# Patient Record
Sex: FEMALE | Race: WHITE | NOT HISPANIC OR LATINO | Employment: FULL TIME | URBAN - METROPOLITAN AREA
[De-identification: names, ages, dates, MRNs, and addresses within clinical notes are randomized per-mention and may not be internally consistent; named-entity substitution may affect disease eponyms.]

---

## 2018-09-11 ENCOUNTER — HOSPITAL ENCOUNTER (EMERGENCY)
Facility: HOSPITAL | Age: 38
Discharge: HOME | End: 2018-09-11
Attending: EMERGENCY MEDICINE
Payer: COMMERCIAL

## 2018-09-11 ENCOUNTER — APPOINTMENT (EMERGENCY)
Dept: CT IMAGING | Facility: HOSPITAL | Age: 38
End: 2018-09-11
Payer: COMMERCIAL

## 2018-09-11 VITALS
BODY MASS INDEX: 29.35 KG/M2 | SYSTOLIC BLOOD PRESSURE: 134 MMHG | OXYGEN SATURATION: 100 % | WEIGHT: 176.37 LBS | DIASTOLIC BLOOD PRESSURE: 83 MMHG | RESPIRATION RATE: 18 BRPM | TEMPERATURE: 98.1 F | HEART RATE: 83 BPM

## 2018-09-11 DIAGNOSIS — R51.9 HEADACHE: Primary | ICD-10-CM

## 2018-09-11 LAB
ALBUMIN SERPL BCP-MCNC: 4 G/DL (ref 3.5–5)
ALP SERPL-CCNC: 82 U/L (ref 46–116)
ALT SERPL W P-5'-P-CCNC: 34 U/L (ref 12–78)
ANION GAP SERPL CALCULATED.3IONS-SCNC: 12 MMOL/L (ref 4–13)
AST SERPL W P-5'-P-CCNC: 15 U/L (ref 5–45)
BASOPHILS # BLD AUTO: 0.05 THOUSANDS/ΜL (ref 0–0.1)
BASOPHILS NFR BLD AUTO: 1 % (ref 0–1)
BILIRUB SERPL-MCNC: 0.8 MG/DL (ref 0.2–1)
BUN SERPL-MCNC: 7 MG/DL (ref 5–25)
CALCIUM SERPL-MCNC: 8.7 MG/DL (ref 8.3–10.1)
CHLORIDE SERPL-SCNC: 104 MMOL/L (ref 100–108)
CO2 SERPL-SCNC: 27 MMOL/L (ref 21–32)
CREAT SERPL-MCNC: 0.79 MG/DL (ref 0.6–1.3)
EOSINOPHIL # BLD AUTO: 0.21 THOUSAND/ΜL (ref 0–0.61)
EOSINOPHIL NFR BLD AUTO: 3 % (ref 0–6)
ERYTHROCYTE [DISTWIDTH] IN BLOOD BY AUTOMATED COUNT: 11.5 % (ref 11.6–15.1)
GFR SERPL CREATININE-BSD FRML MDRD: 95 ML/MIN/1.73SQ M
GLUCOSE SERPL-MCNC: 90 MG/DL (ref 65–140)
HCT VFR BLD AUTO: 41 % (ref 34.8–46.1)
HGB BLD-MCNC: 14.2 G/DL (ref 11.5–15.4)
IMM GRANULOCYTES # BLD AUTO: 0.01 THOUSAND/UL (ref 0–0.2)
IMM GRANULOCYTES NFR BLD AUTO: 0 % (ref 0–2)
LYMPHOCYTES # BLD AUTO: 2.26 THOUSANDS/ΜL (ref 0.6–4.47)
LYMPHOCYTES NFR BLD AUTO: 31 % (ref 14–44)
MCH RBC QN AUTO: 32.3 PG (ref 26.8–34.3)
MCHC RBC AUTO-ENTMCNC: 34.6 G/DL (ref 31.4–37.4)
MCV RBC AUTO: 93 FL (ref 82–98)
MONOCYTES # BLD AUTO: 0.54 THOUSAND/ΜL (ref 0.17–1.22)
MONOCYTES NFR BLD AUTO: 7 % (ref 4–12)
NEUTROPHILS # BLD AUTO: 4.19 THOUSANDS/ΜL (ref 1.85–7.62)
NEUTS SEG NFR BLD AUTO: 58 % (ref 43–75)
NRBC BLD AUTO-RTO: 0 /100 WBCS
PLATELET # BLD AUTO: 318 THOUSANDS/UL (ref 149–390)
PMV BLD AUTO: 9.4 FL (ref 8.9–12.7)
POTASSIUM SERPL-SCNC: 3.6 MMOL/L (ref 3.5–5.3)
PROT SERPL-MCNC: 7.7 G/DL (ref 6.4–8.2)
RBC # BLD AUTO: 4.39 MILLION/UL (ref 3.81–5.12)
SODIUM SERPL-SCNC: 143 MMOL/L (ref 136–145)
WBC # BLD AUTO: 7.26 THOUSAND/UL (ref 4.31–10.16)

## 2018-09-11 PROCEDURE — 96365 THER/PROPH/DIAG IV INF INIT: CPT

## 2018-09-11 PROCEDURE — 85025 COMPLETE CBC W/AUTO DIFF WBC: CPT | Performed by: EMERGENCY MEDICINE

## 2018-09-11 PROCEDURE — 36415 COLL VENOUS BLD VENIPUNCTURE: CPT | Performed by: EMERGENCY MEDICINE

## 2018-09-11 PROCEDURE — 99284 EMERGENCY DEPT VISIT MOD MDM: CPT

## 2018-09-11 PROCEDURE — 96361 HYDRATE IV INFUSION ADD-ON: CPT

## 2018-09-11 PROCEDURE — 70450 CT HEAD/BRAIN W/O DYE: CPT

## 2018-09-11 PROCEDURE — 96375 TX/PRO/DX INJ NEW DRUG ADDON: CPT

## 2018-09-11 PROCEDURE — 80053 COMPREHEN METABOLIC PANEL: CPT | Performed by: EMERGENCY MEDICINE

## 2018-09-11 RX ORDER — BUTALBITAL, ACETAMINOPHEN AND CAFFEINE 50; 325; 40 MG/1; MG/1; MG/1
1 TABLET ORAL EVERY 4 HOURS PRN
Qty: 20 TABLET | Refills: 0 | Status: SHIPPED | OUTPATIENT
Start: 2018-09-11

## 2018-09-11 RX ORDER — METOCLOPRAMIDE 10 MG/1
10 TABLET ORAL EVERY 6 HOURS PRN
Qty: 30 TABLET | Refills: 0 | Status: SHIPPED | OUTPATIENT
Start: 2018-09-11 | End: 2020-05-18

## 2018-09-11 RX ORDER — METOCLOPRAMIDE HYDROCHLORIDE 5 MG/ML
10 INJECTION INTRAMUSCULAR; INTRAVENOUS ONCE
Status: COMPLETED | OUTPATIENT
Start: 2018-09-11 | End: 2018-09-11

## 2018-09-11 RX ORDER — LORAZEPAM 2 MG/ML
0.5 INJECTION INTRAMUSCULAR ONCE
Status: COMPLETED | OUTPATIENT
Start: 2018-09-11 | End: 2018-09-11

## 2018-09-11 RX ORDER — KETOROLAC TROMETHAMINE 30 MG/ML
30 INJECTION, SOLUTION INTRAMUSCULAR; INTRAVENOUS ONCE
Status: COMPLETED | OUTPATIENT
Start: 2018-09-11 | End: 2018-09-11

## 2018-09-11 RX ORDER — MAGNESIUM SULFATE HEPTAHYDRATE 40 MG/ML
2 INJECTION, SOLUTION INTRAVENOUS ONCE
Status: COMPLETED | OUTPATIENT
Start: 2018-09-11 | End: 2018-09-11

## 2018-09-11 RX ORDER — DIPHENHYDRAMINE HYDROCHLORIDE 50 MG/ML
25 INJECTION INTRAMUSCULAR; INTRAVENOUS ONCE
Status: COMPLETED | OUTPATIENT
Start: 2018-09-11 | End: 2018-09-11

## 2018-09-11 RX ADMIN — KETOROLAC TROMETHAMINE 30 MG: 30 INJECTION, SOLUTION INTRAMUSCULAR at 18:36

## 2018-09-11 RX ADMIN — DIPHENHYDRAMINE HYDROCHLORIDE 25 MG: 50 INJECTION, SOLUTION INTRAMUSCULAR; INTRAVENOUS at 18:36

## 2018-09-11 RX ADMIN — HYDROMORPHONE HYDROCHLORIDE 0.5 MG: 1 INJECTION, SOLUTION INTRAMUSCULAR; INTRAVENOUS; SUBCUTANEOUS at 19:45

## 2018-09-11 RX ADMIN — LORAZEPAM 0.5 MG: 2 INJECTION INTRAMUSCULAR; INTRAVENOUS at 19:45

## 2018-09-11 RX ADMIN — MAGNESIUM SULFATE HEPTAHYDRATE 2 G: 40 INJECTION, SOLUTION INTRAVENOUS at 18:37

## 2018-09-11 RX ADMIN — METOCLOPRAMIDE 10 MG: 5 INJECTION, SOLUTION INTRAMUSCULAR; INTRAVENOUS at 18:36

## 2018-09-11 RX ADMIN — SODIUM CHLORIDE 1000 ML: 0.9 INJECTION, SOLUTION INTRAVENOUS at 18:37

## 2018-09-11 NOTE — ED PROVIDER NOTES
History  Chief Complaint   Patient presents with   [address]     Patient reports having "bad headache for 6 weeks " Woke up today with blurry vision out of left eye and states, "it feels like my head is going to explode "  Has appt with neurology 528      66-year-old female comes in for evaluation headache  Patient reports having headache starting approximately 6 weeks ago saw her PCP who put her on anti-inflammatories and muscle relaxants  It is patient states that that did not help and she has been trying to use Excedrin migraine to manage the headache  Patient states the pain became much worse today and now has some double vision in her left eye  Patient denies any previous history of migraines  Patient denies any fever or neck pain  History provided by:  Patient   used: No    Headache   Pain location:  L temporal  Quality:  Stabbing  Radiates to:  Eyes  Severity currently:  10/10  Severity at highest:  10/10  Onset quality:  Gradual  Duration:  6 weeks  Timing:  Constant  Progression:  Worsening  Chronicity:  New  Similar to prior headaches: no    Context: bright light    Ineffective treatments:  Acetaminophen, prescription medications and resting in a darkened room  Associated symptoms: blurred vision and visual change    Associated symptoms: no abdominal pain, no back pain, no congestion, no cough, no diarrhea, no ear pain, no fatigue, no fever, no neck stiffness and no numbness    Visual Change:     Location:  L eye    Quality: decreased vision and double vision      Onset quality:  Sudden    Severity:  Moderate    Duration:  8 hours    Timing:  Constant    Progression:  Worsening    Chronicity:  New  Risk factors: no anger and lifestyle not sedentary        Prior to Admission Medications   Prescriptions Last Dose Informant Patient Reported?  Taking?   oxyCODONE-acetaminophen (PERCOCET) 5-325 mg per tablet   No No   Sig: Take 1 tablet by mouth every 4 (four) hours as needed for moderate pain for up to 15 doses  Max Daily Amount: 6 tablets      Facility-Administered Medications: None       Past Medical History:   Diagnosis Date    Migraine     Ovarian cyst     left       Past Surgical History:   Procedure Laterality Date    HYSTERECTOMY      KNEE SURGERY         History reviewed  No pertinent family history  I have reviewed and agree with the history as documented  Social History   Substance Use Topics    Smoking status: Current Every Day Smoker     Packs/day: 1 00    Smokeless tobacco: Not on file    Alcohol use Yes      Comment: social        Review of Systems   Constitutional: Negative for fatigue and fever  HENT: Negative for congestion and ear pain  Eyes: Positive for blurred vision  Negative for discharge and redness  Respiratory: Negative for apnea, cough, shortness of breath and wheezing  Cardiovascular: Negative for chest pain  Gastrointestinal: Negative for abdominal pain and diarrhea  Endocrine: Negative for cold intolerance and polydipsia  Genitourinary: Negative for difficulty urinating and hematuria  Musculoskeletal: Negative for arthralgias, back pain and neck stiffness  Skin: Negative for color change and rash  Allergic/Immunologic: Negative for environmental allergies and immunocompromised state  Neurological: Positive for headaches  Negative for numbness  Hematological: Negative for adenopathy  Does not bruise/bleed easily  Psychiatric/Behavioral: Negative for agitation and behavioral problems  Physical Exam  Physical Exam   Constitutional: She is oriented to person, place, and time  Vital signs are normal  She appears well-developed and well-nourished  Non-toxic appearance  She appears distressed  HENT:   Head: Normocephalic and atraumatic     Right Ear: Tympanic membrane and external ear normal    Left Ear: Tympanic membrane and external ear normal    Nose: Nose normal  No rhinorrhea, sinus tenderness or nasal deformity  Mouth/Throat: Uvula is midline and oropharynx is clear and moist  Normal dentition  Eyes: Conjunctivae, EOM and lids are normal  Pupils are equal, round, and reactive to light  Right eye exhibits no discharge  Left eye exhibits no discharge  Neck: Trachea normal and normal range of motion  Neck supple  No JVD present  Carotid bruit is not present  Cardiovascular: Normal rate, regular rhythm, intact distal pulses and normal pulses  No extrasystoles are present  PMI is not displaced  Pulmonary/Chest: Effort normal and breath sounds normal  No accessory muscle usage  No respiratory distress  She has no wheezes  She has no rhonchi  She has no rales  Abdominal: Soft  Normal appearance and bowel sounds are normal  She exhibits no mass  There is no tenderness  There is no rigidity, no rebound and no guarding  Musculoskeletal:        Right shoulder: She exhibits normal range of motion, no bony tenderness, no swelling and no deformity  Cervical back: Normal  She exhibits normal range of motion, no tenderness, no bony tenderness and no deformity  Lymphadenopathy:     She has no cervical adenopathy  She has no axillary adenopathy  Neurological: She is alert and oriented to person, place, and time  She has normal strength and normal reflexes  No cranial nerve deficit or sensory deficit  GCS eye subscore is 4  GCS verbal subscore is 5  GCS motor subscore is 6  Skin: Skin is warm and dry  No rash noted  Psychiatric: She has a normal mood and affect  Her speech is normal and behavior is normal    Nursing note and vitals reviewed        Vital Signs  ED Triage Vitals   Temperature Pulse Respirations Blood Pressure SpO2   09/11/18 1806 09/11/18 1804 09/11/18 1804 09/11/18 1804 09/11/18 1804   98 1 °F (36 7 °C) 83 18 134/83 100 %      Temp Source Heart Rate Source Patient Position - Orthostatic VS BP Location FiO2 (%)   09/11/18 1806 09/11/18 1804 09/11/18 1804 09/11/18 1804 --   Oral Monitor Lying Right arm       Pain Score       09/11/18 1804       Worst Possible Pain           Vitals:    09/11/18 1804   BP: 134/83   Pulse: 83   Patient Position - Orthostatic VS: Lying       Visual Acuity      ED Medications  Medications   sodium chloride 0 9 % bolus 1,000 mL (1,000 mL Intravenous New Bag 9/11/18 1837)   diphenhydrAMINE (BENADRYL) injection 25 mg (25 mg Intravenous Given 9/11/18 1836)   metoclopramide (REGLAN) injection 10 mg (10 mg Intravenous Given 9/11/18 1836)   ketorolac (TORADOL) injection 30 mg (30 mg Intravenous Given 9/11/18 1836)   magnesium sulfate 2 g/50 mL IVPB (premix) 2 g (0 g Intravenous Stopped 9/11/18 1937)   HYDROmorphone (DILAUDID) injection 0 5 mg (0 5 mg Intravenous Given 9/11/18 1945)   LORazepam (ATIVAN) 2 mg/mL injection 0 5 mg (0 5 mg Intravenous Given 9/11/18 1945)       Diagnostic Studies  Results Reviewed     Procedure Component Value Units Date/Time    Comprehensive metabolic panel [[id number]] Collected:  09/11/18 1837    Lab Status:  Final result Specimen:  Blood from Arm, Right Updated:  09/11/18 1920     Sodium 143 mmol/L      Potassium 3 6 mmol/L      Chloride 104 mmol/L      CO2 27 mmol/L      ANION GAP 12 mmol/L      BUN 7 mg/dL      Creatinine 0 79 mg/dL      Glucose 90 mg/dL      Calcium 8 7 mg/dL      AST 15 U/L      ALT 34 U/L      Alkaline Phosphatase 82 U/L      Total Protein 7 7 g/dL      Albumin 4 0 g/dL      Total Bilirubin 0 80 mg/dL      eGFR 95 ml/min/1 73sq m     Narrative:         National Kidney Disease Education Program recommendations are as follows:  GFR calculation is accurate only with a steady state creatinine  Chronic Kidney disease less than 60 ml/min/1 73 sq  meters  Kidney failure less than 15 ml/min/1 73 sq  meters      CBC and differential [[id number]]  (Abnormal) Collected:  09/11/18 1837    Lab Status:  Final result Specimen:  Blood from Arm, Right Updated:  09/11/18 1846     WBC 7 26 Thousand/uL      RBC 4 39 Million/uL Hemoglobin 14 2 g/dL      Hematocrit 41 0 %      MCV 93 fL      MCH 32 3 pg      MCHC 34 6 g/dL      RDW 11 5 (L) %      MPV 9 4 fL      Platelets 682 Thousands/uL      nRBC 0 /100 WBCs      Neutrophils Relative 58 %      Immat GRANS % 0 %      Lymphocytes Relative 31 %      Monocytes Relative 7 %      Eosinophils Relative 3 %      Basophils Relative 1 %      Neutrophils Absolute 4 19 Thousands/µL      Immature Grans Absolute 0 01 Thousand/uL      Lymphocytes Absolute 2 26 Thousands/µL      Monocytes Absolute 0 54 Thousand/µL      Eosinophils Absolute 0 21 Thousand/µL      Basophils Absolute 0 05 Thousands/µL                  CT head wo contrast   Final Result by Naman Bowman MD (09/11 1927)      No acute intracranial abnormality  Workstation performed: [id number]                    Procedures  Procedures       Phone Contacts  ED Phone Contact    ED Course                               MDM  Number of Diagnoses or Management Options  Headache: new and requires workup     Amount and/or Complexity of Data Reviewed  Clinical lab tests: ordered and reviewed  Tests in the radiology section of CPT®: ordered and reviewed  Tests in the medicine section of CPT®: ordered and reviewed    Patient Progress  Patient progress: stable    CritCare Time    Disposition  Final diagnoses:   Headache     Time reflects when diagnosis was documented in both MDM as applicable and the Disposition within this note     Time User Action Codes Description Comment    9/11/2018  7:59 PM Theodora MALHOTRA Add [R51] Headache       ED Disposition     ED Disposition Condition Comment    Discharge  [facility] 60 discharge to home/self care      Condition at discharge: Good        Follow-up Information     Follow up With Specialties Details Why 12 Silver Cochran MD Internal Medicine Schedule an appointment as soon as possible for a visit  [address] [place] [address]  [phone]            Patient's Medications   Discharge Prescriptions    BUTALBITAL-ACETAMINOPHEN-CAFFEINE (FIORICET,ESGIC) -40 MG PER TABLET    Take 1 tablet by mouth every 4 (four) hours as needed for headaches       Start Date: 9/11/2018 End Date: --       Order Dose: 1 tablet       Quantity: 20 tablet    Refills: 0    METOCLOPRAMIDE (REGLAN) 10 MG TABLET    Take 1 tablet (10 mg total) by mouth every 6 (six) hours as needed (Nausea vomiting)       Start Date: 9/11/2018 End Date: --       Order Dose: 10 mg       Quantity: 30 tablet    Refills: 0     No discharge procedures on file      ED Provider  Electronically Signed by           Carline Ashley DO  09/11/18 2003

## 2018-09-12 NOTE — ED NOTES
Discharge instruction given to patient  Medication and prescription reviewed  No questions or concerns at this time  Patient able to ambulate out without difficulty        Zoltan Soriano RN  09/11/18 2050

## 2018-09-12 NOTE — DISCHARGE INSTRUCTIONS
Acute Headache, Ambulatory Care   GENERAL INFORMATION:   An acute headache  is pain or discomfort that starts suddenly and gets worse quickly  The cause of an acute headache may not be known  It may be triggered by stress, fatigue, hormones, food, or trauma  Common related symptoms include the following:   · Fever    · Sinus pressure    · Loss of memory    · Nausea or vomiting    · Problems with your vision, such as watery or red eyes, loss of vision, or pain in bright light    · Stiff neck    · Tenderness of the head and neck area    · Trouble staying awake, or being less alert than usual     · Weakness or less energy  Seek immediate care for the following symptoms:   · Severe pain    · A headache that occurs after a blow to the head, a fall, or other trauma     · Confusion or forgetfulness    · Numbness on one side of your face or body  Treatment for an acute headache  may include medicine to decrease pain  You may also need biofeedback or cognitive behavioral therapy  Ask your healthcare provider about these and other treatments for an acute headache  Manage my symptoms:   · Apply heat  on your head for 20 to 30 minutes every 2 hours for as many days as directed  Heat helps decrease pain and muscle spasms  You may alternate heat and ice  · Apply ice  on your head for 15 to 20 minutes every hour or as directed  Use an ice pack, or put crushed ice in a plastic bag  Cover it with a towel  Ice helps decrease pain  · Relax your muscles  Lie down in a comfortable position and close your eyes  Relax your muscles slowly  Start at your toes and work your way up your body  · Keep a record of your headaches  Write down when your headaches start and stop  Include your symptoms and what you were doing when the headache began  Record what you ate or drank for 24 hours before the headache started  Describe the pain and where it hurts  Keep track of what you did to treat your headache and whether it worked    Follow up with your healthcare provider as directed:  Bring your headache record with you when you see your healthcare provider  Write down your questions so you remember to ask them during your visits  CARE AGREEMENT:   You have the right to help plan your care  Learn about your health condition and how it may be treated  Discuss treatment options with your caregivers to decide what care you want to receive  You always have the right to refuse treatment  The above information is an  only  It is not intended as medical advice for individual conditions or treatments  Talk to your doctor, nurse or pharmacist before following any medical regimen to see if it is safe and effective for you  © 2014 [address] is for End User's use only and may not be sold, redistributed or otherwise used for commercial purposes  All illustrations and images included in CareNotes® are the copyrighted property of A D A M , Inc  or Drake Souza

## 2018-09-14 ENCOUNTER — HOSPITAL ENCOUNTER (INPATIENT)
Facility: HOSPITAL | Age: 38
LOS: 2 days | Discharge: HOME | DRG: 103 | End: 2018-09-19
Attending: EMERGENCY MEDICINE | Admitting: INTERNAL MEDICINE
Payer: COMMERCIAL

## 2018-09-14 DIAGNOSIS — R51.9 INTRACTABLE HEADACHE, UNSPECIFIED CHRONICITY PATTERN, UNSPECIFIED HEADACHE TYPE: Primary | ICD-10-CM

## 2018-09-14 PROCEDURE — 96375 TX/PRO/DX INJ NEW DRUG ADDON: CPT

## 2018-09-14 PROCEDURE — 99219 PR INITIAL OBSERVATION CARE/DAY 50 MINUTES: CPT | Performed by: PHYSICIAN ASSISTANT

## 2018-09-14 PROCEDURE — 99284 EMERGENCY DEPT VISIT MOD MDM: CPT

## 2018-09-14 PROCEDURE — 96372 THER/PROPH/DIAG INJ SC/IM: CPT

## 2018-09-14 PROCEDURE — 96365 THER/PROPH/DIAG IV INF INIT: CPT

## 2018-09-14 RX ORDER — ALPRAZOLAM 0.25 MG/1
1 TABLET ORAL DAILY PRN
Status: ON HOLD | COMMUNITY
End: 2018-09-19

## 2018-09-14 RX ORDER — NICOTINE 21 MG/24HR
1 PATCH, TRANSDERMAL 24 HOURS TRANSDERMAL DAILY
Status: DISCONTINUED | OUTPATIENT
Start: 2018-09-15 | End: 2018-09-19 | Stop reason: HOSPADM

## 2018-09-14 RX ORDER — DICYCLOMINE HYDROCHLORIDE 10 MG/1
20 CAPSULE ORAL
Status: DISCONTINUED | OUTPATIENT
Start: 2018-09-14 | End: 2018-09-19 | Stop reason: HOSPADM

## 2018-09-14 RX ORDER — ALPRAZOLAM 0.25 MG/1
0.25 TABLET ORAL DAILY PRN
Status: DISCONTINUED | OUTPATIENT
Start: 2018-09-14 | End: 2018-09-19 | Stop reason: HOSPADM

## 2018-09-14 RX ORDER — MAGNESIUM SULFATE HEPTAHYDRATE 40 MG/ML
2 INJECTION, SOLUTION INTRAVENOUS ONCE
Status: COMPLETED | OUTPATIENT
Start: 2018-09-14 | End: 2018-09-14

## 2018-09-14 RX ORDER — HALOPERIDOL 5 MG/ML
5 INJECTION INTRAMUSCULAR ONCE
Status: COMPLETED | OUTPATIENT
Start: 2018-09-14 | End: 2018-09-14

## 2018-09-14 RX ORDER — DIPHENHYDRAMINE HYDROCHLORIDE 50 MG/ML
25 INJECTION INTRAMUSCULAR; INTRAVENOUS ONCE
Status: COMPLETED | OUTPATIENT
Start: 2018-09-14 | End: 2018-09-14

## 2018-09-14 RX ORDER — KETOROLAC TROMETHAMINE 30 MG/ML
10 INJECTION, SOLUTION INTRAMUSCULAR; INTRAVENOUS ONCE
Status: COMPLETED | OUTPATIENT
Start: 2018-09-14 | End: 2018-09-14

## 2018-09-14 RX ORDER — METOCLOPRAMIDE HYDROCHLORIDE 5 MG/ML
5 INJECTION INTRAMUSCULAR; INTRAVENOUS ONCE
Status: COMPLETED | OUTPATIENT
Start: 2018-09-14 | End: 2018-09-14

## 2018-09-14 RX ORDER — DIPHENHYDRAMINE HYDROCHLORIDE 50 MG/ML
25 INJECTION INTRAMUSCULAR; INTRAVENOUS EVERY 8 HOURS PRN
Status: DISPENSED | OUTPATIENT
Start: 2018-09-14 | End: 2018-09-17

## 2018-09-14 RX ORDER — CYCLOBENZAPRINE HCL 10 MG
10 TABLET ORAL EVERY MORNING
Status: DISCONTINUED | OUTPATIENT
Start: 2018-09-15 | End: 2018-09-15

## 2018-09-14 RX ORDER — KETOROLAC TROMETHAMINE 30 MG/ML
30 INJECTION, SOLUTION INTRAMUSCULAR; INTRAVENOUS EVERY 12 HOURS
Status: COMPLETED | OUTPATIENT
Start: 2018-09-15 | End: 2018-09-17

## 2018-09-14 RX ORDER — DICYCLOMINE HCL 20 MG
1 TABLET ORAL
COMMUNITY
End: 2020-06-02 | Stop reason: SDUPTHER

## 2018-09-14 RX ORDER — MAGNESIUM SULFATE HEPTAHYDRATE 40 MG/ML
2 INJECTION, SOLUTION INTRAVENOUS
Status: DISCONTINUED | OUTPATIENT
Start: 2018-09-15 | End: 2018-09-15

## 2018-09-14 RX ORDER — METOCLOPRAMIDE HYDROCHLORIDE 5 MG/ML
10 INJECTION INTRAMUSCULAR; INTRAVENOUS EVERY 8 HOURS SCHEDULED
Status: DISCONTINUED | OUTPATIENT
Start: 2018-09-14 | End: 2018-09-16

## 2018-09-14 RX ORDER — BUPIVACAINE HYDROCHLORIDE 5 MG/ML
30 INJECTION, SOLUTION EPIDURAL; INTRACAUDAL ONCE
Status: COMPLETED | OUTPATIENT
Start: 2018-09-14 | End: 2018-09-14

## 2018-09-14 RX ORDER — MORPHINE SULFATE 10 MG/ML
6 INJECTION, SOLUTION INTRAMUSCULAR; INTRAVENOUS ONCE
Status: DISCONTINUED | OUTPATIENT
Start: 2018-09-14 | End: 2018-09-14

## 2018-09-14 RX ADMIN — HALOPERIDOL LACTATE 5 MG: 5 INJECTION, SOLUTION INTRAMUSCULAR at 18:46

## 2018-09-14 RX ADMIN — BUPIVACAINE HYDROCHLORIDE 30 ML: 5 INJECTION, SOLUTION EPIDURAL; INTRACAUDAL at 20:42

## 2018-09-14 RX ADMIN — MAGNESIUM SULFATE HEPTAHYDRATE 2 G: 40 INJECTION, SOLUTION INTRAVENOUS at 19:52

## 2018-09-14 RX ADMIN — KETOROLAC TROMETHAMINE 9.9 MG: 30 INJECTION, SOLUTION INTRAMUSCULAR at 19:51

## 2018-09-14 RX ADMIN — SODIUM CHLORIDE 500 ML: 0.9 INJECTION, SOLUTION INTRAVENOUS at 19:52

## 2018-09-14 RX ADMIN — DIPHENHYDRAMINE HYDROCHLORIDE 25 MG: 50 INJECTION, SOLUTION INTRAMUSCULAR; INTRAVENOUS at 18:47

## 2018-09-14 RX ADMIN — METOCLOPRAMIDE 5 MG: 5 INJECTION, SOLUTION INTRAMUSCULAR; INTRAVENOUS at 19:51

## 2018-09-14 NOTE — LETTER
[facility]  FLOOR MED SURG UNIT  [address] [facility] [address]  Dept: [phone]    September 19, 2018     Patient: Antonio Blood   YOB: 1980   Date of Visit: 9/14/2018       To Whom it May Concern:    Chavo Love is under my professional care  She was seen in the hospital from 9/14/2018   to 09/19/18  She may return to work on 9/24/18  If you have any questions or concerns, please don't hesitate to call           Sincerely,          Elaine Green PA-C

## 2018-09-15 PROCEDURE — 99225 PR SBSQ OBSERVATION CARE/DAY 25 MINUTES: CPT | Performed by: INTERNAL MEDICINE

## 2018-09-15 PROCEDURE — 99245 OFF/OP CONSLTJ NEW/EST HI 55: CPT | Performed by: PSYCHIATRY & NEUROLOGY

## 2018-09-15 RX ORDER — BACLOFEN 10 MG/1
10 TABLET ORAL 3 TIMES DAILY
Status: DISCONTINUED | OUTPATIENT
Start: 2018-09-15 | End: 2018-09-19 | Stop reason: HOSPADM

## 2018-09-15 RX ORDER — MAGNESIUM SULFATE 1 G/100ML
1 INJECTION INTRAVENOUS 2 TIMES DAILY
Status: DISCONTINUED | OUTPATIENT
Start: 2018-09-15 | End: 2018-09-16

## 2018-09-15 RX ORDER — SODIUM CHLORIDE 9 MG/ML
125 INJECTION, SOLUTION INTRAVENOUS CONTINUOUS
Status: DISPENSED | OUTPATIENT
Start: 2018-09-15 | End: 2018-09-15

## 2018-09-15 RX ORDER — DIAZEPAM 5 MG/ML
5 INJECTION, SOLUTION INTRAMUSCULAR; INTRAVENOUS EVERY 6 HOURS
Status: DISCONTINUED | OUTPATIENT
Start: 2018-09-15 | End: 2018-09-16

## 2018-09-15 RX ADMIN — Medication 5 MG: at 17:45

## 2018-09-15 RX ADMIN — DICYCLOMINE HYDROCHLORIDE 20 MG: 10 CAPSULE ORAL at 21:47

## 2018-09-15 RX ADMIN — DICYCLOMINE HYDROCHLORIDE 20 MG: 10 CAPSULE ORAL at 00:31

## 2018-09-15 RX ADMIN — METOCLOPRAMIDE 10 MG: 5 INJECTION, SOLUTION INTRAMUSCULAR; INTRAVENOUS at 21:47

## 2018-09-15 RX ADMIN — BACLOFEN 10 MG: 10 TABLET ORAL at 16:48

## 2018-09-15 RX ADMIN — METOCLOPRAMIDE 10 MG: 5 INJECTION, SOLUTION INTRAMUSCULAR; INTRAVENOUS at 13:11

## 2018-09-15 RX ADMIN — MAGNESIUM SULFATE HEPTAHYDRATE 1 G: 1 INJECTION, SOLUTION INTRAVENOUS at 13:13

## 2018-09-15 RX ADMIN — KETOROLAC TROMETHAMINE 30 MG: 30 INJECTION, SOLUTION INTRAMUSCULAR at 01:44

## 2018-09-15 RX ADMIN — MAGNESIUM SULFATE HEPTAHYDRATE 1 G: 1 INJECTION, SOLUTION INTRAVENOUS at 21:47

## 2018-09-15 RX ADMIN — SODIUM CHLORIDE 125 ML/HR: 0.9 INJECTION, SOLUTION INTRAVENOUS at 02:31

## 2018-09-15 RX ADMIN — CYCLOBENZAPRINE HYDROCHLORIDE 10 MG: 10 TABLET, FILM COATED ORAL at 08:33

## 2018-09-15 RX ADMIN — METOCLOPRAMIDE 10 MG: 5 INJECTION, SOLUTION INTRAMUSCULAR; INTRAVENOUS at 05:31

## 2018-09-15 RX ADMIN — KETOROLAC TROMETHAMINE 30 MG: 30 INJECTION, SOLUTION INTRAMUSCULAR at 13:10

## 2018-09-15 RX ADMIN — METOCLOPRAMIDE 10 MG: 5 INJECTION, SOLUTION INTRAMUSCULAR; INTRAVENOUS at 00:31

## 2018-09-15 RX ADMIN — BACLOFEN 10 MG: 10 TABLET ORAL at 21:47

## 2018-09-15 NOTE — CASE MANAGEMENT
Thank you,  [address]  Utilization Review Department  Phone: [phone]; Fax [phone]  ATTENTION: Please call with any questions or concerns to [phone]  and carefully follow the prompts so that you are directed to the right person  Send all requests for admission clinical reviews, approved or denied determinations and any other requests to fax [phone]  All voicemails are confidential    Initial Clinical Review    Admission: Date/Time/Statement: OBSERVATION ADMISSION  09/14/18 2141 CONVERTED TO INPATIENT ADMISSION 09/17/2018 0900 09/17/18 0900  Inpatient Admission Once     Transfer Service: General Medicine       Question Answer Comment   Admitting Physician Wu Levy    Level of Care Med Surg    Estimated length of stay More than 2 Midnights    Certification I certify that inpatient services are medically necessary for this patient for a duration of greater than two midnights  See H&P and MD Progress Notes for additional information about the patient's course of treatment  09/17/18 0900             ED: Date/Time/Mode of Arrival:   ED Arrival Information     Expected Arrival Acuity Means of Arrival Escorted By Service Admission Type    - 9/14/2018 18:03 Urgent Walk-In Self Hospitalist Urgent    Arrival Complaint    headache          Chief Complaint:   Chief Complaint   Patient presents with    Headache     Pt  c/o headache for seven weeks with nausea  Pt  seen here on Tuesday for same and tx  alleviated some pain  Pt  reports pain is worse now with b/l ear pain  History of Illness: 45 y o  Female with 7 weeks of intermittent daily headaches  The pattern begins as pain in base of neck then up in head with diffuse throbbing with bilateral diplopia  the pain was on the left side of her head and behind her left eye and also reports "seeing yellow" in that eye  She says the past two weeks her pain has significantly increased    She describes the pain as fluctuating in nature between throbbing and sharp  She says recently her pain migrated to her right eye now        ED Vital Signs:   ED Triage Vitals   Temperature Pulse Respirations Blood Pressure SpO2   09/14/18 1812 09/14/18 1809 09/14/18 1809 09/14/18 1809 09/14/18 1809   98 °F (36 7 °C) 71 20 120/71 98 %      Temp Source Heart Rate Source Patient Position - Orthostatic VS BP Location FiO2 (%)   09/14/18 1812 09/14/18 2034 09/14/18 2034 09/14/18 2034 --   Oral Monitor Lying Right arm       Pain Score       09/14/18 1809       Worst Possible Pain        Wt Readings from Last 1 Encounters:   09/14/18 77 kg (169 lb 12 1 oz)       Vital Signs (abnormal): 09/14 Temp 97 4, BP 80/50, 89/46    09/15: BP: 88/44, 93/46    Abnormal Labs/Diagnostic Test Results: none    ED Treatment:   Medication Administration from 09/14/2018 1803 to 09/14/2018 9386       Date/Time Order Dose Route Action Action by Comments     09/14/2018 1846 haloperidol lactate (HALDOL) injection 5 mg 5 mg Intramuscular Given Des Barnett RN      09/14/2018 1847 diphenhydrAMINE (BENADRYL) injection 25 mg 25 mg Intramuscular Given Des Barnett RN      09/14/2018 1951 ketorolac (TORADOL) injection 9 9 mg 9 9 mg Intravenous Given [facility] ESTUARDO Owens      09/14/2018 2054 magnesium sulfate 2 g/50 mL IVPB (premix) 2 g 0 g Intravenous Stopped [facility] ESTUARDO Owens      09/14/2018 1952 magnesium sulfate 2 g/50 mL IVPB (premix) 2 g 2 g Intravenous [address] Mayuri Owens RN      09/14/2018 1951 metoclopramide (REGLAN) injection 5 mg 5 mg Intravenous Given [facility] ESTUARDO Owens      09/14/2018 2054 sodium chloride 0 9 % bolus 500 mL 0 [facility]      09/14/2018 1952 sodium chloride 0 9 % bolus 500 mL 500 mL Intravenous [facility], ESTUARDO      09/14/2018 2042 bupivacaine (PF) (MARCAINE) 0 5 % injection 30 mL 30 mL Infiltration Given Mayuri Owens RN Given to Dr Viviana Serrano for administration     09/14/2018 7849 morphine (PF) 10 mg/mL injection 6 mg 6 mg Intravenous Not Given Aurora Bauer RN Order discontinuined  Dose wasted per protocol  Past Medical/Surgical History: Active Ambulatory Problems     Diagnosis Date Noted    No Active Ambulatory Problems     Resolved Ambulatory Problems     Diagnosis Date Noted    No Resolved Ambulatory Problems     Past Medical History:   Diagnosis Date    Migraine     Ovarian cyst        Admitting Diagnosis: Headache [R51]  Intractable headache, unspecified chronicity pattern, unspecified headache type [R51]    Age/Sex: 45 y o  female    Assessment/Plan:   Headache   Assessment & Plan     · Dc'ed  From ED 9/11 with prescription for Fioricet  ? CT head 9/11 unremarkable, basic labs unremarkable 9/11  ? Returns today with persisting headache, denies h/o migraine   ? Received migraine cocktail in ED with minimal relief   ? Scheduled for OP appointment with Neurology on 9/28  · Neuro exam unremarkable  · Will continue migraine cocktail and supportive care for HA  ? Suspect element of musculoskeletal spasm contributing, prn muscle relaxer and aqua K   · If no improvement, consider consult to Neurology/MRI brain         Admission Orders:  Scheduled Meds:   Current Facility-Administered Medications:  ALPRAZolam 0 25 mg Oral Daily PRN   cyclobenzaprine 10 mg Oral QAM   dicyclomine 20 mg Oral HS   diphenhydrAMINE 25 mg Intravenous Q8H PRN   ketorolac 30 mg Intravenous Q12H   magnesium sulfate 2 g Intravenous Q24H MARIA D   metoclopramide 10 mg Intravenous Q8H [address]   nicotine 1 patch Transdermal Daily     Continuous Infusions:    PRN Meds:   Inpatient to Neurology  Vitals routine  Regular diet  Aqua K    09/15 Neurology consult:  Assessment:  Cervicogenic HA with migrainous features  Not responding to current migraine cocktail  She denies any history of cervicalgia prior to the current 7 week period of severe neck pain/HA  Also remote migraine hx prior    No meningeal signs/symptoms      Plan:  D/C flexeril  Start baclofen 10 mg po tid  Valium 5 mg IV q6 hour alternating with mag sulfate 1 gm IV bid  outpt PT for cervical neck stretching exercises  Obtaining a posturepedic pillow in outpt setting

## 2018-09-15 NOTE — CONSULTS
Consultation - Neurology   Randy Hdez 45 y o  female MRN: [id number]  Unit/Bed#: -01 Encounter: [id number]      Physician Requesting Consult: Kari Ji MD  Inpatient consult to Neurology  Consult performed by: DEBBIE [address] [place] [address] ordered by: Susan Combs        Reason for Consult / Principal Problem: headache            Assessment:  Cervicogenic HA with migrainous features  Not responding to current migraine cocktail  She denies any history of cervicalgia prior to the current 7 week period of severe neck pain/HA  Also remote migraine hx prior  No meningeal signs/symptoms  Plan:  D/C flexeril  Start baclofen 10 mg po tid  Valium 5 mg IV q6 hour alternating with mag sulfate 1 gm IV bid  outpt PT for cervical neck stretching exercises  Obtaining a posturepedic pillow in outpt setting      HPI: Randy Hdez is a 45 y o  female who states that 7 weeks ago she woke up with intense neck pain and that slowly radiated thoughout her head and more focused in the forehead over the next few days  Initially the neck/head pain was on/of  She does mention she had light sensitivity and noise sensitivity nausea but no vomiting during this entire 7 week period  For past month this headache and neck pain has been constant  States that she has been seeing a chiropractor for adjustments once a month that she has had to go more recently but does not think that that has worsened any anything  So she does not really have any constant neck pain but she has had intermittently for which she sees the chiropractor  No recent illness  Since this past Tuesday the pain went to just behind the left eye instead of the entire forehead as it had been and she was seeing a yellow tinge from that left eye but now the headache is a 8/10 and is located predominantly in the sides of her head  Throbbing in that area however she states not painful to touch the sides of her head where her severe pain    She has severe neck pain in her neck which she says IS painful to touch, throughout the entire neck and upper trapezius regions both sides  She states that she works at [employer] and the given week she has been having 5 to 6/10 headache only 2 days of the week rest of the weeks 9/10 however yesterday became unbearable she has severe ringing in her ears and intense banging she states that she couldn't focus after coming home from work and then she knew she needed further assistance  Says she is not really a headache person and all she really gets rarely gets headaches and she gets some states Excedrin  Recently she came to the emergency room 911 this past Tuesday and she was given Fioricet such to get every 4 hours that has not helped  She says she has not been eating well because of this  States currently her eyes feel sore as well  She feels her HA is worse when laying down but likely from neck contact with pillow        ROS:  Per 12 point review, nausea, blurry vision, neck pain, pulsating HA sides of head, light sensitivity, ringing in ears, sore eyes, poor appetite  Historical Information   Past Medical History:   Diagnosis Date    Migraine     Ovarian cyst     left     Past Surgical History:   Procedure Laterality Date    HYSTERECTOMY      KNEE SURGERY       Social History   History   Smoking Status    Current Every Day Smoker    Packs/day: 1 00   Smokeless Tobacco    Not on file     History   Alcohol Use    Yes     Comment: social     History   Drug Use No       Family History: non-contributory      Meds/Allergies     No Known Allergies    all current active meds have been reviewed    Objective   Vitals:Blood pressure (!) 93/46, pulse 58, temperature 98 4 °F (36 9 °C), temperature source Oral, resp  rate 18, height 5' 4" (1 626 m), weight 77 kg (169 lb 12 1 oz), SpO2 95 %  ,Body mass index is 29 14 kg/m²          Physical Exam:   Physical Exam General appearance: alert, appears stated age and cooperative  Head: Normocephalic, without obvious abnormality, atraumatic  Lungs: clear to auscultation bilaterally  Heart: regular rate and rhythm  Musculoskeletal: full head rotation to both sides and head flexion with chin to neck  No meningeal signs/symptoms      Neurologic:  Cognitive:  Mental status: Alert, orientedX3, thought content appropriate  Insight/attention/concentration intact  No expressive/receptive aphasia  CN: CNII-XII normal  Fundoscopy wnl  Motor: normal tone and bulk  5 power ue/le bilat  Sensory: proprioception, vibration intact  Cerebellar: finger to nose, heel to shin no dysmetria or ataxia  DTR's: 2 ue/le  Plantars: downgoing bilat         Lab Results: I have personally reviewed pertinent reports        Imaging Studies: I have personally reviewed pertinent films in PACS    EKG, Pathology, and Other Studies: I have personally reviewed pertinent films in PACS

## 2018-09-15 NOTE — ASSESSMENT & PLAN NOTE
· Dc'ed  From ED 9/11 with prescription for Fioricet  · CT head 9/11 unremarkable, basic labs unremarkable 9/11  · Returns today with persisting headache, denies h/o migraine   · Received migraine cocktail in ED with minimal relief   · Scheduled for OP appointment with Neurology on 9/28  · Neuro exam unremarkable  · Will continue migraine cocktail and supportive care for HA  · Suspect element of musculoskeletal spasm contributing, prn muscle relaxer and aqua K   · If no improvement, consider consult to Neurology/MRI brain

## 2018-09-15 NOTE — ED NOTES
Dr Elizabeth Graves notified of continuing c/o headache with no change at this time despite medication administration as prescribed        Bill Chávez, ESTUARDO  09/14/18 9243

## 2018-09-15 NOTE — PROGRESS NOTES
Progress Note - [facility],[address] 1980, 45 y o  female MRN: [id number]    Unit/Bed#: -01 Encounter: [id number]    Primary Care Provider: Timo Akins MD   Date and time admitted to hospital: 2018  6:08 PM        * Headache   Assessment & Plan    · Dc'ed  From ED  with prescription for Fioricet  · CT head  unremarkable, basic labs unremarkable   · Returns today with persisting headache, denies h/o migraine   · Received migraine cocktail in ED with minimal relief   · Scheduled for OP appointment with Neurology on   · Neuro exam unremarkable  · Will continue migraine cocktail and supportive care for HA  · Suspect element of musculoskeletal spasm contributing, prn muscle relaxer and aqua K   · Will improvement in headache today  · Will ask for neurology evaluation  · History is consistent with migraine with ocular features          VTE Pharmacologic Prophylaxis:   Pharmacologic: Low risk ambulate  Mechanical VTE Prophylaxis in Place: No    Patient Centered Rounds: I have performed bedside rounds with nursing staff today  Discussions with Specialists or Other Care Team Provider:     Education and Discussions with Family / Patient:   updated on the phone    Time Spent for Care: 20 minutes  More than 50% of total time spent on counseling and coordination of care as described above  Current Length of Stay: 0 day(s)    Current Patient Status: Observation   Certification Statement: continue hospitalization pending neurology evaluation    Discharge Plan:  Awaiting neurology evaluation    Code Status: Level 1 - Full Code      Subjective:   Headache still present dropping back of neck up top that has been present for 6 weeks    Objective:     Vitals:   Temp (24hrs), Av 9 °F (36 6 °C), Min:97 4 °F (36 3 °C), Max:98 4 °F (36 9 °C)    HR:  [58-90] 58  Resp:  [16-20] 18  BP: ()/(44-78) 93/46  SpO2:  [95 %-99 %] 95 %  Body mass index is 29 14 kg/m²       Input and Output Summary (last 24 hours): Intake/Output Summary (Last 24 hours) at 09/15/18 0834  Last data filed at 09/14/18 2054   Gross per 24 hour   Intake              550 ml   Output                0 ml   Net              550 ml       Physical Exam:     Physical Exam   Constitutional: No distress  Eyes: No scleral icterus  Neck: Normal range of motion  Cardiovascular: Normal rate  Exam reveals no gallop and no friction rub  No murmur heard  Pulmonary/Chest: Effort normal  No respiratory distress  She has no wheezes  She has no rales  Abdominal: Soft  She exhibits no distension  There is no tenderness  There is no rebound and no guarding  Musculoskeletal: She exhibits no edema or tenderness  Neurological: She is alert  Skin: She is not diaphoretic  Additional Data:     Labs:      Results from last 7 days  Lab Units 09/11/18  1837   WBC Thousand/uL 7 26   HEMOGLOBIN g/dL 14 2   HEMATOCRIT % 41 0   PLATELETS Thousands/uL 318   NEUTROS PCT % 58   LYMPHS PCT % 31   MONOS PCT % 7   EOS PCT % 3       Results from last 7 days  Lab Units 09/11/18  1837   SODIUM mmol/L 143   POTASSIUM mmol/L 3 6   CHLORIDE mmol/L 104   CO2 mmol/L 27   BUN mg/dL 7   CREATININE mg/dL 0 79   CALCIUM mg/dL 8 7   ALK PHOS U/L 82   ALT U/L 34   AST U/L 15                     * I Have Reviewed All Lab Data Listed Above  * Additional Pertinent Lab Tests Reviewed:  All Labs Within Last 24 Hours Reviewed    Imaging:    Imaging Reports Reviewed Today Include:   Imaging Personally Reviewed by Myself Includes:      Recent Cultures (last 7 days):           Last 24 Hours Medication List:     Current Facility-Administered Medications:  ALPRAZolam 0 25 mg Oral Daily PRN Alben SHABBIR Kc   cyclobenzaprine 10 mg Oral QA Kylah Henderson PA-C   dicyclomine 20 mg Oral HS Kylah Miller PA-C   diphenhydrAMINE 25 mg Intravenous Q8H PRN Alben RifSHABBIR branch   ketorolac 30 mg Intravenous Q12H Alben DinahfSHABBIR   magnesium sulfate 2 g Intravenous Q24H [address] Kylah Miller PA-C   metoclopramide 10 mg Intravenous Q8H [address] Kylah Mendoza PA-C   nicotine 1 patch Transdermal Daily Wicho Zayas PA-C        Today, Patient Was Seen By: Adam Peres MD    ** Please Note: Dictation voice to text software may have been used in the creation of this document   **

## 2018-09-15 NOTE — ED PROVIDER NOTES
History  Chief Complaint   Patient presents with    Headache     Pt  c/o headache for seven weeks with nausea  Pt  seen here on Tuesday for same and tx  alleviated some pain  Pt  reports pain is worse now with b/l ear pain  45 yr female with 7 weeks of intermitent almost daily headaches-- no previous hx dx-- states originally started as pain at base of neck then up into head/  With s diffuse throbbing headache- with bilateral diplopia-- with n/v- photophobia-- no injury - no fevers-  No  Eye nose d/c- seen in er 9/11 with neg labs/ head ct- and given meds which improved symptoms- now same headache and symptoms are back and constant- no new sympotms- NO ABRUPT OMNSET OF RIPPING/TEARIGN NECK PAIN- NO VERTIGO-         History provided by:  Patient   used: No    Headache   Associated symptoms: nausea, photophobia and vomiting    Associated symptoms: no abdominal pain, no diarrhea, no dizziness, no eye pain, no fatigue, no fever, no numbness, no seizures and no weakness        Prior to Admission Medications   Prescriptions Last Dose Informant Patient Reported? Taking? ALPRAZolam (XANAX) 0 25 mg tablet   Yes No   Sig: Take 1 tablet by mouth daily as needed   Ergocalciferol (ERGOCAL PO)   Yes No   Sig: Take 5,000 Units by mouth daily   butalbital-acetaminophen-caffeine (FIORICET,ESGIC) -40 mg per tablet 9/14/2018 at 1200  No Yes   Sig: Take 1 tablet by mouth every 4 (four) hours as needed for headaches   dicyclomine (BENTYL) 20 mg tablet   Yes No   Sig: Take 1 tablet by mouth daily at bedtime   metoclopramide (REGLAN) 10 mg tablet 9/14/2018 at 1200  No Yes   Sig: Take 1 tablet (10 mg total) by mouth every 6 (six) hours as needed (Nausea vomiting)   oxyCODONE-acetaminophen (PERCOCET) 5-325 mg per tablet 9/13/2018 at 2100  No Yes   Sig: Take 1 tablet by mouth every 4 (four) hours as needed for moderate pain for up to 15 doses   Max Daily Amount: 6 tablets      Facility-Administered Medications: None       Past Medical History:   Diagnosis Date    Migraine     Ovarian cyst     left       Past Surgical History:   Procedure Laterality Date    HYSTERECTOMY      KNEE SURGERY         History reviewed  No pertinent family history  I have reviewed and agree with the history as documented  Social History   Substance Use Topics    Smoking status: Current Every Day Smoker     Packs/day: 1 00    Smokeless tobacco: Not on file    Alcohol use Yes      Comment: social        Review of Systems   Constitutional: Positive for appetite change  Negative for activity change, chills, diaphoresis, fatigue, fever and unexpected weight change  HENT: Negative  Eyes: Positive for photophobia and visual disturbance  Negative for pain, discharge, redness and itching  Respiratory: Negative  Cardiovascular: Negative  Gastrointestinal: Positive for nausea and vomiting  Negative for abdominal distention, abdominal pain, anal bleeding, blood in stool, constipation, diarrhea and rectal pain  Endocrine: Negative  Genitourinary: Negative  Musculoskeletal: Negative  Skin: Negative  Allergic/Immunologic: Negative  Neurological: Positive for headaches  Negative for dizziness, tremors, seizures, syncope, facial asymmetry, speech difficulty, weakness, light-headedness and numbness  Hematological: Negative  Psychiatric/Behavioral: Negative  Physical Exam  Physical Exam   Constitutional: She is oriented to person, place, and time  She appears well-developed and well-nourished  She appears distressed  avss-- pulse ox  99 % ON RA- INTERPRETATION IS NORMAL- NO INTERVENTION- SITTING DARK ROOM WITH SUN GLASSES ON    HENT:   Head: Normocephalic and atraumatic  Right Ear: External ear normal    Left Ear: External ear normal    Nose: Nose normal    Mouth/Throat: Oropharynx is clear and moist  No oropharyngeal exudate     Eyes: Conjunctivae and EOM are normal  Pupils are equal, round, and reactive to light  Right eye exhibits no discharge  Left eye exhibits no discharge  No scleral icterus  MM PINK   Neck: Normal range of motion  Neck supple  No JVD present  No tracheal deviation present  No thyromegaly present  NO MENINGEAL SIGNS   Cardiovascular: Normal rate, regular rhythm, normal heart sounds and intact distal pulses  Exam reveals no gallop and no friction rub  No murmur heard  Pulmonary/Chest: Effort normal and breath sounds normal  No stridor  No respiratory distress  She has no wheezes  She has no rales  She exhibits no tenderness  Abdominal: Soft  Bowel sounds are normal  She exhibits no distension and no mass  There is no tenderness  There is no rebound and no guarding  No hernia  Musculoskeletal: Normal range of motion  She exhibits no edema, tenderness or deformity  Lymphadenopathy:     She has no cervical adenopathy  Neurological: She is alert and oriented to person, place, and time  No cranial nerve deficit or sensory deficit  She exhibits normal muscle tone  Coordination normal    Skin: Skin is warm  Capillary refill takes less than 2 seconds  No rash noted  She is not diaphoretic  No erythema  No pallor  Psychiatric: She has a normal mood and affect  Her behavior is normal    Nursing note and vitals reviewed        Vital Signs  ED Triage Vitals   Temperature Pulse Respirations Blood Pressure SpO2   09/14/18 1812 09/14/18 1809 09/14/18 1809 09/14/18 1809 09/14/18 1809   98 °F (36 7 °C) 71 20 120/71 98 %      Temp Source Heart Rate Source Patient Position - Orthostatic VS BP Location FiO2 (%)   09/14/18 1812 09/14/18 2034 09/14/18 2034 09/14/18 2034 --   Oral Monitor Lying Right arm       Pain Score       09/14/18 1809       Worst Possible Pain           Vitals:    09/14/18 2034 09/14/18 2218 09/14/18 2233 09/14/18 2236   BP: 122/72 126/78 (!) 89/46 (!) 80/50   Pulse: 90 80 59    Patient Position - Orthostatic VS: Lying Sitting Lying Lying       Visual Acuity      ED Medications  Medications   dicyclomine (BENTYL) capsule 20 mg (20 mg Oral Given 9/15/18 0031)   ALPRAZolam (XANAX) tablet 0 25 mg (not administered)   nicotine (NICODERM CQ) 21 mg/24 hr TD 24 hr patch 1 patch (not administered)   metoclopramide (REGLAN) injection 10 mg (10 mg Intravenous Given 9/15/18 0031)   ketorolac (TORADOL) injection 30 mg (not administered)   diphenhydrAMINE (BENADRYL) injection 25 mg (not administered)   cyclobenzaprine (FLEXERIL) tablet 10 mg (not administered)   magnesium sulfate 2 g/50 mL IVPB (premix) 2 g (not administered)   haloperidol lactate (HALDOL) injection 5 mg (5 mg Intramuscular Given 9/14/18 1846)   diphenhydrAMINE (BENADRYL) injection 25 mg (25 mg Intramuscular Given 9/14/18 1847)   ketorolac (TORADOL) injection 9 9 mg (9 9 mg Intravenous Given 9/14/18 1951)   magnesium sulfate 2 g/50 mL IVPB (premix) 2 g (0 g Intravenous Stopped 9/14/18 2054)   metoclopramide (REGLAN) injection 5 mg (5 mg Intravenous Given 9/14/18 1951)   sodium chloride 0 9 % bolus 500 mL (0 mL Intravenous Stopped 9/14/18 2054)   bupivacaine (PF) (MARCAINE) 0 5 % injection 30 mL (30 mL Infiltration Given 9/14/18 2042)       Diagnostic Studies  Results Reviewed     None                 No orders to display              Procedures  Procedures       Phone Contacts  ED Phone Contact    ED Course  ED Course as of Sep 15 0046   Fri Sep 14, 2018   1834 Er med note-- 9/11/18 labs/ head ct report-  er chart reviewed by er md    1934 - er md note- pt- re-evaluated- no change - will order additional medications    2040 - er md note- pt- re-evaluated-  states no relef-- will attempt-- cervical block    2055 Er med procedure note- for paracervical b lock- pt made aware of risk/ infection bleeding- bilateral c7 paracervical muscle areas marked - and  5 ml of % 5 marcaine instilled sq over areas-- then under neg aspiration - total of 1 5 ml's of   5 % marcaine instilled over areas by er md - pt tolerated procedure well with no complications- no bleeding    2122 Er md note- - pt - re-evaluated- feels improved after paracervical block --  disposition discussed with pt and --  pt does not want to go back home w and have worsening headache and have to return agree to hosp admit- slim paged                                MDM  CritCare Time    Disposition  Final diagnoses:   Intractable headache, unspecified chronicity pattern, unspecified headache type     Time reflects when diagnosis was documented in both MDM as applicable and the Disposition within this note     Time User Action Codes Description Comment    9/14/2018  9:40 PM Leatha Mckeon Add [R51] Intractable headache, unspecified chronicity pattern, unspecified headache type       ED Disposition     ED Disposition Condition Comment    Admit  Case was discussed with DR BARRY and the patient's admission status was agreed to be Admission Status: observation status to the service of Dr Sun Donnelly   Follow-up Information    None         Current Discharge Medication List      CONTINUE these medications which have NOT CHANGED    Details   butalbital-acetaminophen-caffeine (FIORICET,ESGIC) -40 mg per tablet Take 1 tablet by mouth every 4 (four) hours as needed for headaches  Qty: 20 tablet, Refills: 0    Associated Diagnoses: Headache      metoclopramide (REGLAN) 10 mg tablet Take 1 tablet (10 mg total) by mouth every 6 (six) hours as needed (Nausea vomiting)  Qty: 30 tablet, Refills: 0    Associated Diagnoses: Headache      oxyCODONE-acetaminophen (PERCOCET) 5-325 mg per tablet Take 1 tablet by mouth every 4 (four) hours as needed for moderate pain for up to 15 doses   Max Daily Amount: 6 tablets  Qty: 15 tablet, Refills: 0      ALPRAZolam (XANAX) 0 25 mg tablet Take 1 tablet by mouth daily as needed      dicyclomine (BENTYL) 20 mg tablet Take 1 tablet by mouth daily at bedtime      Ergocalciferol (ERGOCAL PO) Take 5,000 Units by mouth daily           No discharge procedures on file      ED Provider  Electronically Signed by           Rd Kahn MD  09/15/18 2546

## 2018-09-15 NOTE — ASSESSMENT & PLAN NOTE
· Dc'ed  From ED 9/11 with prescription for Fioricet  · CT head 9/11 unremarkable, basic labs unremarkable 9/11  · Returns today with persisting headache, denies h/o migraine   · Received migraine cocktail in ED with minimal relief   · Scheduled for OP appointment with Neurology on 9/28  · Neuro exam unremarkable  · Will continue migraine cocktail and supportive care for HA  · Suspect element of musculoskeletal spasm contributing, prn muscle relaxer and aqua K   · Will improvement in headache today  · Will ask for neurology evaluation  · History is consistent with migraine with ocular features

## 2018-09-15 NOTE — H&P
H&P- Manjinder Baugh 1980, 45 y o  female MRN: [id number]    Unit/Bed#: -01 Encounter: [id number]    Primary Care Provider: Cheng Shaw MD   Date and time admitted to hospital: 9/14/2018  6:08 PM    * Headache   Assessment & Plan    · Dc'ed  From ED 9/11 with prescription for Fioricet  · CT head 9/11 unremarkable, basic labs unremarkable 9/11  · Returns today with persisting headache, denies h/o migraine   · Received migraine cocktail in ED with minimal relief   · Scheduled for OP appointment with Neurology on 9/28  · Neuro exam unremarkable  · Will continue migraine cocktail and supportive care for HA  · Suspect element of musculoskeletal spasm contributing, prn muscle relaxer and aqua K   · If no improvement, consider consult to Neurology/MRI brain          VTE Prophylaxis: low risk, ambulate  / reason for no mechanical VTE prophylaxis : ambulate   Code Status: FULL  POLST: POLST form is not discussed and not completed at this time  Discussion with family: none    Anticipated Length of Stay:  Patient will be admitted on an Observation basis with an anticipated length of stay of  < 2 midnights  Justification for Hospital Stay: per plan above    Total Time for Visit, including Counseling / Coordination of Care: 30 minutes  Greater than 50% of this total time spent on direct patient counseling and coordination of care  Chief Complaint:   headache    History of Present Illness:    Manjinder Baugh is a 45 y o  female with who presents with headache  Pt states her headache began 7 weeks ago  She states originally the pain was on the left side of her head and behind her left eye and also reports "seeing yellow" in that eye  She says the past two weeks her pain has significantly increased  She describes the pain as fluctuating in nature between throbbing and sharp  She says recently her pain migrated to her right eye now  She also admits to tinnitus in the left ear    She says she has had headaches before this intermittently that she says were provoked by lack of sleep but that they were relieved by taking excedrin migraine once  She says she has never had a headache this severe or with this pattern  She admits to some paraesthesias in her left leg at baseline d/t sciatica but reports some left arm paraesthesias before her headache started  She says she has had exacerbation of her neck pain recently as well  She admits to increased adjustments at the chiropractor in the past couple months  She admits to some left sided neck stiffness and does reports that her headache started as occipital/neck pain  Denies syncope, slurred speech, facial droop or any loss of consciousness  She admits to photophobia but denies neck stiffness  Admits to palpitations with severe pain that resolve within seconds without intervention  Admits to nausea, denies vomiting, abdominal pain or diarrhea  Does admit she has been sleeping less due to her headache  Denies change in caffeine intake  Denies chest pain, shortness of breath, LE edema  Denies recent URI symptoms  Review of Systems:    Review of Systems   Constitutional: Positive for appetite change  HENT: Negative  Eyes: Positive for photophobia, pain and visual disturbance  Respiratory: Negative  Cardiovascular: Positive for palpitations  Gastrointestinal: Positive for nausea  Endocrine: Negative  Genitourinary: Negative  Musculoskeletal: Positive for neck pain and neck stiffness  Skin: Negative  Allergic/Immunologic: Negative  Neurological: Positive for headaches  Hematological: Negative  Psychiatric/Behavioral: Negative          Past Medical and Surgical History:     Past Medical History:   Diagnosis Date    Migraine     Ovarian cyst     left       Past Surgical History:   Procedure Laterality Date    HYSTERECTOMY      KNEE SURGERY         Meds/Allergies:    Prior to Admission medications    Medication Sig Start Date End Date Taking? Authorizing Provider   butalbital-acetaminophen-caffeine (FIORICET,ESGIC) -40 mg per tablet Take 1 tablet by mouth every 4 (four) hours as needed for headaches 9/11/18  Yes Jennifer Kirkpatrick, DO   metoclopramide (REGLAN) 10 mg tablet Take 1 tablet (10 mg total) by mouth every 6 (six) hours as needed (Nausea vomiting) 9/11/18  Yes Jennifer Kirkpatrick, DO   oxyCODONE-acetaminophen (PERCOCET) 5-325 mg per tablet Take 1 tablet by mouth every 4 (four) hours as needed for moderate pain for up to 15 doses  Max Daily Amount: 6 tablets 6/16/16  Yes Samia Neri MD   ALPRAZolam Raciel Dearth) 0 25 mg tablet Take 1 tablet by mouth daily as needed    Historical Provider, MD   dicyclomine (BENTYL) 20 mg tablet Take 1 tablet by mouth daily at bedtime    Historical Provider, MD   Ergocalciferol (ERGOCAL PO) Take 5,000 Units by mouth daily    Historical Provider, MD     I have reviewed home medications with patient personally  Allergies: No Known Allergies    Social History:     Marital Status: /Civil Union   Occupation: finance   Patient Pre-hospital Living Situation: home with   Patient Pre-hospital Level of Mobility: independent  Patient Pre-hospital Diet Restrictions: none  Substance Use History:   History   Alcohol Use    Yes     Comment: social     History   Smoking Status    Current Every Day Smoker    Packs/day: 1 00   Smokeless Tobacco    Not on file     History   Drug Use No       Family History:    non-contributory    Physical Exam:     Vitals:   Blood Pressure: (!) 80/50 (09/14/18 2236)  Pulse: 59 (09/14/18 2233)  Temperature: (!) 97 4 °F (36 3 °C) (09/14/18 2233)  Temp Source: Oral (09/14/18 2233)  Respirations: 18 (09/14/18 2233)  Height: 5' 4" (162 6 cm) (09/14/18 2233)  Weight - Scale: 77 kg (169 lb 12 1 oz) (09/14/18 2233)  SpO2: 99 % (09/14/18 2233)    Physical Exam   Constitutional: She appears well-developed and well-nourished  No distress  HENT:   Head: Normocephalic  Mouth/Throat: Oropharynx is clear and moist    Cardiovascular: Normal rate, regular rhythm, normal heart sounds and intact distal pulses  Exam reveals no gallop and no friction rub  No murmur heard  Pulmonary/Chest: Effort normal and breath sounds normal  No respiratory distress  She has no wheezes  She has no rales  She exhibits no tenderness  Abdominal: Soft  Bowel sounds are normal  She exhibits no distension and no mass  There is no tenderness  There is no rebound and no guarding  Neurological: She is alert  She displays no atrophy and no tremor  No sensory deficit  She exhibits normal muscle tone  She displays no seizure activity  Finger to nose, rapid alternating movements intact  Negative pronator drift    Skin: Skin is warm and dry  No rash noted  She is not diaphoretic  No erythema  No pallor  Psychiatric: She has a normal mood and affect  Her behavior is normal    Nursing note and vitals reviewed  Additional Data:     Lab Results: I have personally reviewed pertinent reports  Results from last 7 days  Lab Units 09/11/18  1837   WBC Thousand/uL 7 26   HEMOGLOBIN g/dL 14 2   HEMATOCRIT % 41 0   PLATELETS Thousands/uL 318   NEUTROS PCT % 58   LYMPHS PCT % 31   MONOS PCT % 7   EOS PCT % 3       Results from last 7 days  Lab Units 09/11/18  1837   SODIUM mmol/L 143   POTASSIUM mmol/L 3 6   CHLORIDE mmol/L 104   CO2 mmol/L 27   BUN mg/dL 7   CREATININE mg/dL 0 79   CALCIUM mg/dL 8 7   ALK PHOS U/L 82   ALT U/L 34   AST U/L 15                   Imaging: I have personally reviewed pertinent reports  No orders to display       Allscripts / Epic Records Reviewed: Yes     ** Please Note: This note has been constructed using a voice recognition system   **

## 2018-09-16 PROCEDURE — 99225 PR SBSQ OBSERVATION CARE/DAY 25 MINUTES: CPT | Performed by: INTERNAL MEDICINE

## 2018-09-16 PROCEDURE — 99232 SBSQ HOSP IP/OBS MODERATE 35: CPT | Performed by: PSYCHIATRY & NEUROLOGY

## 2018-09-16 RX ORDER — DIHYDROERGOTAMINE MESYLATE 1 MG/ML
1 INJECTION, SOLUTION INTRAMUSCULAR; INTRAVENOUS; SUBCUTANEOUS 3 TIMES DAILY
Status: DISCONTINUED | OUTPATIENT
Start: 2018-09-16 | End: 2018-09-17

## 2018-09-16 RX ORDER — METOCLOPRAMIDE HYDROCHLORIDE 5 MG/ML
10 INJECTION INTRAMUSCULAR; INTRAVENOUS EVERY 8 HOURS SCHEDULED
Status: DISCONTINUED | OUTPATIENT
Start: 2018-09-16 | End: 2018-09-19 | Stop reason: HOSPADM

## 2018-09-16 RX ORDER — DIAZEPAM 5 MG/1
5 TABLET ORAL EVERY 6 HOURS PRN
Status: DISCONTINUED | OUTPATIENT
Start: 2018-09-16 | End: 2018-09-18

## 2018-09-16 RX ADMIN — METOCLOPRAMIDE 10 MG: 5 INJECTION, SOLUTION INTRAMUSCULAR; INTRAVENOUS at 13:28

## 2018-09-16 RX ADMIN — KETOROLAC TROMETHAMINE 30 MG: 30 INJECTION, SOLUTION INTRAMUSCULAR at 13:23

## 2018-09-16 RX ADMIN — METOCLOPRAMIDE 10 MG: 5 INJECTION, SOLUTION INTRAMUSCULAR; INTRAVENOUS at 19:01

## 2018-09-16 RX ADMIN — KETOROLAC TROMETHAMINE 30 MG: 30 INJECTION, SOLUTION INTRAMUSCULAR at 01:54

## 2018-09-16 RX ADMIN — BACLOFEN 10 MG: 10 TABLET ORAL at 09:30

## 2018-09-16 RX ADMIN — MAGNESIUM SULFATE HEPTAHYDRATE 1 G: 1 INJECTION, SOLUTION INTRAVENOUS at 09:30

## 2018-09-16 RX ADMIN — DIHYDROERGOTAMINE MESYLATE 1 MG: 1 INJECTION, SOLUTION INTRAMUSCULAR; INTRAVENOUS; SUBCUTANEOUS at 19:24

## 2018-09-16 RX ADMIN — VALPROATE SODIUM 750 MG: 100 INJECTION, SOLUTION INTRAVENOUS at 21:18

## 2018-09-16 RX ADMIN — SODIUM CHLORIDE 500 MG: 0.9 INJECTION, SOLUTION INTRAVENOUS at 19:25

## 2018-09-16 RX ADMIN — Medication 5 MG: at 05:24

## 2018-09-16 RX ADMIN — METOCLOPRAMIDE 10 MG: 5 INJECTION, SOLUTION INTRAMUSCULAR; INTRAVENOUS at 05:24

## 2018-09-16 RX ADMIN — DIAZEPAM 5 MG: 5 TABLET ORAL at 15:13

## 2018-09-16 RX ADMIN — BACLOFEN 10 MG: 10 TABLET ORAL at 21:18

## 2018-09-16 RX ADMIN — BACLOFEN 10 MG: 10 TABLET ORAL at 15:50

## 2018-09-16 RX ADMIN — DICYCLOMINE HYDROCHLORIDE 20 MG: 10 CAPSULE ORAL at 21:18

## 2018-09-16 RX ADMIN — Medication 5 MG: at 00:31

## 2018-09-16 NOTE — CASE MANAGEMENT
Thank you,  [address]  Utilization Review Department  Phone: [phone]; Fax [phone]  ATTENTION: Please call with any questions or concerns to [phone]  and carefully follow the prompts so that you are directed to the right person  Send all requests for admission clinical reviews, approved or denied determinations and any other requests to fax [phone]   All voicemails are confidential    Continued Stay Review    Date: 09/16/2018    Vital Signs: BP 95/52 (BP Location: Left arm)   Pulse 62   Temp 97 9 °F (36 6 °C) (Oral)   Resp 18   Ht 5' 4" (1 626 m)   Wt 77 kg (169 lb 12 1 oz)   SpO2 97%   BMI 29 14 kg/m²     Medications:   Scheduled Meds:   Current Facility-Administered Medications:  ALPRAZolam 0 25 mg Oral Daily PRN Ragini Navarro PA-C   baclofen 10 mg Oral TID Hemal Tovar MD   dicyclomine 20 mg Oral HS Kylah Miller PA-C   diphenhydrAMINE 25 mg Intravenous Q8H PRN Ragini Navarro PA-C   ketorolac 30 mg Intravenous Q12H Kylah Miller PA-C   magnesium sulfate 1 g Intravenous BID Hemal Tovar MD   metoclopramide 10 mg Intravenous Q8H [address] Kylah aCo PA-C   nicotine 1 patch Transdermal Daily Kylah Miller PA-C     Continuous Infusions:    PRN Meds:   ALPRAZolam    diphenhydrAMINE    Abnormal Labs/Diagnostic Results: none    Age/Sex: 45 y o  female     Assessment/Plan: no new  MD notes    Discharge Plan: tbd

## 2018-09-16 NOTE — PROGRESS NOTES
Progress Note - Dedrick Bauer 1980, 45 y o  female MRN: [id number]    Unit/Bed#: -01 Encounter: [id number]    Primary Care Provider: Setphanie Blackwell MD   Date and time admitted to hospital: 2018  6:08 PM        * Headache   Assessment & Plan    Patient continues to persist with muscle pain now reports headache  Discussed with Neurology, Dr Alpesh Gomez will re-evaluate the patient today  Continue supportive cares with muscle relaxants baclofen Valium aqua K-pad          VTE Pharmacologic Prophylaxis:   Pharmacologic: Vena dynes  Mechanical VTE Prophylaxis in Place: Yes    Patient Centered Rounds: I have performed bedside rounds with nursing staff today  Discussions with Specialists or Other Care Team Provider:  Neurology    Education and Discussions with Family / Patient:  Discussed with the patient    Time Spent for Care: 30 minutes  More than 50% of total time spent on counseling and coordination of care as described above  Current Length of Stay: 0 day(s)    Current Patient Status: Observation   Certification Statement: The patient will continue to require additional inpatient hospital stay due to As mentioned    Discharge Plan:  Awaiting clinical and symptomatic improvement, awaiting Neurology inputs    Code Status: Level 1 - Full Code      Subjective:     Complains of headache and neck pain which is persistent since yesterday  Reports nausea  History chart labs medications    Objective:     Vitals:   Temp (24hrs), Av 2 °F (36 8 °C), Min:97 9 °F (36 6 °C), Max:98 7 °F (37 1 °C)    HR:  [58-62] 58  Resp:  [18] 18  BP: ()/(52-58) 109/58  SpO2:  [92 %-97 %] 96 %  Body mass index is 29 14 kg/m²  Input and Output Summary (last 24 hours):        Intake/Output Summary (Last 24 hours) at 18 1709  Last data filed at 18 1333   Gross per 24 hour   Intake             1480 ml   Output                0 ml   Net             1480 ml       Physical Exam:     Physical Exam    Comfortably lying in bed  Neck supple  Lungs diminished breath sounds bases  Heart sounds S1-S2 noted  Abdomen soft  Awake obeys simple commands  No rash  Pulses present      Additional Data:     Labs:      Results from last 7 days  Lab Units 09/11/18  1837   WBC Thousand/uL 7 26   HEMOGLOBIN g/dL 14 2   HEMATOCRIT % 41 0   PLATELETS Thousands/uL 318   NEUTROS PCT % 58   LYMPHS PCT % 31   MONOS PCT % 7   EOS PCT % 3       Results from last 7 days  Lab Units 09/11/18  1837   SODIUM mmol/L 143   POTASSIUM mmol/L 3 6   CHLORIDE mmol/L 104   CO2 mmol/L 27   BUN mg/dL 7   CREATININE mg/dL 0 79   CALCIUM mg/dL 8 7   ALK PHOS U/L 82   ALT U/L 34   AST U/L 15                     * I Have Reviewed All Lab Data Listed Above  * Additional Pertinent Lab Tests Reviewed: All Labs Within Last 24 Hours Reviewed    Imaging:    Imaging Reports Reviewed Today Include:  Imaging studies reviewed  Imaging Personally Reviewed by Myself Includes:     Recent Cultures (last 7 days):           Last 24 Hours Medication List:     Current Facility-Administered Medications:  ALPRAZolam 0 25 mg Oral Daily PRN Radha Collins PA-C   baclofen 10 mg Oral TID Edison Spivey MD   diazepam 5 mg Oral Q6H PRN Luis Addison MD   dicyclomine 20 mg Oral HS Kylah Miller PA-C   diphenhydrAMINE 25 mg Intravenous Q8H PRN Radha Collins PA-C   ketorolac 30 mg Intravenous Q12H Kylah Miller PA-C   magnesium sulfate 1 g Intravenous BID Edison Spivey MD   metoclopramide 10 mg Intravenous Q8H [id number]   nicotine 1 patch Transdermal Daily Radha Collins PA-C        Today, Patient Was Seen By: Luis Addison MD    ** Please Note: Dictation voice to text software may have been used in the creation of this document   **

## 2018-09-16 NOTE — ASSESSMENT & PLAN NOTE
Patient continues to persist with muscle pain now reports headache  Discussed with Neurology, Dr Webber Point will re-evaluate the patient today  Continue supportive cares with muscle relaxants baclofen Valium aqua K-pad

## 2018-09-16 NOTE — PROGRESS NOTES
Neurology - Progress Note  Dedrick Bauer 45 y o  female MRN: [id number]  Unit/Bed#: -01 Encounter: [id number]    Assessment:  Cervicogenic HAs, ongoing with now more intense migrainous feature becoming the primary complaint and neck pain improved has improved somewhat  No evidence of myelopathy or radiculopathy  Initial plan was to focus on eliminating the neck pain with goal of thereby eliminating her headache however this has not occurred  She has already received the majority of our migraine cocktail  IV valium, magnesium, toradol has not helped therefore they will be discontinued  Plan:  Mri brain w/wo contrast, mri c spine w/wo contrast, mrv  dhe 1 mg po q8 hr preceded by reglan treatment  Continue baclofen 10 mg po tid  Solumedrol 500 mg iv bid  depacon 1000 mg po qhs (negative pregnancy test)    Chief Complaint: unrelenting HA      Subjective:   Stabbing/pulsing in both ears, HA 10/10, worse now, neck pain improved down to 7/10  Light sensitivity, mild nausea  Also chest pain  ROS:  Per 12 point review, see above in subjective  Vitals: Blood pressure 109/58, pulse 58, temperature 98 7 °F (37 1 °C), temperature source Oral, resp  rate 18, height 5' 4" (1 626 m), weight 77 kg (169 lb 12 1 oz), SpO2 96 %  ,Body mass index is 29 14 kg/m²  Physical Exam:    General appearance: alert, appears stated age and cooperative  Lungs: clear to auscultation bilaterally  Heart: regular rate and rhythm  Musculoskeletal: reproducible left chest wall tenderness, tenderness upon palpation of left temple region, levator scapulae, splenius capitis although she feels pain throughout entire neck      Neurologic: Mental status: Alert, orientedX3, thought content appropriate  Cn 2-12 intact  Motor: normal full power age appropriate x 4 limbs              Lab, Imaging and other studies: I have personally reviewed pertinent reports          Total 35 min spent evaluating patient, reviewing regimen, coming up with assessment/plan

## 2018-09-17 ENCOUNTER — APPOINTMENT (INPATIENT)
Dept: MRI IMAGING | Facility: HOSPITAL | Age: 38
DRG: 103 | End: 2018-09-17
Payer: COMMERCIAL

## 2018-09-17 PROBLEM — Z72.0 TOBACCO ABUSE: Chronic | Status: ACTIVE | Noted: 2018-09-17

## 2018-09-17 PROCEDURE — 70544 MR ANGIOGRAPHY HEAD W/O DYE: CPT

## 2018-09-17 PROCEDURE — 99232 SBSQ HOSP IP/OBS MODERATE 35: CPT | Performed by: INTERNAL MEDICINE

## 2018-09-17 PROCEDURE — A9585 GADOBUTROL INJECTION: HCPCS | Performed by: PSYCHIATRY & NEUROLOGY

## 2018-09-17 PROCEDURE — 70553 MRI BRAIN STEM W/O & W/DYE: CPT

## 2018-09-17 PROCEDURE — 99233 SBSQ HOSP IP/OBS HIGH 50: CPT | Performed by: PSYCHIATRY & NEUROLOGY

## 2018-09-17 PROCEDURE — 72156 MRI NECK SPINE W/O & W/DYE: CPT

## 2018-09-17 RX ORDER — LIDOCAINE 50 MG/G
1 PATCH TOPICAL DAILY
Status: DISCONTINUED | OUTPATIENT
Start: 2018-09-18 | End: 2018-09-18

## 2018-09-17 RX ORDER — MAGNESIUM SULFATE HEPTAHYDRATE 40 MG/ML
2 INJECTION, SOLUTION INTRAVENOUS
Status: DISCONTINUED | OUTPATIENT
Start: 2018-09-17 | End: 2018-09-19 | Stop reason: HOSPADM

## 2018-09-17 RX ORDER — LOPERAMIDE HYDROCHLORIDE 2 MG/1
2 CAPSULE ORAL 3 TIMES DAILY PRN
Status: DISCONTINUED | OUTPATIENT
Start: 2018-09-17 | End: 2018-09-19 | Stop reason: HOSPADM

## 2018-09-17 RX ORDER — DIHYDROERGOTAMINE MESYLATE 1 MG/ML
1 INJECTION, SOLUTION INTRAMUSCULAR; INTRAVENOUS; SUBCUTANEOUS 3 TIMES DAILY
Status: DISCONTINUED | OUTPATIENT
Start: 2018-09-17 | End: 2018-09-18

## 2018-09-17 RX ORDER — KETOROLAC TROMETHAMINE 30 MG/ML
30 INJECTION, SOLUTION INTRAMUSCULAR; INTRAVENOUS ONCE
Status: COMPLETED | OUTPATIENT
Start: 2018-09-17 | End: 2018-09-17

## 2018-09-17 RX ADMIN — DIAZEPAM 5 MG: 5 TABLET ORAL at 13:58

## 2018-09-17 RX ADMIN — MAGNESIUM SULFATE HEPTAHYDRATE 2 G: 40 INJECTION, SOLUTION INTRAVENOUS at 18:33

## 2018-09-17 RX ADMIN — SODIUM CHLORIDE 500 MG: 0.9 INJECTION, SOLUTION INTRAVENOUS at 06:17

## 2018-09-17 RX ADMIN — BACLOFEN 10 MG: 10 TABLET ORAL at 16:39

## 2018-09-17 RX ADMIN — METOCLOPRAMIDE 10 MG: 5 INJECTION, SOLUTION INTRAMUSCULAR; INTRAVENOUS at 22:27

## 2018-09-17 RX ADMIN — METOCLOPRAMIDE 10 MG: 5 INJECTION, SOLUTION INTRAMUSCULAR; INTRAVENOUS at 16:30

## 2018-09-17 RX ADMIN — SODIUM CHLORIDE 500 MG: 0.9 INJECTION, SOLUTION INTRAVENOUS at 19:42

## 2018-09-17 RX ADMIN — BACLOFEN 10 MG: 10 TABLET ORAL at 21:20

## 2018-09-17 RX ADMIN — ALPRAZOLAM 0.25 MG: 0.25 TABLET ORAL at 22:34

## 2018-09-17 RX ADMIN — VALPROATE SODIUM 750 MG: 100 INJECTION, SOLUTION INTRAVENOUS at 21:20

## 2018-09-17 RX ADMIN — KETOROLAC TROMETHAMINE 30 MG: 30 INJECTION, SOLUTION INTRAMUSCULAR at 02:34

## 2018-09-17 RX ADMIN — GABAPENTIN 1800 MG: 400 CAPSULE ORAL at 21:19

## 2018-09-17 RX ADMIN — KETOROLAC TROMETHAMINE 30 MG: 30 INJECTION, SOLUTION INTRAMUSCULAR at 18:12

## 2018-09-17 RX ADMIN — METOCLOPRAMIDE 10 MG: 5 INJECTION, SOLUTION INTRAMUSCULAR; INTRAVENOUS at 08:35

## 2018-09-17 RX ADMIN — DIHYDROERGOTAMINE MESYLATE 1 MG: 1 INJECTION, SOLUTION INTRAMUSCULAR; INTRAVENOUS; SUBCUTANEOUS at 21:24

## 2018-09-17 RX ADMIN — DIHYDROERGOTAMINE MESYLATE 1 MG: 1 INJECTION, SOLUTION INTRAMUSCULAR; INTRAVENOUS; SUBCUTANEOUS at 16:36

## 2018-09-17 RX ADMIN — DIHYDROERGOTAMINE MESYLATE 1 MG: 1 INJECTION, SOLUTION INTRAMUSCULAR; INTRAVENOUS; SUBCUTANEOUS at 08:42

## 2018-09-17 RX ADMIN — DIAZEPAM 5 MG: 5 TABLET ORAL at 08:34

## 2018-09-17 RX ADMIN — LOPERAMIDE HYDROCHLORIDE 2 MG: 2 CAPSULE ORAL at 13:50

## 2018-09-17 RX ADMIN — GADOBUTROL 7 ML: 604.72 INJECTION INTRAVENOUS at 13:54

## 2018-09-17 RX ADMIN — KETOROLAC TROMETHAMINE 30 MG: 30 INJECTION, SOLUTION INTRAMUSCULAR at 13:50

## 2018-09-17 RX ADMIN — BACLOFEN 10 MG: 10 TABLET ORAL at 08:34

## 2018-09-17 RX ADMIN — DICYCLOMINE HYDROCHLORIDE 20 MG: 10 CAPSULE ORAL at 21:19

## 2018-09-17 RX ADMIN — DIPHENHYDRAMINE HYDROCHLORIDE 25 MG: 50 INJECTION, SOLUTION INTRAMUSCULAR; INTRAVENOUS at 13:58

## 2018-09-17 NOTE — ASSESSMENT & PLAN NOTE
· Persistent   Initially felt to be cervicogenic but despite neck pain improving, headaches have not  · Neurology following, input is appreciated  · MRI brain, MRI C-Spine and MRV ordered, follow up results  · Continue baclofen, solumedrol BID, depacon qHS and DHE q8h per neuro recommendations

## 2018-09-17 NOTE — PROGRESS NOTES
Progress Note - Neurology   Henrry Parker 45 y o  female [id number]  Unit/Bed#: /-01    Assessment:  Henrry Parker is a 45 y o  female with a history of migraines who presented on 9/14 with a 7 week history of neck pain with associated photo and phono phobia  Her neck pain has progressed from intermittent to continuous  Subsequently she developed worsening head and retro-orbital pain, associated with continuous severe neck pain, as well as ringing in her ears and an intense banging sound  Plan:  Cervicogenic headache with associated migrainous features  Neck pain has improved significantly  Head pain has improved, though patient states it is still not at a tolerable level  MRI brain, MRI Cspine and MRV pending  Continue DHE 1mg po q8hrs (s/p 2 doses) preceded by Reglan treatment, Solumedrol 500mg IV bid (s/p 2 doses), Depacon 1000mg po qhs (s/p 1 dose)  Continue Baclofen 10mg tid  Add back Magnesium 2g daily  Give one time dose of Gabapentin 1800mg qhs  Add Lidocaine patch to neck  Outpatient PT for stretching exercises for the cervical neck  Keep existing outpatient new neurology appointment (patient states it is with an [facility])    Subjective: This morning the patient states that her Dione Julien is still there    The patient currently rates head pain as 7/10 stating that it originated in her neck and has traveled up to the posterior lateral parts of her head bilaterally  She denies any retro-orbital pain this morning  The patient states a tolerable pain level is of 5  Currently the patient's neck pain is rated as a 5 though she states that this has dramatically improved and she does not seem to be concerned about this  The patient does admit to minimal photo and phonophobia as well as intermittent nausea  She denies vomiting  Of note, when I turned on the light to do her exam, the patient did not appear to be photophobic, she also was not noted to be grimacing during my exam at all  Patient does state that she feels the DHE is helping  VSS  Patient was initially hypotensive on admission, but pressures have normalized  Past Medical History:   Diagnosis Date    Migraine     Ovarian cyst     left     Past Surgical History:   Procedure Laterality Date    HYSTERECTOMY      KNEE SURGERY       Family history:  History reviewed  No pertinent family history  Social History     Social History    Marital status: /Civil Union     Spouse name: N/A    Number of children: N/A    Years of education: N/A     Social History Main Topics    Smoking status: Current Every Day Smoker     Packs/day: 1 00    Smokeless tobacco: None    Alcohol use Yes      Comment: social    Drug use: No    Sexual activity: Not Asked     Other Topics Concern    None     Social History Narrative    None       Scheduled Meds:  Current Facility-Administered Medications:  ALPRAZolam 0 25 mg Oral Daily PRN Wicho Zayas PA-C    baclofen 10 mg Oral TID Demetrius Guy MD    diazepam 5 mg Oral Q6H PRN Jennifer Moctezuma MD    dicyclomine 20 mg Oral HS Kylah Miller PA-C    dihydroergotamine 1 mg Intravenous TID Demetrius Guy MD    diphenhydrAMINE 25 mg Intravenous Q8H PRN Wicho Zayas PA-C    ketorolac 30 mg Intravenous Q12H Kylah Miller PA-C    methylPREDNISolone sodium succinate 500 mg Intravenous Q12H [facility] Demetrius Guy MD Last Rate: 500 mg (09/17/18 0617)   metoclopramide 10 mg Intravenous Q8H [facility] Demetrius Guy MD    nicotine 1 patch Transdermal Daily Kylah Miller PA-C    valproate sodium 750 mg Intravenous HS Demetrius Guy MD Last Rate: 750 mg (09/16/18 2118)     Continuous Infusions:   PRN Meds:   ALPRAZolam    diazepam    diphenhydrAMINE    ROS: A 12 point ROS was completed and other than the above mentioned complaints in the HPI, all remaining systems were negative       Vitals: /56 (BP Location: Right arm)   Pulse 64   Temp 97 8 °F (36 6 °C) (Oral)   Resp 18   Ht 5' 4" (1 626 m)   Wt 77 kg (169 lb 12 1 oz)   SpO2 96%   BMI 29 14 kg/m²     Physical Exam:   Physical Exam   Constitutional: She is oriented to person, place, and time  She appears well-developed and well-nourished  No distress  Supine in bed with legs elevated   HENT:   Head: Normocephalic and atraumatic  Eyes: Conjunctivae and EOM are normal  Pupils are equal, round, and reactive to light  Right eye exhibits no discharge  Left eye exhibits no discharge  No scleral icterus  Neck: Normal range of motion  Neck supple  Cardiovascular: Normal rate and regular rhythm  Exam reveals no gallop and no friction rub  No murmur heard  Pulmonary/Chest: Effort normal and breath sounds normal  No stridor  No respiratory distress  She has no wheezes  She has no rales  She exhibits no tenderness  Musculoskeletal: Normal range of motion  She exhibits no edema, tenderness or deformity  Lymphadenopathy:     She has no cervical adenopathy  Neurological: She is alert and oriented to person, place, and time  Reflex Scores:       Tricep reflexes are 2+ on the right side and 2+ on the left side  Bicep reflexes are 2+ on the right side and 2+ on the left side  Brachioradialis reflexes are 2+ on the right side and 2+ on the left side  Patellar reflexes are 2+ on the right side and 2+ on the left side  Achilles reflexes are 2+ on the right side and 2+ on the left side  Skin: Skin is warm and dry  No rash noted  No erythema  Psychiatric: She has a normal mood and affect  Her speech is normal and behavior is normal  Judgment and thought content normal      Neurologic Exam     Mental Status   Oriented to person, place, and time  Follows 2 step commands  Attention: normal  Concentration: normal    Speech: speech is normal (No dysarthria or aphasia)  Level of consciousness: alert  Knowledge: good  Normal comprehension       Cranial Nerves     CN II   Visual acuity: normal (Grossly)  Right visual field deficit: none  Left visual field deficit: none     CN III, IV, VI   Pupils are equal, round, and reactive to light  Extraocular motions are normal    Nystagmus: none   Diplopia: none  Conjugate gaze: present    CN V   Facial sensation intact  CN VII   Facial expression full, symmetric  CN VIII   Hearing: intact    CN XI   CN XI normal      CN XII   CN XII normal      Motor Exam   Muscle bulk: normal  Overall muscle tone: normal  Right arm pronator drift: absent  Left arm pronator drift: absent    Strength   Strength 5/5 except as noted  L hamstring 4- (patient states this is due to chronic nerve injury)     Sensory Exam   Light touch normal    Temperature intact throughout, symmetric     Gait, Coordination, and Reflexes     Reflexes   Right brachioradialis: 2+  Left brachioradialis: 2+  Right biceps: 2+  Left biceps: 2+  Right triceps: 2+  Left triceps: 2+  Right patellar: 2+  Left patellar: 2+  Right achilles: 2+  Left achilles: 2+    Labs:  No results found for this or any previous visit (from the past 24 hour(s))  Imaging: I have personally reviewed pertinent imaging and PACS reports       VTE Prophylaxis: Sequential compression device (Venodyne)

## 2018-09-17 NOTE — PROGRESS NOTES
Progress Note - Rossana Mix 1980, 45 y o  female MRN: [id number]    Unit/Bed#: -01 Encounter: [id number]    Primary Care Provider: Christy Reynoso MD   Date and time admitted to hospital: 2018  6:08 PM    * Headache   Assessment & Plan    · Persistent  Initially felt to be cervicogenic but despite neck pain improving, headaches have not  · Neurology following, input is appreciated  · MRI brain, MRI C-Spine and MRV ordered, follow up results  · Continue baclofen, solumedrol BID, depacon qHS and DHE q8h per neuro recommendations         Tobacco abuse   Assessment & Plan    · Continue nicotine patch  · Cessation encouraged           VTE Pharmacologic Prophylaxis:   Pharmacologic: low risk  Mechanical VTE Prophylaxis in Place: Yes    Patient Centered Rounds: I have performed bedside rounds with nursing staff today  Discussions with Specialists or Other Care Team Provider: appreciate neurology input  Education and Discussions with Family / Patient: patient  Time Spent for Care: 30 minutes  More than 50% of total time spent on counseling and coordination of care as described above  Current Length of Stay: 0 day(s)    Current Patient Status: Observation   Certification Statement: The patient, admitted on an observation basis, will now require > 2 midnight hospital stay due to IV medications, imaging     Discharge Plan: not yet stable  Code Status: Level 1 - Full Code      Subjective:   Pt reports feeling mildly better today  Headache 7/10  No nausea/vomiting  She feels her neck pain is nearly gone and tolerable  Objective:     Vitals:   Temp (24hrs), Av 2 °F (36 8 °C), Min:97 8 °F (36 6 °C), Max:98 7 °F (37 1 °C)    HR:  [58-67] 64  Resp:  [16-18] 18  BP: (109-122)/(56-58) 117/56  SpO2:  [94 %-96 %] 96 %  Body mass index is 29 14 kg/m²  Input and Output Summary (last 24 hours):        Intake/Output Summary (Last 24 hours) at 18 0817  Last data filed at 18 2584 Gross per 24 hour   Intake              900 ml   Output                0 ml   Net              900 ml       Physical Exam:     Physical Exam   Constitutional: She is oriented to person, place, and time  No distress  Cardiovascular: Normal rate and regular rhythm  Pulmonary/Chest: Effort normal and breath sounds normal    Abdominal: Soft  Bowel sounds are normal  She exhibits no distension  There is no tenderness  Musculoskeletal: She exhibits tenderness (mild TTP cervical neck area )  Neurological: She is alert and oriented to person, place, and time  Skin: Skin is warm and dry  She is not diaphoretic  Psychiatric: She has a normal mood and affect  Nursing note and vitals reviewed  Additional Data:     Labs:      Results from last 7 days  Lab Units 09/11/18  1837   WBC Thousand/uL 7 26   HEMOGLOBIN g/dL 14 2   HEMATOCRIT % 41 0   PLATELETS Thousands/uL 318   NEUTROS PCT % 58   LYMPHS PCT % 31   MONOS PCT % 7   EOS PCT % 3       Results from last 7 days  Lab Units 09/11/18  1837   SODIUM mmol/L 143   POTASSIUM mmol/L 3 6   CHLORIDE mmol/L 104   CO2 mmol/L 27   BUN mg/dL 7   CREATININE mg/dL 0 79   CALCIUM mg/dL 8 7   ALK PHOS U/L 82   ALT U/L 34   AST U/L 15           * I Have Reviewed All Lab Data Listed Above    * Additional Pertinent Lab Tests Reviewed: No New Labs Available For Today    Imaging:    Imaging Reports Reviewed Today Include: none  Imaging Personally Reviewed by Myself Includes:  none    Recent Cultures (last 7 days):           Last 24 Hours Medication List:     Current Facility-Administered Medications:  ALPRAZolam 0 25 mg Oral Daily PRN Parul Dudley PA-C    baclofen 10 mg Oral TID Jones Burrows MD    diazepam 5 mg Oral Q6H PRN Linus Banegas MD    dicyclomine 20 mg Oral HS Kylah Orozco PA-C    dihydroergotamine 1 mg Intravenous TID Jones Burrows MD    diphenhydrAMINE 25 mg Intravenous Q8H PRN Parul Dudley PA-C    ketorolac 30 mg Intravenous Q12H Kylah MERINO Janeth Bond PA-C    methylPREDNISolone sodium succinate 500 mg Intravenous Q12H [address] Janie Last Rate: 500 mg (09/17/18 0617)   metoclopramide 10 mg Intravenous Q8H [address], MD    nicotine 1 patch Transdermal Daily US Villarreal, SHABBIR    valproate sodium 750 mg Intravenous HS Hildred Cranker, MD Last Rate: 750 mg (09/16/18 2118)        Today, Patient Was Seen By: Lourdes Trevizo PA-C    ** Please Note: Dictation voice to text software may have been used in the creation of this document   **

## 2018-09-17 NOTE — CASE MANAGEMENT
Thank you,  [address]  Utilization Review Department  Phone: [phone]; Fax [phone]  ATTENTION: Please call with any questions or concerns to [phone]  and carefully follow the prompts so that you are directed to the right person  Send all requests for admission clinical reviews, approved or denied determinations and any other requests to fax [phone]  All voicemails are confidential    Continued Stay Review       Date: 09/17/2018    Vital Signs: /56 (BP Location: Right arm)   Pulse 64   Temp 97 8 °F (36 6 °C) (Oral)   Resp 18   Ht 5' 4" (1 626 m)   Wt 77 kg (169 lb 12 1 oz)   SpO2 96%   BMI 29 14 kg/m²     Medications:   Scheduled Meds:   Current Facility-Administered Medications:  ALPRAZolam 0 25 mg Oral Daily PRN Albpino Kc PA-C    baclofen 10 mg Oral TID Ramiro Aguiar MD    diazepam 5 mg Oral Q6H PRN Juan Navarrete MD    dicyclomine 20 mg Oral HS Kylah Henderson PA-C    dihydroergotamine 1 mg Intravenous TID Ramiro Aguiar MD    diphenhydrAMINE 25 mg Intravenous Q8H PRN Tu Kc PA-C    ketorolac 30 mg Intravenous Q12H Kylah Henderson PA-C    methylPREDNISolone sodium succinate 500 mg Intravenous Q12H [address] MD Janie Last Rate: 500 mg (09/17/18 0617)   metoclopramide 10 mg Intravenous Q8H [facility] Ramiro Aguiar MD    nicotine 1 patch Transdermal Daily Kylah Henderson PA-C    valproate sodium 750 mg Intravenous HS Ramiro Aguiar MD Last Rate: 750 mg (09/16/18 2118)     Continuous Infusions:    PRN Meds:   ALPRAZolam    diazepam    diphenhydrAMINE    Abnormal Labs/Diagnostic Results: none    Age/Sex: 45 y o  female     Assessment/Plan:   Headache   Assessment & Plan     · Persistent   Initially felt to be cervicogenic but despite neck pain improving, headaches have not  · Neurology following, input is appreciated  · MRI brain, MRI C-Spine and MRV ordered, follow up results  · Continue baclofen, solumedrol BID, depacon qHS and DHE q8h per neuro recommendations           Tobacco abuse   Assessment & Plan     · Continue nicotine patch  · Cessation encouraged         09/16/2018: Neurology consult:  Assessment:  Cervicogenic HAs, ongoing with now more intense migrainous feature becoming the primary complaint and neck pain improved has improved somewhat  No evidence of myelopathy or radiculopathy  Initial plan was to focus on eliminating the neck pain with goal of thereby eliminating her headache however this has not occurred  She has already received the majority of our migraine cocktail    IV valium, magnesium, toradol has not helped therefore they will be discontinued        Plan:  Mri brain w/wo contrast, mri c spine w/wo contrast, mrv  dhe 1 mg po q8 hr preceded by reglan treatment  Continue baclofen 10 mg po tid  Solumedrol 500 mg iv bid  depacon 1000 mg po qhs (negative pregnancy test)     Chief Complaint: unrelenting HA    Discharge Plan: tbd

## 2018-09-18 LAB
ANION GAP SERPL CALCULATED.3IONS-SCNC: 10 MMOL/L (ref 4–13)
BASOPHILS # BLD AUTO: 0.02 THOUSANDS/ΜL (ref 0–0.1)
BASOPHILS NFR BLD AUTO: 0 % (ref 0–1)
BUN SERPL-MCNC: 7 MG/DL (ref 5–25)
CALCIUM SERPL-MCNC: 8.8 MG/DL (ref 8.3–10.1)
CHLORIDE SERPL-SCNC: 108 MMOL/L (ref 100–108)
CO2 SERPL-SCNC: 21 MMOL/L (ref 21–32)
CREAT SERPL-MCNC: 0.8 MG/DL (ref 0.6–1.3)
EOSINOPHIL # BLD AUTO: 0 THOUSAND/ΜL (ref 0–0.61)
EOSINOPHIL NFR BLD AUTO: 0 % (ref 0–6)
ERYTHROCYTE [DISTWIDTH] IN BLOOD BY AUTOMATED COUNT: 12.2 % (ref 11.6–15.1)
GFR SERPL CREATININE-BSD FRML MDRD: 94 ML/MIN/1.73SQ M
GLUCOSE SERPL-MCNC: 132 MG/DL (ref 65–140)
HCT VFR BLD AUTO: 40.7 % (ref 34.8–46.1)
HGB BLD-MCNC: 13.7 G/DL (ref 11.5–15.4)
IMM GRANULOCYTES # BLD AUTO: 0.17 THOUSAND/UL (ref 0–0.2)
IMM GRANULOCYTES NFR BLD AUTO: 1 % (ref 0–2)
LYMPHOCYTES # BLD AUTO: 0.87 THOUSANDS/ΜL (ref 0.6–4.47)
LYMPHOCYTES NFR BLD AUTO: 4 % (ref 14–44)
MCH RBC QN AUTO: 32.2 PG (ref 26.8–34.3)
MCHC RBC AUTO-ENTMCNC: 33.7 G/DL (ref 31.4–37.4)
MCV RBC AUTO: 96 FL (ref 82–98)
MONOCYTES # BLD AUTO: 0.41 THOUSAND/ΜL (ref 0.17–1.22)
MONOCYTES NFR BLD AUTO: 2 % (ref 4–12)
NEUTROPHILS # BLD AUTO: 19.81 THOUSANDS/ΜL (ref 1.85–7.62)
NEUTS SEG NFR BLD AUTO: 93 % (ref 43–75)
NRBC BLD AUTO-RTO: 0 /100 WBCS
PLATELET # BLD AUTO: 258 THOUSANDS/UL (ref 149–390)
PMV BLD AUTO: 11 FL (ref 8.9–12.7)
POTASSIUM SERPL-SCNC: 4.2 MMOL/L (ref 3.5–5.3)
RBC # BLD AUTO: 4.26 MILLION/UL (ref 3.81–5.12)
SODIUM SERPL-SCNC: 139 MMOL/L (ref 136–145)
WBC # BLD AUTO: 21.28 THOUSAND/UL (ref 4.31–10.16)

## 2018-09-18 PROCEDURE — 85025 COMPLETE CBC W/AUTO DIFF WBC: CPT | Performed by: INTERNAL MEDICINE

## 2018-09-18 PROCEDURE — 3E023BZ INTRODUCTION OF ANESTHETIC AGENT INTO MUSCLE, PERCUTANEOUS APPROACH: ICD-10-PCS | Performed by: PSYCHIATRY & NEUROLOGY

## 2018-09-18 PROCEDURE — 80048 BASIC METABOLIC PNL TOTAL CA: CPT | Performed by: INTERNAL MEDICINE

## 2018-09-18 PROCEDURE — 20552 NJX 1/MLT TRIGGER POINT 1/2: CPT | Performed by: PSYCHIATRY & NEUROLOGY

## 2018-09-18 PROCEDURE — 99232 SBSQ HOSP IP/OBS MODERATE 35: CPT | Performed by: PHYSICIAN ASSISTANT

## 2018-09-18 PROCEDURE — 99232 SBSQ HOSP IP/OBS MODERATE 35: CPT | Performed by: PSYCHIATRY & NEUROLOGY

## 2018-09-18 RX ORDER — METHYLPREDNISOLONE ACETATE 80 MG/ML
40 INJECTION, SUSPENSION INTRA-ARTICULAR; INTRALESIONAL; INTRAMUSCULAR; SOFT TISSUE ONCE
Status: COMPLETED | OUTPATIENT
Start: 2018-09-18 | End: 2018-09-18

## 2018-09-18 RX ORDER — PANTOPRAZOLE SODIUM 40 MG/1
40 TABLET, DELAYED RELEASE ORAL
Status: DISCONTINUED | OUTPATIENT
Start: 2018-09-18 | End: 2018-09-19 | Stop reason: HOSPADM

## 2018-09-18 RX ORDER — DIAZEPAM 5 MG/ML
5 INJECTION, SOLUTION INTRAMUSCULAR; INTRAVENOUS EVERY 6 HOURS PRN
Status: DISCONTINUED | OUTPATIENT
Start: 2018-09-18 | End: 2018-09-19 | Stop reason: HOSPADM

## 2018-09-18 RX ORDER — LANOLIN ALCOHOL/MO/W.PET/CERES
6 CREAM (GRAM) TOPICAL
Status: DISCONTINUED | OUTPATIENT
Start: 2018-09-18 | End: 2018-09-19 | Stop reason: HOSPADM

## 2018-09-18 RX ORDER — BUPIVACAINE HYDROCHLORIDE 2.5 MG/ML
10 INJECTION, SOLUTION EPIDURAL; INFILTRATION; INTRACAUDAL ONCE
Status: COMPLETED | OUTPATIENT
Start: 2018-09-18 | End: 2018-09-18

## 2018-09-18 RX ORDER — DIHYDROERGOTAMINE MESYLATE 1 MG/ML
1 INJECTION, SOLUTION INTRAMUSCULAR; INTRAVENOUS; SUBCUTANEOUS 3 TIMES DAILY
Status: COMPLETED | OUTPATIENT
Start: 2018-09-18 | End: 2018-09-19

## 2018-09-18 RX ADMIN — BACLOFEN 10 MG: 10 TABLET ORAL at 15:55

## 2018-09-18 RX ADMIN — BACLOFEN 10 MG: 10 TABLET ORAL at 21:56

## 2018-09-18 RX ADMIN — MELATONIN TAB 3 MG 6 MG: 3 TAB at 22:23

## 2018-09-18 RX ADMIN — BUPIVACAINE HYDROCHLORIDE 10 ML: 2.5 INJECTION, SOLUTION EPIDURAL; INFILTRATION; INTRACAUDAL at 14:31

## 2018-09-18 RX ADMIN — METHYLPREDNISOLONE ACETATE 40 MG: 80 INJECTION, SUSPENSION INTRA-ARTICULAR; INTRALESIONAL; INTRAMUSCULAR; SOFT TISSUE at 14:31

## 2018-09-18 RX ADMIN — ALPRAZOLAM 0.25 MG: 0.25 TABLET ORAL at 15:55

## 2018-09-18 RX ADMIN — SODIUM CHLORIDE 500 MG: 0.9 INJECTION, SOLUTION INTRAVENOUS at 06:03

## 2018-09-18 RX ADMIN — BACLOFEN 10 MG: 10 TABLET ORAL at 08:27

## 2018-09-18 RX ADMIN — DIHYDROERGOTAMINE MESYLATE 1 MG: 1 INJECTION, SOLUTION INTRAMUSCULAR; INTRAVENOUS; SUBCUTANEOUS at 17:14

## 2018-09-18 RX ADMIN — DICLOFENAC 2 G: 10 GEL TOPICAL at 21:57

## 2018-09-18 RX ADMIN — DIAZEPAM 5 MG: 5 TABLET ORAL at 08:48

## 2018-09-18 RX ADMIN — METOCLOPRAMIDE 10 MG: 5 INJECTION, SOLUTION INTRAMUSCULAR; INTRAVENOUS at 21:56

## 2018-09-18 RX ADMIN — DICLOFENAC 2 G: 10 GEL TOPICAL at 17:15

## 2018-09-18 RX ADMIN — Medication 5 MG: at 13:53

## 2018-09-18 RX ADMIN — PANTOPRAZOLE SODIUM 40 MG: 40 TABLET, DELAYED RELEASE ORAL at 13:54

## 2018-09-18 RX ADMIN — LIDOCAINE 1 PATCH: 50 PATCH TOPICAL at 08:26

## 2018-09-18 RX ADMIN — Medication 5 MG: at 21:56

## 2018-09-18 RX ADMIN — METOCLOPRAMIDE 10 MG: 5 INJECTION, SOLUTION INTRAMUSCULAR; INTRAVENOUS at 16:49

## 2018-09-18 RX ADMIN — DICLOFENAC 2 G: 10 GEL TOPICAL at 13:54

## 2018-09-18 RX ADMIN — DIHYDROERGOTAMINE MESYLATE 1 MG: 1 INJECTION, SOLUTION INTRAMUSCULAR; INTRAVENOUS; SUBCUTANEOUS at 08:32

## 2018-09-18 RX ADMIN — DIHYDROERGOTAMINE MESYLATE 1 MG: 1 INJECTION, SOLUTION INTRAMUSCULAR; INTRAVENOUS; SUBCUTANEOUS at 22:24

## 2018-09-18 RX ADMIN — MAGNESIUM SULFATE HEPTAHYDRATE 2 G: 40 INJECTION, SOLUTION INTRAVENOUS at 11:17

## 2018-09-18 RX ADMIN — DICYCLOMINE HYDROCHLORIDE 20 MG: 10 CAPSULE ORAL at 21:56

## 2018-09-18 RX ADMIN — SODIUM CHLORIDE 500 MG: 0.9 INJECTION, SOLUTION INTRAVENOUS at 19:21

## 2018-09-18 RX ADMIN — METOCLOPRAMIDE 10 MG: 5 INJECTION, SOLUTION INTRAMUSCULAR; INTRAVENOUS at 08:27

## 2018-09-18 NOTE — PROGRESS NOTES
Progress Note - Fernando Johns 1980, 45 y o  female MRN: [id number]    Unit/Bed#: -01 Encounter: [id number]    Primary Care Provider: Selma Auguste MD   Date and time admitted to hospital: 2018  6:08 PM        * Headache   Assessment & Plan    · Persistent  · Neurology following, input is appreciated - suspect cervicogenic  · MRI brain, MRI C-Spine and MRV noted  · Opthalmic exam without papilledema   · Continue baclofen, solumedrol BID, depacon qHS and DHE q8h per neuro recommendations  - patient still without improvement - will change valium to IV and change Lidoderm patch to Voltaren gel  Neurology to consider pressure point injections  Tobacco abuse   Assessment & Plan    · Continue nicotine patch  · Cessation encouraged           VTE Pharmacologic Prophylaxis:   Pharmacologic: low risk  Mechanical VTE Prophylaxis in Place: Yes    Patient Centered Rounds: I have performed bedside rounds with nursing staff today  Discussions with Specialists or Other Care Team Provider:     Education and Discussions with Family / Patient: patient,  updated at bedside    Time Spent for Care: 30 minutes  More than 50% of total time spent on counseling and coordination of care as described above  Current Length of Stay: 1 day(s)    Current Patient Status: Inpatient   Certification Statement: The patient will continue to require additional inpatient hospital stay due to IV medications for headache treatment    Discharge Plan: await clinical course    Code Status: Level 1 - Full Code      Subjective:   Headache unchanged and still severe  Pain from base of skull and wraps around to eyes  Eyes feel like "there are razor blades" in them  Objective:     Vitals:   Temp (24hrs), Av 3 °F (36 8 °C), Min:98 °F (36 7 °C), Max:98 5 °F (36 9 °C)    HR:  [57-61] 61  Resp:  [18] 18  BP: (109-123)/(55-59) 109/59  SpO2:  [96 %-98 %] 98 %  Body mass index is 29 14 kg/m²       Input and Output Summary (last 24 hours): Intake/Output Summary (Last 24 hours) at 09/18/18 1241  Last data filed at 09/17/18 1922   Gross per 24 hour   Intake              480 ml   Output                0 ml   Net              480 ml       Physical Exam:     Physical Exam   Constitutional: She is oriented to person, place, and time  She appears well-developed and well-nourished  No distress  HENT:   Head: Normocephalic and atraumatic  Cardiovascular: Normal rate and regular rhythm  Exam reveals no friction rub  No murmur heard  Pulmonary/Chest: Effort normal and breath sounds normal  No respiratory distress  She has no wheezes  Abdominal: Soft  Bowel sounds are normal  She exhibits no distension  There is no tenderness  There is no rebound and no guarding  Musculoskeletal: She exhibits no edema  Neurological: She is alert and oriented to person, place, and time  No cranial nerve deficit  Skin: Skin is warm and dry  No rash noted  Psychiatric: She has a normal mood and affect  Nursing note and vitals reviewed  Additional Data:     Labs:      Results from last 7 days  Lab Units 09/18/18  0527   WBC Thousand/uL 21 28*   HEMOGLOBIN g/dL 13 7   HEMATOCRIT % 40 7   PLATELETS Thousands/uL 258   NEUTROS PCT % 93*   LYMPHS PCT % 4*   MONOS PCT % 2*   EOS PCT % 0       Results from last 7 days  Lab Units 09/18/18  0527 09/11/18  1837   SODIUM mmol/L 139 143   POTASSIUM mmol/L 4 2 3 6   CHLORIDE mmol/L 108 104   CO2 mmol/L 21 27   BUN mg/dL 7 7   CREATININE mg/dL 0 80 0 79   CALCIUM mg/dL 8 8 8 7   ALK PHOS U/L  --  82   ALT U/L  --  34   AST U/L  --  15                     * I Have Reviewed All Lab Data Listed Above  * Additional Pertinent Lab Tests Reviewed:  Darci 66 Admission Reviewed    Imaging:    Imaging Reports Reviewed Today Include: MRI, MRV  Imaging Personally Reviewed by Myself Includes:  none    Recent Cultures (last 7 days):           Last 24 Hours Medication List:     Current Facility-Administered Medications:  ALPRAZolam 0 25 mg Oral Daily PRN Irena Dietz PA-C    baclofen 10 mg Oral TID Jovana Mckeon MD    diazepam 5 mg Intravenous Q6H PRN Yanira Simpson PA-C    diclofenac sodium 2 g Topical 4x Daily Jenn Rodriges PA-C    dicyclomine 20 mg Oral HS Kylah MERINO SHABBIR Miller    dihydroergotamine 1 mg Intravenous TID Adrian Welch PA-C    loperamide 2 mg Oral TID PRN Richard Beebe PA-C    magnesium sulfate 2 g Intravenous After Breakfast Adrian Welch PA-C Last Rate: 2 g (09/17/18 1833)   methylPREDNISolone sodium succinate 500 mg Intravenous Q12H [address] Jovana Mckeon MD Last Rate: 500 mg (09/18/18 0603)   metoclopramide 10 mg Intravenous Q8H Madhav Craig MD    nicotine 1 patch Transdermal Daily Irena Dietz PA-C    pantoprazole 40 mg Oral Early Morning Yanira Simpson PA-C         Today, Patient Was Seen By: Yanira Simpson PA-C    ** Please Note: Dictation voice to text software may have been used in the creation of this document   **

## 2018-09-18 NOTE — PROCEDURES
Patient consented  PA and two nurses in the room during procedure as well as   Left lateral neck trigger point identified  Chloroprep utlized  Sterile approach  1 inch 23 gauge needle utilized to inject about 5 cc along multiple cervical paraspinal trigger points with repeated negative aspiration  Patient tolerated procedure well

## 2018-09-18 NOTE — CONSULTS
This 27-year-old white female has a history of headaches for 7 weeks  The headaches worsened 1 week ago and she was seen in the emergency room treated and released  Subsequently her condition worsened and she was readmitted  A workup so far has been unremarkable  She has not fully responded to medical treatment of the pain  She has complained of blurry vision  On examination visual acuity with correction is 20/40 both eyes  Pupils are equal round and reactive to light  There is no relative afferent pupil defect  Extraocular movements are intact  The confrontation visual fields are full  The external examination is unremarkable  Finger tensions are soft OU  The media is clear  The discs are pink and flat with 0 25 physiologic cupping O U , there is no papilledema on exam   The macula vessels and periphery are unremarkable  Impression: headaches  The eye exam is unremarkable  Perhaps at this point a course of steroids would be both diagnostic and therapeutic as well  Plan:  The patient is to follow up with me in my office after discharge for further evaluation

## 2018-09-18 NOTE — ASSESSMENT & PLAN NOTE
· Persistent  · Neurology following, input is appreciated - suspect cervicogenic  · MRI brain, MRI C-Spine and MRV noted  · Opthalmic exam without papilledema   · Continue baclofen, solumedrol BID, depacon qHS and DHE q8h per neuro recommendations  - patient still without improvement - will change valium to IV and change Lidoderm patch to Voltaren gel  Neurology to consider pressure point injections

## 2018-09-18 NOTE — PROGRESS NOTES
Progress Note - Neurology   Janice Rodriguez 45 y o  female [id number]  Unit/Bed#: /-01    Assessment:  Janice Rodriguez is a 45 y o  female with a history headaches who presented on 09/14 with a 7 week history of neck pain, associated photo and phonophobia  Neck pain had progressed from intermittent to continuous  Neck pain has since improved, though   Subsequently she developed worsening by a lateral frontal and temporal throbbing head pain with migrainous features  Plan:  Cervicogenic headache with associated migrainous features  Etiology of patient's headache likely related to her cervicalgia  Etiology of cervicalgia unknown  Tenderness appears to be myofascial   Does not appear to be related to disc bulge  Patient without radiculopathy  Patient denies symptoms suggestive of intracranial hypertension  Ophthalmology to complete fundoscopic exam    With migraine cocktail, patient's headache symptoms have not improved  Will speak with Dr Monisha Betancourt about consideration of trigger point injection  MRI brain revealing "prominent bilateral optic nerve sheath CSF spaces and tortuosity of bilateral optic nerves without definite other findings to suggest intracranial hypertension "  MRV head revealing 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 and associated prominent bilateral collateral draining veins, as well as 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  MRI C-spine revealing a disc bulge with superimposed left paracentral disc protrusion at the level of C5-6 causing mild spinal canal and mild-to-moderate left foraminal stenosis  Ophthalmology consulted for funduscopic exam   DHE 1 mg p o  q 8 hours preceded by Reglan  Patient has received total of 5 doses  Solu-Medrol 500 mg IV b i d  S/p 4 doses  Depacon 100 mg q h s   S/p 2 doses  Baclofen 10 mg t i d   Magnesium 2 g daily  Lidocaine patch and heat to neck  S/p Ketoralac 30mg x 7 doses  S/p one time dose of gabapentin 1800 mg last evening  Patient will need outpatient physical therapy for stretching exercises of the cervical neck  She should keep her existing outpatient new neurology appointment    Leukocytosis  Secondary to Solu-Medrol  Patient afebrile    Subjective: Today the patient complains of 10/10 by a lateral frontal/temporal setting headache and 10/10 left occiput neck tenderness  She states that since her MRI her symptoms seem to have worsened  She stated that she slept on and off for about 6 hours overnight "  When asked about any radicular symptoms in her left upper extremity, patient states that she has had problems with her left shoulder and occasional numbness, though did not describe true radiculopathy  Vital signs stable  Labs unremarkable except for an elevated white count which is secondary to Solu-Medrol  Patient afebrile  Past Medical History:   Diagnosis Date    Migraine     Ovarian cyst     left     Past Surgical History:   Procedure Laterality Date    HYSTERECTOMY      KNEE SURGERY       Family history:  History reviewed  No pertinent family history      Social History     Social History    Marital status: /Civil Union     Spouse name: N/A    Number of children: N/A    Years of education: N/A     Social History Main Topics    Smoking status: Current Every Day Smoker     Packs/day: 1 00    Smokeless tobacco: None    Alcohol use Yes      Comment: social    Drug use: No    Sexual activity: Not Asked     Other Topics Concern    None     Social History Narrative    None       Scheduled Meds:  Current Facility-Administered Medications:  ALPRAZolam 0 25 mg Oral Daily PRN US Phil PA-C    baclofen 10 mg Oral TID Rupinder De La Torre MD    diazepam 5 mg Oral Q6H PRN Ciera Singleton MD    dicyclomine 20 mg Oral HS Kylah Miller PA-C    dihydroergotamine 1 mg Intravenous TID Nicole Hernandez PA-C    lidocaine 1 patch Transdermal Daily Saeed Barrios PA-C    loperamide 2 mg Oral TID PRN hCula Newton PA-C    magnesium sulfate 2 g Intravenous After Breakfast Saeed Barrios PA-C Last Rate: 2 g (09/17/18 1833)   methylPREDNISolone sodium succinate 500 mg Intravenous Q12H [address] Berenice Antonio MD Last Rate: 500 mg (09/18/18 0603)   metoclopramide 10 mg Intravenous Q8H Juan Pablo Rodas MD    nicotine 1 patch Transdermal Daily US Phil PA-C      Continuous Infusions:   PRN Meds:   ALPRAZolam    diazepam    loperamide    ROS: A 12 point ROS was completed and other than the above mentioned complaints in the HPI and those listed below, all remaining systems were negative    -thudding bilateral headache  -left-sided neck pain  -blurry vision when looking at phone  -fatigue      Vitals: /59 (BP Location: Left arm)   Pulse 61   Temp 98 °F (36 7 °C) (Oral)   Resp 18   Ht 5' 4" (1 626 m)   Wt 77 kg (169 lb 12 1 oz)   SpO2 98%   BMI 29 14 kg/m²     Physical Exam:   Physical Exam   Constitutional: She is oriented to person, place, and time  She appears well-developed and well-nourished  No distress  Patient in bed with head of bed elevated   HENT:   Head: Normocephalic and atraumatic  Eyes: Conjunctivae and EOM are normal  Pupils are equal, round, and reactive to light  Right eye exhibits no discharge  Left eye exhibits no discharge  No scleral icterus  Neck: Normal range of motion  Neck supple  Cardiovascular: Normal rate and regular rhythm  Exam reveals no gallop and no friction rub  No murmur heard  Pulmonary/Chest: Effort normal and breath sounds normal  No stridor  No respiratory distress  She has no wheezes  She has no rales  She exhibits no tenderness  Musculoskeletal: Normal range of motion  She exhibits no edema, tenderness or deformity  Full neck range of motion with no meningeal signs   Lymphadenopathy:     She has no cervical adenopathy     Neurological: She is alert and oriented to person, place, and time  She has normal strength  Skin: Skin is warm and dry  No rash noted  No erythema  Psychiatric: She has a normal mood and affect  Her behavior is normal  Judgment and thought content normal      Neurologic Exam     Mental Status   Oriented to person, place, and time  The patient appears drowsy but is oriented x3  She is able to follow commands without difficulty  Speech is normal without dysarthria or aphasia  Comprehension is intact  Cranial Nerves     CN II   Visual acuity: normal    CN III, IV, VI   Pupils are equal, round, and reactive to light  Extraocular motions are normal    Nystagmus: none   Diplopia: none  Ophthalmoparesis: none  Conjugate gaze: present    CN V   Facial sensation intact  CN VII   Facial expression full, symmetric  CN VIII   Hearing: intact    CN XI   CN XI normal      CN XII   CN XII normal      Motor Exam   Muscle bulk: normal  Overall muscle tone: normal    Strength   Strength 5/5 throughout       Sensory Exam   Light touch normal        Labs:  Recent Results (from the past 24 hour(s))   CBC and differential    Collection Time: 09/18/18  5:27 AM   Result Value Ref Range    WBC 21 28 (H) 4 31 - 10 16 Thousand/uL    RBC 4 26 3 81 - 5 12 Million/uL    Hemoglobin 13 7 11 5 - 15 4 g/dL    Hematocrit 40 7 34 8 - 46 1 %    MCV 96 82 - 98 fL    MCH 32 2 26 8 - 34 3 pg    MCHC 33 7 31 4 - 37 4 g/dL    RDW 12 2 11 6 - 15 1 %    MPV 11 0 8 9 - 12 7 fL    Platelets 475 593 - 867 Thousands/uL    nRBC 0 /100 WBCs    Neutrophils Relative 93 (H) 43 - 75 %    Immat GRANS % 1 0 - 2 %    Lymphocytes Relative 4 (L) 14 - 44 %    Monocytes Relative 2 (L) 4 - 12 %    Eosinophils Relative 0 0 - 6 %    Basophils Relative 0 0 - 1 %    Neutrophils Absolute 19 81 (H) 1 85 - 7 62 Thousands/µL    Immature Grans Absolute 0 17 0 00 - 0 20 Thousand/uL    Lymphocytes Absolute 0 87 0 60 - 4 47 Thousands/µL    Monocytes Absolute 0 41 0 17 - 1 22 Thousand/µL    Eosinophils Absolute 0 00 0 00 - 0 61 Thousand/µL    Basophils Absolute 0 02 0 00 - 0 10 Thousands/µL   Basic metabolic panel    Collection Time: 09/18/18  5:27 AM   Result Value Ref Range    Sodium 139 136 - 145 mmol/L    Potassium 4 2 3 5 - 5 3 mmol/L    Chloride 108 100 - 108 mmol/L    CO2 21 21 - 32 mmol/L    ANION GAP 10 4 - 13 mmol/L    BUN 7 5 - 25 mg/dL    Creatinine 0 80 0 60 - 1 30 mg/dL    Glucose 132 65 - 140 mg/dL    Calcium 8 8 8 3 - 10 1 mg/dL    eGFR 94 ml/min/1 73sq m     Imaging: I have personally reviewed pertinent imaging and PACS reports       VTE Prophylaxis: Sequential compression device (Venodyne)

## 2018-09-19 VITALS
BODY MASS INDEX: 28.98 KG/M2 | RESPIRATION RATE: 16 BRPM | HEIGHT: 64 IN | HEART RATE: 60 BPM | TEMPERATURE: 98.1 F | DIASTOLIC BLOOD PRESSURE: 60 MMHG | OXYGEN SATURATION: 95 % | SYSTOLIC BLOOD PRESSURE: 128 MMHG | WEIGHT: 169.75 LBS

## 2018-09-19 PROCEDURE — 99239 HOSP IP/OBS DSCHRG MGMT >30: CPT | Performed by: PHYSICIAN ASSISTANT

## 2018-09-19 RX ORDER — ACETAMINOPHEN 500 MG
1000 TABLET ORAL EVERY 6 HOURS
Qty: 30 TABLET | Refills: 0
Start: 2018-09-19

## 2018-09-19 RX ORDER — ONDANSETRON 2 MG/ML
4 INJECTION INTRAMUSCULAR; INTRAVENOUS ONCE
Status: COMPLETED | OUTPATIENT
Start: 2018-09-19 | End: 2018-09-19

## 2018-09-19 RX ORDER — AMITRIPTYLINE HYDROCHLORIDE 10 MG/1
TABLET, FILM COATED ORAL
Qty: 60 TABLET | Refills: 0 | Status: SHIPPED | OUTPATIENT
Start: 2018-09-19 | End: 2018-10-15

## 2018-09-19 RX ORDER — IBUPROFEN 200 MG
400 TABLET ORAL EVERY 6 HOURS SCHEDULED
Refills: 0
Start: 2018-09-19 | End: 2020-05-18

## 2018-09-19 RX ORDER — ALPRAZOLAM 0.25 MG/1
0.5 TABLET ORAL 3 TIMES DAILY PRN
Qty: 30 TABLET | Refills: 0 | Status: SHIPPED | OUTPATIENT
Start: 2018-09-19 | End: 2020-05-18

## 2018-09-19 RX ADMIN — DIHYDROERGOTAMINE MESYLATE 1 MG: 1 INJECTION, SOLUTION INTRAMUSCULAR; INTRAVENOUS; SUBCUTANEOUS at 08:26

## 2018-09-19 RX ADMIN — METOCLOPRAMIDE 10 MG: 5 INJECTION, SOLUTION INTRAMUSCULAR; INTRAVENOUS at 08:18

## 2018-09-19 RX ADMIN — DICLOFENAC 2 G: 10 GEL TOPICAL at 08:27

## 2018-09-19 RX ADMIN — MAGNESIUM SULFATE HEPTAHYDRATE 2 G: 40 INJECTION, SOLUTION INTRAVENOUS at 08:28

## 2018-09-19 RX ADMIN — ONDANSETRON 4 MG: 2 INJECTION INTRAMUSCULAR; INTRAVENOUS at 06:08

## 2018-09-19 RX ADMIN — BACLOFEN 10 MG: 10 TABLET ORAL at 08:18

## 2018-09-19 RX ADMIN — PANTOPRAZOLE SODIUM 40 MG: 40 TABLET, DELAYED RELEASE ORAL at 06:09

## 2018-09-19 NOTE — DISCHARGE SUMMARY
Discharge- Roanne Severs 1980, 45 y o  female MRN: [id number]    Unit/Bed#: -01 Encounter: [id number]    Primary Care Provider: Jillian Duane, MD   Date and time admitted to hospital: 9/14/2018  6:08 PM        * Headache   Assessment & Plan    · Persistent  · Neurology following, input is appreciated - suspect cervicogenic, MRI of c-spine shows disc bulge but symptoms not correlating, not responding to migraine cocktail or trigger point injections  · MRI brain, MRI C-Spine and MRV noted  · Opthalmic exam without papilledema  · Patient appears in no distress and more comfortable today  · Discussed with Neurology, would discharge patient home so that she can get better sleep  Will start Elavil 10mg qhs x 1 week, then increase to 20 mg qhs  · Discussed using tylenol and ibuprofen for pain, stated that she can increase her xanax to 0 5mg TID if needed (Rx supplied), continue Voltaren cream  Outpatient PT  · Patient already has a Neurology appt scheduled with a physician in [place] on 9/28        Tobacco abuse   Assessment & Plan    · Continue nicotine patch  · Cessation encouraged             Discharging Physician / Practitioner: Diann Andujar PA-C  PCP: Jillian Duane, MD  Admission Date:   Admission Orders     Ordered        09/17/18 0900  Inpatient Admission  Once         09/14/18 2141  Place in Observation (expected length of stay for this patient is less than two midnights)  Once             Discharge Date: 09/19/18    Resolved Problems  Date Reviewed: 9/19/2018    None          Consultations During Hospital Stay:  · Dr Marquita Diaz  · Dr Moira Zhang    Procedures Performed:     · MRI cervical spine - Mild disc bulge with superimposed left paracentral disc protrusion at the level C5-6 causing mild spinal canal and mild to moderate left foraminal stenosis  · MRI brain - 1    Prominent bilateral optic nerve sheath CSF spaces and tortuosity of bilateral optic nerves without definite other findings to suggest intracranial hypertension  However, given 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 seen on same date MRV, early developing intracranial hypertension cannot entirely be excluded  2   Left maxillary sinus disease  · MRV brain - 1  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 and associated prominent bilateral collateral draining veins  Also 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  2  No filling defect to suggest dural venous sinus thrombosis    Significant Findings / Test Results:     · See above    Incidental Findings:   · none     Test Results Pending at Discharge (will require follow up):   · none     Outpatient Tests Requested:  · none    Complications:  none    Reason for Admission: intractable headache    Hospital Course:     Janice Rodriguez is a 45 y o  female patient who originally presented to the hospital on 9/14/2018 due to intractable headache  Patient was seen in consultation by Neurology  She underwent MRI the brain, cervical spine, and MRV of the brain  Patient noted to have a cervical disc bulge, but it was felt that her symptoms were not likely not originating from this  Patient was started on an IV migraine cocktail without much relief  She was also given trigger point injections again without much relief  On day of discharge patient does appear comfortable though she is still complaining of intractable headache  Discussed with Neurology, at this point we feel the patient would benefit more from discharge home so that she can get proper rest   Will discharge the patient home with Elavil as described above  Patient was also seen in consultation by Ophthalmology to rule out intracranial hypertension  The exam was unremarkable  Please see above list of diagnoses and related plan for additional information       Condition at Discharge: good     Discharge Day Visit / Exam:     Subjective: Headache unchanged  Vitals: Blood Pressure: 128/60 (09/19/18 0754)  Pulse: 60 (09/19/18 0754)  Temperature: 98 1 °F (36 7 °C) (09/19/18 0754)  Temp Source: Oral (09/19/18 0754)  Respirations: 16 (09/19/18 0754)  Height: 5' 4" (162 6 cm) (09/14/18 2233)  Weight - Scale: 77 kg (169 lb 12 1 oz) (09/14/18 2233)  SpO2: 95 % (09/19/18 0754)  Exam:   Physical Exam   Constitutional: She is oriented to person, place, and time  She appears well-developed and well-nourished  No distress  HENT:   Head: Normocephalic and atraumatic  Cardiovascular: Normal rate and regular rhythm  Exam reveals no friction rub  No murmur heard  Pulmonary/Chest: Effort normal and breath sounds normal  No respiratory distress  She has no wheezes  Abdominal: Soft  Bowel sounds are normal  She exhibits no distension  There is no tenderness  There is no rebound and no guarding  Musculoskeletal: She exhibits no edema  Neurological: She is alert and oriented to person, place, and time  No cranial nerve deficit  Skin: Skin is warm and dry  No rash noted  Psychiatric: She has a normal mood and affect  Nursing note and vitals reviewed  Discussion with Family:     Discharge instructions/Information to patient and family:   See after visit summary for information provided to patient and family  Provisions for Follow-Up Care:  See after visit summary for information related to follow-up care and any pertinent home health orders  Disposition:     Home    For Discharges to [facility]:   · Not Applicable to this Patient - Not Applicable to this Patient    Planned Readmission: none     Discharge Statement:  I spent 45 minutes discharging the patient  This time was spent on the day of discharge  I had direct contact with the patient on the day of discharge   Greater than 50% of the total time was spent examining patient, answering all patient questions, arranging and discussing plan of care with patient as well as directly providing post-discharge instructions  Additional time then spent on discharge activities  Discharge Medications:  See after visit summary for reconciled discharge medications provided to patient and family        ** Please Note: This note has been constructed using a voice recognition system **

## 2018-09-19 NOTE — ASSESSMENT & PLAN NOTE
· Persistent  · Neurology following, input is appreciated - suspect cervicogenic, MRI of c-spine shows disc bulge but symptoms not correlating, not responding to migraine cocktail or trigger point injections  · MRI brain, MRI C-Spine and MRV noted  · Opthalmic exam without papilledema  · Patient appears in no distress and more comfortable today  · Discussed with Neurology, would discharge patient home so that she can get better sleep   Will start Elavil 10mg qhs x 1 week, then increase to 20 mg qhs  · Discussed using tylenol and ibuprofen for pain, stated that she can increase her xanax to 0 5mg TID if needed (Rx supplied), continue Voltaren cream  Outpatient PT  · Patient already has a Neurology appt scheduled with a physician in [place] on 9/28

## 2018-10-04 ENCOUNTER — TELEPHONE (OUTPATIENT)
Dept: PAIN MEDICINE | Facility: MEDICAL CENTER | Age: 38
End: 2018-10-04

## 2018-10-04 NOTE — TELEPHONE ENCOUNTER
New pt being referred by Dr Matthieu Harrison (neurology) for neck pain, no prior pain mgt, has [facility], MRI done @     Pt is scheduled with Dr Mar Alonzo 10/15 and new pt packet mailed

## 2018-10-15 ENCOUNTER — CLINICAL SUPPORT (OUTPATIENT)
Dept: PAIN MEDICINE | Facility: CLINIC | Age: 38
End: 2018-10-15
Payer: COMMERCIAL

## 2018-10-15 VITALS
DIASTOLIC BLOOD PRESSURE: 70 MMHG | SYSTOLIC BLOOD PRESSURE: 102 MMHG | BODY MASS INDEX: 30.05 KG/M2 | WEIGHT: 176 LBS | HEART RATE: 88 BPM | HEIGHT: 64 IN

## 2018-10-15 DIAGNOSIS — R51.9 INTRACTABLE HEADACHE, UNSPECIFIED CHRONICITY PATTERN, UNSPECIFIED HEADACHE TYPE: ICD-10-CM

## 2018-10-15 DIAGNOSIS — M48.02 CERVICAL SPINAL STENOSIS: ICD-10-CM

## 2018-10-15 DIAGNOSIS — M54.12 CERVICAL RADICULOPATHY: Primary | ICD-10-CM

## 2018-10-15 DIAGNOSIS — M50.20 CERVICAL HERNIATED DISC: ICD-10-CM

## 2018-10-15 PROCEDURE — 99244 OFF/OP CNSLTJ NEW/EST MOD 40: CPT | Performed by: ANESTHESIOLOGY

## 2018-10-15 RX ORDER — OMEPRAZOLE 20 MG/1
CAPSULE, DELAYED RELEASE ORAL
Refills: 0 | COMMUNITY
Start: 2018-10-04 | End: 2020-05-18

## 2018-10-15 RX ORDER — OXYCODONE AND ACETAMINOPHEN 10; 325 MG/1; MG/1
1 TABLET ORAL
Refills: 0 | COMMUNITY
Start: 2018-10-06 | End: 2020-05-18

## 2018-10-15 RX ORDER — ONDANSETRON HYDROCHLORIDE 8 MG/1
TABLET, FILM COATED ORAL
Refills: 6 | COMMUNITY
Start: 2018-09-28 | End: 2020-05-18

## 2018-10-15 RX ORDER — AMITRIPTYLINE HYDROCHLORIDE 25 MG/1
TABLET, FILM COATED ORAL
Refills: 1 | COMMUNITY
Start: 2018-09-28 | End: 2020-05-18

## 2018-10-15 RX ORDER — GABAPENTIN 100 MG/1
1 CAPSULE ORAL DAILY
Refills: 4 | COMMUNITY
Start: 2018-10-04 | End: 2020-05-18

## 2018-10-15 RX ORDER — DIHYDROERGOTAMINE MESYLATE 4 MG/ML
SPRAY NASAL
Refills: 3 | COMMUNITY
Start: 2018-09-28 | End: 2020-05-18

## 2018-10-15 RX ORDER — MELOXICAM 15 MG/1
1 TABLET ORAL DAILY
Refills: 0 | COMMUNITY
Start: 2018-08-16 | End: 2020-05-18

## 2018-10-15 NOTE — PROGRESS NOTES
Assessment:  1  Cervical radiculopathy    2  Cervical spinal stenosis    3  Cervical herniated disc    4  Intractable headache, unspecified chronicity pattern, unspecified headache type        Plan:  27-year-old female presenting for initial consultation regarding a 1 year history of neck pain and cervical radiculopathy into the C6 distribution of the left upper extremity which has progressively worsened over the past 2 months  She denies any trauma or inciting event  The patient was recently admitted to the hospital for the symptoms as well as intractable headache with diplopia and tinnitus  The patient has an appointment with Neurology in 2 weeks regarding the headaches  The patient does have a disc herniation at C5-6 causing some central and left foraminal stenosis  She does have a positive Spurling's to the left  Although the patient does have a myofascial component to her neck pain, I do not believe that her headaches are completely tension-type headaches considering her auditory and visual symptoms  MRI of the brain did reveal tortuousity of bilateral optic nerves and MRV revealed a 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  1   I will prescribe [place] based physical therapy for the patient's neck pain and radicular symptoms  2  The patient will follow up with neurology as scheduled  3  The patient was already initiated on gabapentin 100 mg q h s     She was advised to increase to t i d   I did advise the patient that if she notices daytime drowsiness with the daytime doses of gabapentin she may take the medication all at bedtime  The patient did verbalize understanding  I also discussed with the patient that occasionally will titrate up to 1800 mg daily before reach optimal therapeutic effect    4   The patient may continue with meloxicam 15 mg daily p r n   5   The patient may continue with Percocet p r n  as prescribed, however I advised her to minimize this medication as much as possible to prevent tolerance and side effects as well as rebound headaches  6  I did offer the patient a cervical epidural steroid injection to reduce the inflammatory component of her pain, however she wished to try more conservative therapy 1st   The patient may call at any time to schedule this injection before her next office visit if needed          7  I will follow up the patient in 2 months    My impressions and treatment recommendations were discussed in detail with the patient who verbalized understanding and had no further questions  Discharge instructions were provided  I personally saw and examined the patient and I agree with the above discussed plan of care  No orders of the defined types were placed in this encounter  No orders of the defined types were placed in this encounter  History of Present Illness:    Mai Toro is a 45 y o  female presenting for initial consultation regarding a 1 year history of neck pain that radiates into the posterior aspect of the left upper extremity with associated numbness, paresthesias, and subjective weakness which has progressively worsened over the past 2 months  She denies any trauma or inciting event  The patient was recently admitted to the hospital for the symptoms as well as intractable headache with diplopia and tinnitus  The patient has an appointment with Neurology in 2 weeks regarding the headaches  She denies any nausea, incontinence, or balance issues  The patient does have a disc herniation at C5-6 causing some central and left foraminal stenosis  She does have a positive Spurling's to the left  MRI of the brain did reveal tortuousity of bilateral optic nerves and MRV revealed a 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  The patient has been initiated on gabapentin 100 mg q h s  and she was advised to increase to t i d  by Neurology  She will start taking it twice daily starting today    She is also taking meloxicam 15 mg daily p r n , amitriptyline 25 mg q h s , and Percocet 10/325 mg q 6 hours p r n  with minimal relief  She was also prescribed physical therapy which the patient has not yet started  The patient rates her pain an 8/10 on the pain is nearly constant  The pain does not follow any particular pattern throughout the day  The pain is described as burning, shooting, numbness, sharp, and pins and needles  There are no specific alleviating or aggravating factors and the pain is constant  I have personally reviewed and/or updated the patient's past medical history, past surgical history, family history, social history, current medications, allergies, and vital signs today  Other than as stated above, the patient denies any interval changes in medications, medical condition, mental condition, symptoms, or allergies since the last office visit  Review of Systems:    Review of Systems   Constitutional: Negative for fever and unexpected weight change  HENT: Negative for trouble swallowing  Eyes: Negative for visual disturbance  Respiratory: Negative for shortness of breath and wheezing  Cardiovascular: Negative for chest pain and palpitations  Gastrointestinal: Positive for constipation and diarrhea  Negative for nausea and vomiting  Endocrine: Negative for cold intolerance, heat intolerance and polydipsia  Genitourinary: Negative for difficulty urinating and frequency  Musculoskeletal: Negative for arthralgias, gait problem, joint swelling and myalgias  Skin: Negative for rash  Neurological: Positive for weakness  Negative for dizziness, seizures, syncope and headaches  Hematological: Does not bruise/bleed easily  Psychiatric/Behavioral: Negative for dysphoric mood  All other systems reviewed and are negative        Patient Active Problem List   Diagnosis    Headache    Tobacco abuse       Past Medical History:   Diagnosis Date    Migraine     Ovarian cyst left       Past Surgical History:   Procedure Laterality Date    HYSTERECTOMY      KNEE SURGERY         No family history on file  Social History     Occupational History    Not on file  Social History Main Topics    Smoking status: Current Every Day Smoker     Packs/day: 1 00    Smokeless tobacco: Not on file    Alcohol use Yes      Comment: social    Drug use: No    Sexual activity: Not on file       Current Outpatient Prescriptions on File Prior to Visit   Medication Sig    acetaminophen (TYLENOL) 500 mg tablet Take 2 tablets (1,000 mg total) by mouth every 6 (six) hours    ALPRAZolam (XANAX) 0 25 mg tablet Take 2 tablets (0 5 mg total) by mouth 3 (three) times a day as needed for anxiety for up to 10 days    amitriptyline (ELAVIL) 10 mg tablet Take 10mg at bedtime for 1 week, then increase to 20mg at bedtime    butalbital-acetaminophen-caffeine (FIORICET,ESGIC) -40 mg per tablet Take 1 tablet by mouth every 4 (four) hours as needed for headaches    diclofenac sodium (VOLTAREN) 1 % Apply 2 g topically 4 (four) times a day    dicyclomine (BENTYL) 20 mg tablet Take 1 tablet by mouth daily at bedtime    Ergocalciferol (ERGOCAL PO) Take 5,000 Units by mouth daily    ibuprofen (MOTRIN) 200 mg tablet Take 2 tablets (400 mg total) by mouth every 6 (six) hours    metoclopramide (REGLAN) 10 mg tablet Take 1 tablet (10 mg total) by mouth every 6 (six) hours as needed (Nausea vomiting)     No current facility-administered medications on file prior to visit  No Known Allergies    Physical Exam:    There were no vitals taken for this visit  Constitutional: normal, well developed, well nourished, alert, in no distress and non-toxic and no overt pain behavior    Eyes: anicteric  HEENT: grossly intact  Neck: supple, symmetric, trachea midline and no masses   Pulmonary:even and unlabored  Cardiovascular:No edema or pitting edema present  Skin:Normal without rashes or lesions and well hydrated  Psychiatric:Mood and affect appropriate  Neurologic:Cranial Nerves II-XII grossly intact  Musculoskeletal:normal gait  Bilateral cervical paraspinals and left trapezius tender to palpation and ropy in texture  Full range of motion of cervical spine in all planes  Bilateral biceps, triceps, brachioradialis, patellar, and Achilles reflexes were 2/4 and symmetrical   No clonus was noted bilaterally  Negative Chen's reflex bilaterally  Sensation intact to light touch in the C5 through T1 dermatomes bilaterally  Bilateral upper extremity strength 5/5 in all muscle groups  Positive Spurling's to the left and negative to the right      Imaging  Imaging reviewed

## 2018-11-29 ENCOUNTER — HOSPITAL ENCOUNTER (EMERGENCY)
Facility: HOSPITAL | Age: 38
Discharge: HOME | End: 2018-11-29
Attending: EMERGENCY MEDICINE | Admitting: EMERGENCY MEDICINE
Payer: COMMERCIAL

## 2018-11-29 VITALS
HEIGHT: 64 IN | BODY MASS INDEX: 30.21 KG/M2 | OXYGEN SATURATION: 100 % | HEART RATE: 80 BPM | SYSTOLIC BLOOD PRESSURE: 133 MMHG | DIASTOLIC BLOOD PRESSURE: 61 MMHG | TEMPERATURE: 97.9 F | RESPIRATION RATE: 18 BRPM

## 2018-11-29 DIAGNOSIS — M54.2 NECK PAIN ON LEFT SIDE: Primary | ICD-10-CM
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
## 2018-11-29 DIAGNOSIS — M62.838 TRAPEZIUS MUSCLE SPASM: ICD-10-CM

## 2018-11-29 DIAGNOSIS — M54.9 UPPER BACK PAIN ON LEFT SIDE: ICD-10-CM

## 2018-11-29 PROCEDURE — 99283 EMERGENCY DEPT VISIT LOW MDM: CPT

## 2018-11-29 PROCEDURE — 96372 THER/PROPH/DIAG INJ SC/IM: CPT

## 2018-11-29 RX ORDER — METHOCARBAMOL 500 MG/1
1000 TABLET, FILM COATED ORAL ONCE
Status: COMPLETED | OUTPATIENT
Start: 2018-11-29 | End: 2018-11-29

## 2018-11-29 RX ORDER — METHOCARBAMOL 750 MG/1
750 TABLET, FILM COATED ORAL 3 TIMES DAILY PRN
Qty: 15 TABLET | Refills: 0 | Status: SHIPPED | OUTPATIENT
Start: 2018-11-29 | End: 2020-05-18

## 2018-11-29 RX ORDER — KETOROLAC TROMETHAMINE 30 MG/ML
15 INJECTION, SOLUTION INTRAMUSCULAR; INTRAVENOUS ONCE
Status: COMPLETED | OUTPATIENT
Start: 2018-11-29 | End: 2018-11-29

## 2018-11-29 RX ADMIN — KETOROLAC TROMETHAMINE 15 MG: 30 INJECTION, SOLUTION INTRAMUSCULAR at 20:59

## 2018-11-29 RX ADMIN — METHOCARBAMOL TABLETS 1000 MG: 500 TABLET, COATED ORAL at 20:59

## 2018-11-30 NOTE — ED NOTES
Discharge instruction given to patient  No questions or concerns at this time  Patient able to ambulate out without difficulty        Bryant President, RN  11/29/18 2429

## 2018-11-30 NOTE — DISCHARGE INSTRUCTIONS
Acute Neck Pain   WHAT YOU NEED TO KNOW:   Acute neck pain starts suddenly, increases quickly, and goes away in a few days  The pain may come and go, or be worse with certain movements  The pain may be only in your neck, or it may move to your arms, back, or shoulders  You may also have pain that starts in another body area and moves to your neck  DISCHARGE INSTRUCTIONS:   Return to the emergency department if:   · You have an injury that causes neck pain and shooting pain down your arms or legs  · Your neck pain suddenly becomes severe  · You have neck pain along with numbness, tingling, or weakness in your arms or legs  · You have a stiff neck, a headache, and a fever  Contact your healthcare provider if:   · You have new or worsening symptoms  · Your symptoms continue even after treatment  · You have questions or concerns about your condition or care  Medicines:   · NSAIDs , such as ibuprofen, help decrease swelling, pain, and fever  This medicine is available without a doctor's order  Ask your healthcare provider which medicine to take and how often to take it  Follow directions  NSAIDs can cause stomach bleeding or kidney problems if not taken correctly  If you take blood thinner medicine, always ask if NSAIDs are safe for you  · Acetaminophen  helps decrease pain and fever  Ask your healthcare provider how much to take and how often to take it  Follow directions  Acetaminophen can cause liver damage if not taken correctly  · Steroid medicine  may be used to reduce inflammation  This can help relieve pain caused by swelling  · Take your medicine as directed  Contact your healthcare provider if you think your medicine is not helping or if you have side effects  Tell him or her if you are allergic to any medicine  Keep a list of the medicines, vitamins, and herbs you take  Include the amounts, and when and why you take them  Bring the list or the pill bottles to follow-up visits  Carry your medicine list with you in case of an emergency  Manage or prevent acute neck pain:   · Rest your neck as directed  Do not make sudden movements, such as turning your head quickly  Your healthcare provider may recommend you wear a cervical collar for a short time  The collar will prevent you from moving your head  This will give your neck time to heal if an injury is causing your neck pain  Ask your healthcare provider when you can return to sports or other normal daily activities  · Apply heat as directed  Heat helps relieve pain and swelling  Use a heat wrap, or soak a small towel in warm water  Wring out the extra water  Apply the heat wrap or towel for 20 minutes every hour, or as directed  · Apply ice as directed  Ice helps relieve pain and swelling, and can help prevent tissue damage  Use an ice pack, or put ice in a bag  Cover the ice pack or back with a towel before you apply it to your neck  Apply the ice pack or ice for 15 minutes every hour, or as directed  Your healthcare provider can tell you how often to apply ice  · Do neck exercises as directed  Neck exercises help strengthen the muscles and increase range of motion  Your healthcare provider will tell you which exercises are right for you  He may give you instructions, or he may recommend that you work with a physical therapist  Your healthcare provider or therapist can make sure you are doing the exercises correctly  · Maintain good posture  Try to keep your head and shoulders lifted when you sit  If you work in front of a computer, make sure the monitor is at the right level  You should not need to look up down to see the screen  You should also not have to lean forward to be able to read what is on the screen  Make sure your keyboard, mouse, and other computer items are placed where you do not have to extend your shoulder to reach them  Get up often if you work in front of a computer or sit for long periods of time  Stretch or walk around to keep your neck muscles loose  Follow up with your healthcare provider as directed: Your healthcare provider may refer you to a specialist if your pain does not get better with treatment  Write down your questions so you remember to ask them during your visits  © 2017 [employer] [employer] Information is for End User's use only and may not be sold, redistributed or otherwise used for commercial purposes  All illustrations and images included in CareNotes® are the copyrighted property of [employer]  or [employer]  The above information is an  only  It is not intended as medical advice for individual conditions or treatments  Talk to your doctor, nurse or pharmacist before following any medical regimen to see if it is safe and effective for you  Muscle Spasm   WHAT YOU NEED TO KNOW:   A muscle spasm is a sudden contraction of any muscle or group of muscles  A muscle cramp is a painful muscle spasm  Muscle cramps commonly occur after intense exercise or during pregnancy  They may also be caused by certain medications, dehydration, low calcium or magnesium levels, or another medical condition  DISCHARGE INSTRUCTIONS:   Medicines: You may need the following:  · NSAIDs  help decrease swelling and pain or fever  This medicine is available with or without a doctor's order  NSAIDs can cause stomach bleeding or kidney problems in certain people  If you take blood thinner medicine, always ask your healthcare provider if NSAIDs are safe for you  Always read the medicine label and follow directions  · Take your medicine as directed  Contact your healthcare provider if you think your medicine is not helping or if you have side effects  Tell him of her if you are allergic to any medicine  Keep a list of the medicines, vitamins, and herbs you take  Include the amounts, and when and why you take them  Bring the list or the pill bottles to follow-up visits  Carry your medicine list with you in case of an emergency  Follow up with your healthcare provider as directed: You may need other tests or treatment  You may also be referred to a physical therapist or other specialist  Write down your questions so you remember to ask them during your visits  Self-care:   · Stretch  your muscle to help relieve the cramp  It may be helpful to keep your muscle in the stretched position until the cramp is gone  · Apply heat  to help decrease pain and muscle spasms  Apply heat on the area for 20 to 30 minutes every 2 hours for as many days as directed  · Apply ice  to help decrease swelling and pain  Ice may also help prevent tissue damage  Use an ice pack, or put crushed ice in a plastic bag  Cover it with a towel and place it on your muscle for 15 to 20 minutes every hour or as directed  · Drink more liquids  to help prevent muscle cramps caused by dehydration  Sports drinks may help replace electrolytes you lose through sweat during exercise  Ask your healthcare provider how much liquid to drink each day and which liquids are best for you  · Eat healthy foods , such as fruits, vegetables, whole grains, low-fat dairy products, and lean proteins (meat, beans, and fish)  If you are pregnant, ask your healthcare provider about foods that are high in magnesium and sodium  They may help to relieve cramps during pregnancy  · Massage your muscle  to help relieve the cramp  · Take frequent deep breaths  until the cramp feels better  Lie down while you take the deep breaths so you do not get dizzy or lightheaded  Contact your healthcare provider if:   · You have signs of dehydration, such as a headache, dark yellow urine, dry eyes or mouth, or a fast heartbeat  · You have questions or concerns about your condition or care  Return to the emergency department if:   · You have warmth, swelling, or redness in the cramping muscle       · You have frequent or unrelieved muscle cramps in several different muscles  · You have muscle cramps with numbness, tingling, and burning in your hands and feet  © 2017 [employer] Information is for End User's use only and may not be sold, redistributed or otherwise used for commercial purposes  All illustrations and images included in CareNotes® are the copyrighted property of A [employer] M [employer]  or [employer]  The above information is an  only  It is not intended as medical advice for individual conditions or treatments  Talk to your doctor, nurse or pharmacist before following any medical regimen to see if it is safe and effective for you  Motor Vehicle Accident   WHAT YOU NEED TO KNOW:   A motor vehicle accident (MVA) can cause injury from the impact or from being thrown around inside the car  You may have a bruise on your abdomen, chest, or neck from the seatbelt  You may also have pain in your face, neck, or back  You may have pain in your knee, hip, or thigh if your body hits the dash or the steering wheel  Muscle pain is commonly worse 1 to 2 days after an MVA  DISCHARGE INSTRUCTIONS:   Call 911 if:   · You have new or worsening chest pain or shortness of breath  Return to the emergency department if:   · You have new or worsening pain in your abdomen  · You have nausea and vomiting that does not get better  · You have a severe headache  · You have weakness, tingling, or numbness in your arms or legs  · You have new or worsening pain that makes it hard for you to move  Contact your healthcare provider if:   · You have pain that develops 2 to 3 days after the MVA  · You have questions or concerns about your condition or care  Medicines:   · Pain medicine: You may be given medicine to take away or decrease pain  Do not wait until the pain is severe before you take your medicine  · NSAIDs , such as ibuprofen, help decrease swelling, pain, and fever  This medicine is available with or without a doctor's order  NSAIDs can cause stomach bleeding or kidney problems in certain people  If you take blood thinner medicine, always ask if NSAIDs are safe for you  Always read the medicine label and follow directions  Do not give these medicines to children under 10months of age without direction from your child's healthcare provider  · Take your medicine as directed  Contact your healthcare provider if you think your medicine is not helping or if you have side effects  Tell him of her if you are allergic to any medicine  Keep a list of the medicines, vitamins, and herbs you take  Include the amounts, and when and why you take them  Bring the list or the pill bottles to follow-up visits  Carry your medicine list with you in case of an emergency  Follow up with your healthcare provider as directed:  Write down your questions so you remember to ask them during your visits  Safety tips:   · Always wear your seatbelt  This will help reduce serious injury from an MVA  · Use child safety seats  Your child needs to ride in a child safety seat made for his age, height, and weight  Ask your healthcare provider for more information about child safety seats  · Decrease speed  Drive the speed limit to reduce your risk for an MVA  · Do not drive if you are tired  You will react more slowly when you are tired  The slowed reaction time will increase your risk for an MVA  · Do not talk or text on your cell phone while you drive  You cannot respond fast enough in an emergency if you are distracted by texts or conversations  · Do not drink and drive  Use a designated   Call a taxi or get a ride home with someone if you have been drinking  Do not let your friends drive if they have been drinking alcohol  · Do not use illegal drugs and drive  You may be more tired or take risks that you normally would not take   Do not drive after you take prescription medicines that make you sleepy  Self-care:   · Use ice and heat  Ice helps decrease swelling and pain  Ice may also help prevent tissue damage  Use an ice pack, or put crushed ice in a plastic bag  Cover it with a towel and apply to your injured area for 15 to 20 minutes every hour, or as directed  After 2 days, use a heating pad on your injured area  Use heat as directed  · Gently stretch  Use gentle exercises to stretch your muscles after an MVA  Ask your healthcare provider for exercises you can do  © 2017 [address] [facility] Information is for End User's use only and may not be sold, redistributed or otherwise used for commercial purposes  All illustrations and images included in CareNotes® are the copyrighted property of [employer]  or [employer]  The above information is an  only  It is not intended as medical advice for individual conditions or treatments  Talk to your doctor, nurse or pharmacist before following any medical regimen to see if it is safe and effective for you
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
## 2020-05-13 ENCOUNTER — TELEPHONE (OUTPATIENT)
Dept: GASTROENTEROLOGY | Facility: AMBULARY SURGERY CENTER | Age: 40
End: 2020-05-13

## 2020-05-18 ENCOUNTER — TELEMEDICINE (OUTPATIENT)
Dept: GASTROENTEROLOGY | Facility: MEDICAL CENTER | Age: 40
End: 2020-05-18
Payer: COMMERCIAL

## 2020-05-18 DIAGNOSIS — R10.84 GENERALIZED ABDOMINAL PAIN: ICD-10-CM

## 2020-05-18 DIAGNOSIS — K52.9 CHRONIC DIARRHEA: Primary | ICD-10-CM

## 2020-05-18 PROCEDURE — 99204 OFFICE O/P NEW MOD 45 MIN: CPT | Performed by: INTERNAL MEDICINE

## 2020-05-18 RX ORDER — ESCITALOPRAM OXALATE 10 MG/1
10 TABLET ORAL DAILY
COMMUNITY

## 2020-05-27 ENCOUNTER — TRANSCRIBE ORDERS (OUTPATIENT)
Dept: LAB | Facility: CLINIC | Age: 40
End: 2020-05-27

## 2020-05-27 ENCOUNTER — LAB (OUTPATIENT)
Dept: LAB | Facility: CLINIC | Age: 40
End: 2020-05-27
Payer: COMMERCIAL

## 2020-05-27 DIAGNOSIS — K52.9 CHRONIC DIARRHEA: ICD-10-CM

## 2020-05-27 LAB
BASOPHILS # BLD AUTO: 0.06 THOUSANDS/ΜL (ref 0–0.1)
BASOPHILS NFR BLD AUTO: 1 % (ref 0–1)
EOSINOPHIL # BLD AUTO: 0.27 THOUSAND/ΜL (ref 0–0.61)
EOSINOPHIL NFR BLD AUTO: 3 % (ref 0–6)
ERYTHROCYTE [DISTWIDTH] IN BLOOD BY AUTOMATED COUNT: 12.2 % (ref 11.6–15.1)
HCT VFR BLD AUTO: 38.8 % (ref 34.8–46.1)
HGB BLD-MCNC: 13.2 G/DL (ref 11.5–15.4)
IMM GRANULOCYTES # BLD AUTO: 0.03 THOUSAND/UL (ref 0–0.2)
IMM GRANULOCYTES NFR BLD AUTO: 0 % (ref 0–2)
LYMPHOCYTES # BLD AUTO: 2.05 THOUSANDS/ΜL (ref 0.6–4.47)
LYMPHOCYTES NFR BLD AUTO: 21 % (ref 14–44)
MCH RBC QN AUTO: 32.6 PG (ref 26.8–34.3)
MCHC RBC AUTO-ENTMCNC: 34 G/DL (ref 31.4–37.4)
MCV RBC AUTO: 96 FL (ref 82–98)
MONOCYTES # BLD AUTO: 0.66 THOUSAND/ΜL (ref 0.17–1.22)
MONOCYTES NFR BLD AUTO: 7 % (ref 4–12)
NEUTROPHILS # BLD AUTO: 6.71 THOUSANDS/ΜL (ref 1.85–7.62)
NEUTS SEG NFR BLD AUTO: 68 % (ref 43–75)
NRBC BLD AUTO-RTO: 0 /100 WBCS
PLATELET # BLD AUTO: 307 THOUSANDS/UL (ref 149–390)
PMV BLD AUTO: 10.4 FL (ref 8.9–12.7)
RBC # BLD AUTO: 4.05 MILLION/UL (ref 3.81–5.12)
TSH SERPL DL<=0.05 MIU/L-ACNC: 0.94 UIU/ML (ref 0.36–3.74)
WBC # BLD AUTO: 9.78 THOUSAND/UL (ref 4.31–10.16)

## 2020-05-27 PROCEDURE — 82784 ASSAY IGA/IGD/IGG/IGM EACH: CPT

## 2020-05-27 PROCEDURE — 85025 COMPLETE CBC W/AUTO DIFF WBC: CPT

## 2020-05-27 PROCEDURE — 83516 IMMUNOASSAY NONANTIBODY: CPT

## 2020-05-27 PROCEDURE — 84443 ASSAY THYROID STIM HORMONE: CPT

## 2020-05-27 PROCEDURE — 36415 COLL VENOUS BLD VENIPUNCTURE: CPT

## 2020-05-28 LAB — IGA SERPL-MCNC: 171 MG/DL (ref 70–400)

## 2020-05-29 ENCOUNTER — TELEPHONE (OUTPATIENT)
Dept: GASTROENTEROLOGY | Facility: AMBULARY SURGERY CENTER | Age: 40
End: 2020-05-29

## 2020-05-29 ENCOUNTER — LAB (OUTPATIENT)
Dept: LAB | Facility: CLINIC | Age: 40
End: 2020-05-29
Payer: COMMERCIAL

## 2020-05-29 DIAGNOSIS — R10.84 GENERALIZED ABDOMINAL PAIN: ICD-10-CM

## 2020-05-29 DIAGNOSIS — K52.9 CHRONIC DIARRHEA: ICD-10-CM

## 2020-05-29 DIAGNOSIS — K52.9 CHRONIC DIARRHEA: Primary | ICD-10-CM

## 2020-05-29 LAB — TTG IGA SER-ACNC: <2 U/ML (ref 0–3)

## 2020-05-29 PROCEDURE — 87329 GIARDIA AG IA: CPT

## 2020-05-29 PROCEDURE — 83993 ASSAY FOR CALPROTECTIN FECAL: CPT

## 2020-05-29 RX ORDER — DICYCLOMINE HCL 20 MG
20 TABLET ORAL 4 TIMES DAILY PRN
Qty: 120 TABLET | Refills: 3 | Status: CANCELLED | OUTPATIENT
Start: 2020-05-29

## 2020-06-01 ENCOUNTER — TELEPHONE (OUTPATIENT)
Dept: GASTROENTEROLOGY | Facility: MEDICAL CENTER | Age: 40
End: 2020-06-01

## 2020-06-02 DIAGNOSIS — R10.9 ABDOMINAL PAIN, UNSPECIFIED ABDOMINAL LOCATION: Primary | ICD-10-CM

## 2020-06-02 LAB
CALPROTECTIN STL-MCNT: <16 UG/G (ref 0–120)
G LAMBLIA AG STL QL IA: NEGATIVE

## 2020-06-02 RX ORDER — DICYCLOMINE HCL 20 MG
20 TABLET ORAL EVERY 6 HOURS
Qty: 120 TABLET | Refills: 5 | Status: SHIPPED | OUTPATIENT
Start: 2020-06-02 | End: 2021-04-19

## 2020-06-03 ENCOUNTER — HOSPITAL ENCOUNTER (EMERGENCY)
Facility: HOSPITAL | Age: 40
Discharge: HOME | End: 2020-06-03
Attending: EMERGENCY MEDICINE | Admitting: EMERGENCY MEDICINE
Payer: COMMERCIAL

## 2020-06-03 ENCOUNTER — APPOINTMENT (EMERGENCY)
Dept: CT IMAGING | Facility: HOSPITAL | Age: 40
End: 2020-06-03
Payer: COMMERCIAL

## 2020-06-03 VITALS
OXYGEN SATURATION: 99 % | BODY MASS INDEX: 30.04 KG/M2 | WEIGHT: 175 LBS | RESPIRATION RATE: 16 BRPM | DIASTOLIC BLOOD PRESSURE: 70 MMHG | TEMPERATURE: 98.4 F | SYSTOLIC BLOOD PRESSURE: 143 MMHG | HEART RATE: 63 BPM

## 2020-06-03 DIAGNOSIS — K52.9 CHRONIC DIARRHEA: Primary | ICD-10-CM

## 2020-06-03 DIAGNOSIS — K21.9 GERD (GASTROESOPHAGEAL REFLUX DISEASE): ICD-10-CM

## 2020-06-03 DIAGNOSIS — R10.9 ABDOMINAL CRAMPING: ICD-10-CM

## 2020-06-03 LAB
ALBUMIN SERPL BCP-MCNC: 4.2 G/DL (ref 3.5–5)
ALP SERPL-CCNC: 83 U/L (ref 46–116)
ALT SERPL W P-5'-P-CCNC: 74 U/L (ref 12–78)
ANION GAP SERPL CALCULATED.3IONS-SCNC: 7 MMOL/L (ref 4–13)
AST SERPL W P-5'-P-CCNC: 32 U/L (ref 5–45)
BASOPHILS # BLD AUTO: 0.09 THOUSANDS/ΜL (ref 0–0.1)
BASOPHILS NFR BLD AUTO: 1 % (ref 0–1)
BILIRUB SERPL-MCNC: 0.43 MG/DL (ref 0.2–1)
BUN SERPL-MCNC: 7 MG/DL (ref 5–25)
CALCIUM SERPL-MCNC: 8.9 MG/DL (ref 8.3–10.1)
CHLORIDE SERPL-SCNC: 103 MMOL/L (ref 100–108)
CO2 SERPL-SCNC: 27 MMOL/L (ref 21–32)
CREAT SERPL-MCNC: 0.81 MG/DL (ref 0.6–1.3)
EOSINOPHIL # BLD AUTO: 0.21 THOUSAND/ΜL (ref 0–0.61)
EOSINOPHIL NFR BLD AUTO: 2 % (ref 0–6)
ERYTHROCYTE [DISTWIDTH] IN BLOOD BY AUTOMATED COUNT: 12.3 % (ref 11.6–15.1)
GFR SERPL CREATININE-BSD FRML MDRD: 92 ML/MIN/1.73SQ M
GLUCOSE SERPL-MCNC: 85 MG/DL (ref 65–140)
HCT VFR BLD AUTO: 42 % (ref 34.8–46.1)
HGB BLD-MCNC: 14.2 G/DL (ref 11.5–15.4)
IMM GRANULOCYTES # BLD AUTO: 0.05 THOUSAND/UL (ref 0–0.2)
IMM GRANULOCYTES NFR BLD AUTO: 1 % (ref 0–2)
LIPASE SERPL-CCNC: 158 U/L (ref 73–393)
LYMPHOCYTES # BLD AUTO: 2.6 THOUSANDS/ΜL (ref 0.6–4.47)
LYMPHOCYTES NFR BLD AUTO: 24 % (ref 14–44)
MCH RBC QN AUTO: 32.4 PG (ref 26.8–34.3)
MCHC RBC AUTO-ENTMCNC: 33.8 G/DL (ref 31.4–37.4)
MCV RBC AUTO: 96 FL (ref 82–98)
MONOCYTES # BLD AUTO: 0.6 THOUSAND/ΜL (ref 0.17–1.22)
MONOCYTES NFR BLD AUTO: 6 % (ref 4–12)
NEUTROPHILS # BLD AUTO: 7.42 THOUSANDS/ΜL (ref 1.85–7.62)
NEUTS SEG NFR BLD AUTO: 66 % (ref 43–75)
NRBC BLD AUTO-RTO: 0 /100 WBCS
PLATELET # BLD AUTO: 303 THOUSANDS/UL (ref 149–390)
PMV BLD AUTO: 9.9 FL (ref 8.9–12.7)
POTASSIUM SERPL-SCNC: 3.6 MMOL/L (ref 3.5–5.3)
PROT SERPL-MCNC: 8 G/DL (ref 6.4–8.2)
RBC # BLD AUTO: 4.38 MILLION/UL (ref 3.81–5.12)
SODIUM SERPL-SCNC: 137 MMOL/L (ref 136–145)
WBC # BLD AUTO: 10.97 THOUSAND/UL (ref 4.31–10.16)

## 2020-06-03 PROCEDURE — 99284 EMERGENCY DEPT VISIT MOD MDM: CPT

## 2020-06-03 PROCEDURE — 74177 CT ABD & PELVIS W/CONTRAST: CPT

## 2020-06-03 PROCEDURE — 96374 THER/PROPH/DIAG INJ IV PUSH: CPT

## 2020-06-03 PROCEDURE — 36415 COLL VENOUS BLD VENIPUNCTURE: CPT | Performed by: PHYSICIAN ASSISTANT

## 2020-06-03 PROCEDURE — 83690 ASSAY OF LIPASE: CPT | Performed by: PHYSICIAN ASSISTANT

## 2020-06-03 PROCEDURE — 96375 TX/PRO/DX INJ NEW DRUG ADDON: CPT

## 2020-06-03 PROCEDURE — 99284 EMERGENCY DEPT VISIT MOD MDM: CPT | Performed by: PHYSICIAN ASSISTANT

## 2020-06-03 PROCEDURE — 96361 HYDRATE IV INFUSION ADD-ON: CPT

## 2020-06-03 PROCEDURE — 80053 COMPREHEN METABOLIC PANEL: CPT | Performed by: PHYSICIAN ASSISTANT

## 2020-06-03 PROCEDURE — 85025 COMPLETE CBC W/AUTO DIFF WBC: CPT | Performed by: PHYSICIAN ASSISTANT

## 2020-06-03 RX ORDER — CIPROFLOXACIN 250 MG/1
TABLET, FILM COATED ORAL
COMMUNITY

## 2020-06-03 RX ORDER — METRONIDAZOLE 500 MG/1
TABLET ORAL
COMMUNITY

## 2020-06-03 RX ORDER — IBUPROFEN 200 MG
600 TABLET ORAL EVERY 6 HOURS PRN
COMMUNITY
Start: 2019-02-07

## 2020-06-03 RX ORDER — KETOROLAC TROMETHAMINE 30 MG/ML
15 INJECTION, SOLUTION INTRAMUSCULAR; INTRAVENOUS ONCE
Status: COMPLETED | OUTPATIENT
Start: 2020-06-03 | End: 2020-06-03

## 2020-06-03 RX ORDER — PAROXETINE 10 MG/1
TABLET, FILM COATED ORAL
COMMUNITY

## 2020-06-03 RX ORDER — FLUCONAZOLE 200 MG/1
TABLET ORAL
COMMUNITY

## 2020-06-03 RX ORDER — PANTOPRAZOLE SODIUM 20 MG/1
20 TABLET, DELAYED RELEASE ORAL DAILY
Qty: 20 TABLET | Refills: 0 | Status: SHIPPED | OUTPATIENT
Start: 2020-06-03

## 2020-06-03 RX ORDER — ONDANSETRON 4 MG/1
TABLET, FILM COATED ORAL
COMMUNITY

## 2020-06-03 RX ORDER — ALPRAZOLAM 0.25 MG/1
TABLET ORAL
COMMUNITY
Start: 2018-09-19

## 2020-06-03 RX ORDER — NAPROXEN SODIUM 550 MG/1
550 TABLET ORAL
COMMUNITY

## 2020-06-03 RX ORDER — OXYCODONE HYDROCHLORIDE AND ACETAMINOPHEN 5; 325 MG/1; MG/1
TABLET ORAL
COMMUNITY

## 2020-06-03 RX ORDER — TRAMADOL HYDROCHLORIDE 50 MG/1
50 TABLET ORAL EVERY 6 HOURS PRN
COMMUNITY

## 2020-06-03 RX ORDER — FLUCONAZOLE 150 MG/1
TABLET ORAL
COMMUNITY

## 2020-06-03 RX ADMIN — SODIUM CHLORIDE 1000 ML: 0.9 INJECTION, SOLUTION INTRAVENOUS at 16:00

## 2020-06-03 RX ADMIN — FAMOTIDINE 20 MG: 10 INJECTION, SOLUTION INTRAVENOUS at 16:18

## 2020-06-03 RX ADMIN — KETOROLAC TROMETHAMINE 15 MG: 30 INJECTION, SOLUTION INTRAMUSCULAR at 16:18

## 2020-06-03 RX ADMIN — IOHEXOL 100 ML: 350 INJECTION, SOLUTION INTRAVENOUS at 16:43

## 2020-06-04 ENCOUNTER — TELEPHONE (OUTPATIENT)
Dept: GASTROENTEROLOGY | Facility: MEDICAL CENTER | Age: 40
End: 2020-06-04

## 2020-12-03 ENCOUNTER — APPOINTMENT (EMERGENCY)
Dept: RADIOLOGY | Facility: HOSPITAL | Age: 40
End: 2020-12-03
Payer: COMMERCIAL

## 2020-12-03 ENCOUNTER — HOSPITAL ENCOUNTER (EMERGENCY)
Facility: HOSPITAL | Age: 40
Discharge: HOME | End: 2020-12-03
Attending: EMERGENCY MEDICINE
Payer: COMMERCIAL

## 2020-12-03 VITALS
RESPIRATION RATE: 18 BRPM | SYSTOLIC BLOOD PRESSURE: 137 MMHG | DIASTOLIC BLOOD PRESSURE: 68 MMHG | HEART RATE: 56 BPM | OXYGEN SATURATION: 95 % | TEMPERATURE: 98.3 F

## 2020-12-03 DIAGNOSIS — J06.9 UPPER RESPIRATORY INFECTION: Primary | ICD-10-CM

## 2020-12-03 LAB
ALBUMIN SERPL BCP-MCNC: 3.8 G/DL (ref 3.5–5)
ALP SERPL-CCNC: 96 U/L (ref 46–116)
ALT SERPL W P-5'-P-CCNC: 43 U/L (ref 12–78)
ANION GAP SERPL CALCULATED.3IONS-SCNC: 9 MMOL/L (ref 4–13)
AST SERPL W P-5'-P-CCNC: 13 U/L (ref 5–45)
BASOPHILS # BLD AUTO: 0.06 THOUSANDS/ΜL (ref 0–0.1)
BASOPHILS NFR BLD AUTO: 0 % (ref 0–1)
BILIRUB SERPL-MCNC: 0.25 MG/DL (ref 0.2–1)
BILIRUB UR QL STRIP: NEGATIVE
BUN SERPL-MCNC: 10 MG/DL (ref 5–25)
CALCIUM SERPL-MCNC: 8.9 MG/DL (ref 8.3–10.1)
CHLORIDE SERPL-SCNC: 105 MMOL/L (ref 100–108)
CLARITY UR: CLEAR
CO2 SERPL-SCNC: 26 MMOL/L (ref 21–32)
COLOR UR: YELLOW
CREAT SERPL-MCNC: 0.82 MG/DL (ref 0.6–1.3)
EOSINOPHIL # BLD AUTO: 0.02 THOUSAND/ΜL (ref 0–0.61)
EOSINOPHIL NFR BLD AUTO: 0 % (ref 0–6)
ERYTHROCYTE [DISTWIDTH] IN BLOOD BY AUTOMATED COUNT: 12.3 % (ref 11.6–15.1)
GFR SERPL CREATININE-BSD FRML MDRD: 90 ML/MIN/1.73SQ M
GLUCOSE SERPL-MCNC: 109 MG/DL (ref 65–140)
GLUCOSE UR STRIP-MCNC: NEGATIVE MG/DL
HCT VFR BLD AUTO: 41.8 % (ref 34.8–46.1)
HGB BLD-MCNC: 13.9 G/DL (ref 11.5–15.4)
HGB UR QL STRIP.AUTO: NEGATIVE
IMM GRANULOCYTES # BLD AUTO: 0.13 THOUSAND/UL (ref 0–0.2)
IMM GRANULOCYTES NFR BLD AUTO: 1 % (ref 0–2)
KETONES UR STRIP-MCNC: NEGATIVE MG/DL
LEUKOCYTE ESTERASE UR QL STRIP: ABNORMAL
LYMPHOCYTES # BLD AUTO: 2.47 THOUSANDS/ΜL (ref 0.6–4.47)
LYMPHOCYTES NFR BLD AUTO: 18 % (ref 14–44)
MCH RBC QN AUTO: 32.1 PG (ref 26.8–34.3)
MCHC RBC AUTO-ENTMCNC: 33.3 G/DL (ref 31.4–37.4)
MCV RBC AUTO: 97 FL (ref 82–98)
MONOCYTES # BLD AUTO: 1.19 THOUSAND/ΜL (ref 0.17–1.22)
MONOCYTES NFR BLD AUTO: 9 % (ref 4–12)
NEUTROPHILS # BLD AUTO: 10.18 THOUSANDS/ΜL (ref 1.85–7.62)
NEUTS SEG NFR BLD AUTO: 72 % (ref 43–75)
NITRITE UR QL STRIP: NEGATIVE
NRBC BLD AUTO-RTO: 0 /100 WBCS
PH UR STRIP.AUTO: 5.5 [PH] (ref 4.5–8)
PLATELET # BLD AUTO: 384 THOUSANDS/UL (ref 149–390)
PMV BLD AUTO: 9.7 FL (ref 8.9–12.7)
POTASSIUM SERPL-SCNC: 3.7 MMOL/L (ref 3.5–5.3)
PROT SERPL-MCNC: 7.4 G/DL (ref 6.4–8.2)
PROT UR STRIP-MCNC: NEGATIVE MG/DL
RBC # BLD AUTO: 4.33 MILLION/UL (ref 3.81–5.12)
SODIUM SERPL-SCNC: 140 MMOL/L (ref 136–145)
SP GR UR STRIP.AUTO: <=1.005 (ref 1–1.03)
TROPONIN I SERPL-MCNC: <0.02 NG/ML
UROBILINOGEN UR QL STRIP.AUTO: 0.2 E.U./DL
WBC # BLD AUTO: 14.05 THOUSAND/UL (ref 4.31–10.16)

## 2020-12-03 PROCEDURE — 87637 SARSCOV2&INF A&B&RSV AMP PRB: CPT | Performed by: EMERGENCY MEDICINE

## 2020-12-03 PROCEDURE — 36415 COLL VENOUS BLD VENIPUNCTURE: CPT

## 2020-12-03 PROCEDURE — 80053 COMPREHEN METABOLIC PANEL: CPT | Performed by: EMERGENCY MEDICINE

## 2020-12-03 PROCEDURE — 84484 ASSAY OF TROPONIN QUANT: CPT | Performed by: EMERGENCY MEDICINE

## 2020-12-03 PROCEDURE — 71045 X-RAY EXAM CHEST 1 VIEW: CPT

## 2020-12-03 PROCEDURE — 85025 COMPLETE CBC W/AUTO DIFF WBC: CPT | Performed by: EMERGENCY MEDICINE

## 2020-12-03 PROCEDURE — 93005 ELECTROCARDIOGRAM TRACING: CPT

## 2020-12-03 PROCEDURE — 99285 EMERGENCY DEPT VISIT HI MDM: CPT | Performed by: EMERGENCY MEDICINE

## 2020-12-03 PROCEDURE — 99285 EMERGENCY DEPT VISIT HI MDM: CPT

## 2020-12-06 LAB
ATRIAL RATE: 63 BPM
P AXIS: 36 DEGREES
PR INTERVAL: 142 MS
QRS AXIS: 43 DEGREES
QRSD INTERVAL: 88 MS
QT INTERVAL: 408 MS
QTC INTERVAL: 408 MS
T WAVE AXIS: 12 DEGREES
VENTRICULAR RATE: 60 BPM

## 2020-12-06 PROCEDURE — 93010 ELECTROCARDIOGRAM REPORT: CPT | Performed by: INTERNAL MEDICINE

## 2020-12-08 LAB
FLUAV RNA NPH QL NAA+PROBE: NOT DETECTED
FLUBV RNA NPH QL NAA+PROBE: NOT DETECTED
RSV RNA NPH QL NAA+PROBE: NOT DETECTED
SARS-COV-2 RNA NPH QL NAA+PROBE: NOT DETECTED

## 2021-04-19 DIAGNOSIS — R10.9 ABDOMINAL PAIN, UNSPECIFIED ABDOMINAL LOCATION: ICD-10-CM

## 2021-04-19 RX ORDER — DICYCLOMINE HCL 20 MG
20 TABLET ORAL EVERY 6 HOURS
Qty: 360 TABLET | Refills: 2 | Status: SHIPPED | OUTPATIENT
Start: 2021-04-19 | End: 2022-01-17

## 2022-01-16 DIAGNOSIS — R10.9 ABDOMINAL PAIN, UNSPECIFIED ABDOMINAL LOCATION: ICD-10-CM

## 2022-01-17 RX ORDER — DICYCLOMINE HCL 20 MG
20 TABLET ORAL EVERY 6 HOURS
Qty: 360 TABLET | Refills: 2 | Status: SHIPPED | OUTPATIENT
Start: 2022-01-17

## 2022-10-19 DIAGNOSIS — R10.9 ABDOMINAL PAIN, UNSPECIFIED ABDOMINAL LOCATION: ICD-10-CM

## 2022-10-19 RX ORDER — DICYCLOMINE HCL 20 MG
TABLET ORAL
Qty: 360 TABLET | Refills: 2 | OUTPATIENT
Start: 2022-10-19

## 2023-12-21 PROCEDURE — 99284 EMERGENCY DEPT VISIT MOD MDM: CPT

## 2023-12-22 ENCOUNTER — HOSPITAL ENCOUNTER (EMERGENCY)
Facility: HOSPITAL | Age: 43
Discharge: HOME | End: 2023-12-22
Attending: EMERGENCY MEDICINE
Payer: COMMERCIAL

## 2023-12-22 ENCOUNTER — APPOINTMENT (EMERGENCY)
Dept: RADIOLOGY | Facility: HOSPITAL | Age: 43
End: 2023-12-22
Payer: COMMERCIAL

## 2023-12-22 ENCOUNTER — APPOINTMENT (EMERGENCY)
Dept: CT IMAGING | Facility: HOSPITAL | Age: 43
End: 2023-12-22
Payer: COMMERCIAL

## 2023-12-22 ENCOUNTER — HOSPITAL ENCOUNTER (OUTPATIENT)
Facility: HOSPITAL | Age: 43
Setting detail: OBSERVATION
LOS: 1 days | Discharge: HOME | End: 2023-12-24
Attending: EMERGENCY MEDICINE | Admitting: INTERNAL MEDICINE
Payer: COMMERCIAL

## 2023-12-22 VITALS
BODY MASS INDEX: 27.55 KG/M2 | OXYGEN SATURATION: 100 % | SYSTOLIC BLOOD PRESSURE: 154 MMHG | WEIGHT: 160.5 LBS | TEMPERATURE: 97.9 F | HEART RATE: 53 BPM | RESPIRATION RATE: 18 BRPM | DIASTOLIC BLOOD PRESSURE: 69 MMHG

## 2023-12-22 DIAGNOSIS — E86.0 DEHYDRATION: ICD-10-CM

## 2023-12-22 DIAGNOSIS — A08.4 VIRAL GASTROENTERITIS: Primary | ICD-10-CM

## 2023-12-22 DIAGNOSIS — R31.9 HEMATURIA: ICD-10-CM

## 2023-12-22 DIAGNOSIS — E87.6 HYPOKALEMIA: ICD-10-CM

## 2023-12-22 DIAGNOSIS — R11.10 VOMITING: Primary | ICD-10-CM

## 2023-12-22 DIAGNOSIS — R19.7 DIARRHEA: ICD-10-CM

## 2023-12-22 DIAGNOSIS — N39.0 UTI (URINARY TRACT INFECTION): ICD-10-CM

## 2023-12-22 DIAGNOSIS — U07.1 COVID-19: ICD-10-CM

## 2023-12-22 DIAGNOSIS — R82.4 KETONURIA: ICD-10-CM

## 2023-12-22 LAB
2HR DELTA HS TROPONIN: 9 NG/L
ALBUMIN SERPL BCP-MCNC: 5 G/DL (ref 3.5–5)
ALBUMIN SERPL BCP-MCNC: 5.5 G/DL (ref 3.5–5)
ALP SERPL-CCNC: 67 U/L (ref 34–104)
ALP SERPL-CCNC: 79 U/L (ref 34–104)
ALT SERPL W P-5'-P-CCNC: 22 U/L (ref 7–52)
ALT SERPL W P-5'-P-CCNC: 24 U/L (ref 7–52)
ANION GAP SERPL CALCULATED.3IONS-SCNC: 16 MMOL/L
ANION GAP SERPL CALCULATED.3IONS-SCNC: 17 MMOL/L
AST SERPL W P-5'-P-CCNC: 28 U/L (ref 13–39)
AST SERPL W P-5'-P-CCNC: 28 U/L (ref 13–39)
BACTERIA UR QL AUTO: ABNORMAL /HPF
BASOPHILS # BLD AUTO: 0.01 THOUSANDS/ÂΜL (ref 0–0.1)
BASOPHILS # BLD AUTO: 0.03 THOUSANDS/ÂΜL (ref 0–0.1)
BASOPHILS NFR BLD AUTO: 0 % (ref 0–1)
BASOPHILS NFR BLD AUTO: 0 % (ref 0–1)
BILIRUB SERPL-MCNC: 0.96 MG/DL (ref 0.2–1)
BILIRUB SERPL-MCNC: 1.56 MG/DL (ref 0.2–1)
BILIRUB UR QL STRIP: NEGATIVE
BUN SERPL-MCNC: 10 MG/DL (ref 5–25)
BUN SERPL-MCNC: 9 MG/DL (ref 5–25)
CALCIUM SERPL-MCNC: 10 MG/DL (ref 8.4–10.2)
CALCIUM SERPL-MCNC: 10.5 MG/DL (ref 8.4–10.2)
CARDIAC TROPONIN I PNL SERPL HS: 18 NG/L
CARDIAC TROPONIN I PNL SERPL HS: 9 NG/L
CHLORIDE SERPL-SCNC: 106 MMOL/L (ref 96–108)
CHLORIDE SERPL-SCNC: 106 MMOL/L (ref 96–108)
CLARITY UR: ABNORMAL
CO2 SERPL-SCNC: 16 MMOL/L (ref 21–32)
CO2 SERPL-SCNC: 16 MMOL/L (ref 21–32)
COLOR UR: YELLOW
CREAT SERPL-MCNC: 0.82 MG/DL (ref 0.6–1.3)
CREAT SERPL-MCNC: 0.84 MG/DL (ref 0.6–1.3)
D DIMER PPP FEU-MCNC: 0.31 UG/ML FEU
EOSINOPHIL # BLD AUTO: 0 THOUSAND/ÂΜL (ref 0–0.61)
EOSINOPHIL # BLD AUTO: 0.01 THOUSAND/ÂΜL (ref 0–0.61)
EOSINOPHIL NFR BLD AUTO: 0 % (ref 0–6)
EOSINOPHIL NFR BLD AUTO: 0 % (ref 0–6)
ERYTHROCYTE [DISTWIDTH] IN BLOOD BY AUTOMATED COUNT: 12.4 % (ref 11.6–15.1)
ERYTHROCYTE [DISTWIDTH] IN BLOOD BY AUTOMATED COUNT: 12.7 % (ref 11.6–15.1)
GFR SERPL CREATININE-BSD FRML MDRD: 85 ML/MIN/1.73SQ M
GFR SERPL CREATININE-BSD FRML MDRD: 87 ML/MIN/1.73SQ M
GLUCOSE SERPL-MCNC: 105 MG/DL (ref 65–140)
GLUCOSE SERPL-MCNC: 107 MG/DL (ref 65–140)
GLUCOSE SERPL-MCNC: 117 MG/DL (ref 65–140)
GLUCOSE UR STRIP-MCNC: ABNORMAL MG/DL
GRAN CASTS #/AREA URNS LPF: ABNORMAL /[LPF]
HCG SERPL QL: NEGATIVE
HCT VFR BLD AUTO: 40.1 % (ref 34.8–46.1)
HCT VFR BLD AUTO: 43.6 % (ref 34.8–46.1)
HGB BLD-MCNC: 13.9 G/DL (ref 11.5–15.4)
HGB BLD-MCNC: 15.3 G/DL (ref 11.5–15.4)
HGB UR QL STRIP.AUTO: ABNORMAL
HYALINE CASTS #/AREA URNS LPF: ABNORMAL /LPF
IMM GRANULOCYTES # BLD AUTO: 0.02 THOUSAND/UL (ref 0–0.2)
IMM GRANULOCYTES # BLD AUTO: 0.03 THOUSAND/UL (ref 0–0.2)
IMM GRANULOCYTES NFR BLD AUTO: 0 % (ref 0–2)
IMM GRANULOCYTES NFR BLD AUTO: 0 % (ref 0–2)
KETONES UR STRIP-MCNC: ABNORMAL MG/DL
LEUKOCYTE ESTERASE UR QL STRIP: NEGATIVE
LYMPHOCYTES # BLD AUTO: 1.01 THOUSANDS/ÂΜL (ref 0.6–4.47)
LYMPHOCYTES # BLD AUTO: 1.27 THOUSANDS/ÂΜL (ref 0.6–4.47)
LYMPHOCYTES NFR BLD AUTO: 12 % (ref 14–44)
LYMPHOCYTES NFR BLD AUTO: 16 % (ref 14–44)
MAGNESIUM SERPL-MCNC: 2.1 MG/DL (ref 1.9–2.7)
MCH RBC QN AUTO: 32.3 PG (ref 26.8–34.3)
MCH RBC QN AUTO: 32.4 PG (ref 26.8–34.3)
MCHC RBC AUTO-ENTMCNC: 34.7 G/DL (ref 31.4–37.4)
MCHC RBC AUTO-ENTMCNC: 35.1 G/DL (ref 31.4–37.4)
MCV RBC AUTO: 92 FL (ref 82–98)
MCV RBC AUTO: 94 FL (ref 82–98)
MONOCYTES # BLD AUTO: 0.81 THOUSAND/ÂΜL (ref 0.17–1.22)
MONOCYTES # BLD AUTO: 1 THOUSAND/ÂΜL (ref 0.17–1.22)
MONOCYTES NFR BLD AUTO: 10 % (ref 4–12)
MONOCYTES NFR BLD AUTO: 12 % (ref 4–12)
MUCOUS THREADS UR QL AUTO: ABNORMAL
NEUTROPHILS # BLD AUTO: 5.64 THOUSANDS/ÂΜL (ref 1.85–7.62)
NEUTROPHILS # BLD AUTO: 6.54 THOUSANDS/ÂΜL (ref 1.85–7.62)
NEUTS SEG NFR BLD AUTO: 74 % (ref 43–75)
NEUTS SEG NFR BLD AUTO: 76 % (ref 43–75)
NITRITE UR QL STRIP: NEGATIVE
NON-SQ EPI CELLS URNS QL MICRO: ABNORMAL /HPF
NRBC BLD AUTO-RTO: 0 /100 WBCS
NRBC BLD AUTO-RTO: 0 /100 WBCS
PH UR STRIP.AUTO: 6 [PH]
PLATELET # BLD AUTO: 323 THOUSANDS/UL (ref 149–390)
PLATELET # BLD AUTO: 326 THOUSANDS/UL (ref 149–390)
PMV BLD AUTO: 10.1 FL (ref 8.9–12.7)
PMV BLD AUTO: 10.4 FL (ref 8.9–12.7)
POTASSIUM SERPL-SCNC: 3.1 MMOL/L (ref 3.5–5.3)
POTASSIUM SERPL-SCNC: 3.6 MMOL/L (ref 3.5–5.3)
PROT SERPL-MCNC: 7.9 G/DL (ref 6.4–8.4)
PROT SERPL-MCNC: 9.5 G/DL (ref 6.4–8.4)
PROT UR STRIP-MCNC: ABNORMAL MG/DL
RBC # BLD AUTO: 4.29 MILLION/UL (ref 3.81–5.12)
RBC # BLD AUTO: 4.74 MILLION/UL (ref 3.81–5.12)
RBC #/AREA URNS AUTO: ABNORMAL /HPF
SODIUM SERPL-SCNC: 138 MMOL/L (ref 135–147)
SODIUM SERPL-SCNC: 139 MMOL/L (ref 135–147)
SP GR UR STRIP.AUTO: 1.03 (ref 1–1.03)
UROBILINOGEN UR STRIP-ACNC: 2 MG/DL
WBC # BLD AUTO: 7.78 THOUSAND/UL (ref 4.31–10.16)
WBC # BLD AUTO: 8.59 THOUSAND/UL (ref 4.31–10.16)
WBC #/AREA URNS AUTO: ABNORMAL /HPF

## 2023-12-22 PROCEDURE — 96365 THER/PROPH/DIAG IV INF INIT: CPT

## 2023-12-22 PROCEDURE — 87086 URINE CULTURE/COLONY COUNT: CPT | Performed by: EMERGENCY MEDICINE

## 2023-12-22 PROCEDURE — 96376 TX/PRO/DX INJ SAME DRUG ADON: CPT

## 2023-12-22 PROCEDURE — 96366 THER/PROPH/DIAG IV INF ADDON: CPT

## 2023-12-22 PROCEDURE — 80053 COMPREHEN METABOLIC PANEL: CPT | Performed by: EMERGENCY MEDICINE

## 2023-12-22 PROCEDURE — 96368 THER/DIAG CONCURRENT INF: CPT

## 2023-12-22 PROCEDURE — 82948 REAGENT STRIP/BLOOD GLUCOSE: CPT

## 2023-12-22 PROCEDURE — 93005 ELECTROCARDIOGRAM TRACING: CPT

## 2023-12-22 PROCEDURE — C9113 INJ PANTOPRAZOLE SODIUM, VIA: HCPCS | Performed by: EMERGENCY MEDICINE

## 2023-12-22 PROCEDURE — 36415 COLL VENOUS BLD VENIPUNCTURE: CPT

## 2023-12-22 PROCEDURE — 83735 ASSAY OF MAGNESIUM: CPT

## 2023-12-22 PROCEDURE — 96375 TX/PRO/DX INJ NEW DRUG ADDON: CPT

## 2023-12-22 PROCEDURE — 85379 FIBRIN DEGRADATION QUANT: CPT | Performed by: EMERGENCY MEDICINE

## 2023-12-22 PROCEDURE — 74177 CT ABD & PELVIS W/CONTRAST: CPT

## 2023-12-22 PROCEDURE — 85025 COMPLETE CBC W/AUTO DIFF WBC: CPT | Performed by: EMERGENCY MEDICINE

## 2023-12-22 PROCEDURE — 81001 URINALYSIS AUTO W/SCOPE: CPT

## 2023-12-22 PROCEDURE — 99285 EMERGENCY DEPT VISIT HI MDM: CPT | Performed by: EMERGENCY MEDICINE

## 2023-12-22 PROCEDURE — 84484 ASSAY OF TROPONIN QUANT: CPT | Performed by: EMERGENCY MEDICINE

## 2023-12-22 PROCEDURE — 84703 CHORIONIC GONADOTROPIN ASSAY: CPT | Performed by: EMERGENCY MEDICINE

## 2023-12-22 PROCEDURE — 85025 COMPLETE CBC W/AUTO DIFF WBC: CPT

## 2023-12-22 PROCEDURE — 80053 COMPREHEN METABOLIC PANEL: CPT

## 2023-12-22 PROCEDURE — 71045 X-RAY EXAM CHEST 1 VIEW: CPT

## 2023-12-22 PROCEDURE — 99284 EMERGENCY DEPT VISIT MOD MDM: CPT | Performed by: EMERGENCY MEDICINE

## 2023-12-22 PROCEDURE — 99285 EMERGENCY DEPT VISIT HI MDM: CPT

## 2023-12-22 PROCEDURE — 96361 HYDRATE IV INFUSION ADD-ON: CPT

## 2023-12-22 RX ORDER — ONDANSETRON 2 MG/ML
4 INJECTION INTRAMUSCULAR; INTRAVENOUS ONCE
Status: COMPLETED | OUTPATIENT
Start: 2023-12-22 | End: 2023-12-22

## 2023-12-22 RX ORDER — POTASSIUM CHLORIDE 14.9 MG/ML
20 INJECTION INTRAVENOUS ONCE
Status: COMPLETED | OUTPATIENT
Start: 2023-12-22 | End: 2023-12-23

## 2023-12-22 RX ORDER — ONDANSETRON 4 MG/1
4 TABLET, FILM COATED ORAL EVERY 6 HOURS
Qty: 12 TABLET | Refills: 0 | Status: SHIPPED | OUTPATIENT
Start: 2023-12-22 | End: 2023-12-24

## 2023-12-22 RX ORDER — LORAZEPAM 2 MG/ML
1 INJECTION INTRAMUSCULAR ONCE
Status: COMPLETED | OUTPATIENT
Start: 2023-12-22 | End: 2023-12-22

## 2023-12-22 RX ORDER — CEPHALEXIN 250 MG/1
250 CAPSULE ORAL EVERY 6 HOURS SCHEDULED
Qty: 20 CAPSULE | Refills: 0 | Status: SHIPPED | OUTPATIENT
Start: 2023-12-22 | End: 2023-12-24

## 2023-12-22 RX ORDER — PANTOPRAZOLE SODIUM 40 MG/10ML
40 INJECTION, POWDER, LYOPHILIZED, FOR SOLUTION INTRAVENOUS ONCE
Status: COMPLETED | OUTPATIENT
Start: 2023-12-22 | End: 2023-12-22

## 2023-12-22 RX ORDER — CEPHALEXIN 250 MG/1
250 CAPSULE ORAL ONCE
Status: DISCONTINUED | OUTPATIENT
Start: 2023-12-22 | End: 2023-12-22

## 2023-12-22 RX ADMIN — LORAZEPAM 1 MG: 2 INJECTION INTRAMUSCULAR; INTRAVENOUS at 20:58

## 2023-12-22 RX ADMIN — POTASSIUM CHLORIDE 20 MEQ: 14.9 INJECTION, SOLUTION INTRAVENOUS at 22:23

## 2023-12-22 RX ADMIN — IOHEXOL 85 ML: 350 INJECTION, SOLUTION INTRAVENOUS at 21:48

## 2023-12-22 RX ADMIN — ONDANSETRON 4 MG: 2 INJECTION INTRAMUSCULAR; INTRAVENOUS at 20:54

## 2023-12-22 RX ADMIN — ONDANSETRON 4 MG: 2 INJECTION INTRAMUSCULAR; INTRAVENOUS at 01:22

## 2023-12-22 RX ADMIN — SODIUM CHLORIDE, SODIUM LACTATE, POTASSIUM CHLORIDE, AND CALCIUM CHLORIDE 1000 ML: .6; .31; .03; .02 INJECTION, SOLUTION INTRAVENOUS at 20:57

## 2023-12-22 RX ADMIN — DEXTROSE 1000 MG: 50 INJECTION, SOLUTION INTRAVENOUS at 03:29

## 2023-12-22 RX ADMIN — ONDANSETRON 4 MG: 2 INJECTION INTRAMUSCULAR; INTRAVENOUS at 03:06

## 2023-12-22 RX ADMIN — SODIUM CHLORIDE 1000 ML: 0.9 INJECTION, SOLUTION INTRAVENOUS at 01:23

## 2023-12-22 RX ADMIN — SODIUM CHLORIDE 500 ML: 0.9 INJECTION, SOLUTION INTRAVENOUS at 03:28

## 2023-12-22 RX ADMIN — PANTOPRAZOLE SODIUM 40 MG: 40 INJECTION, POWDER, FOR SOLUTION INTRAVENOUS at 20:52

## 2023-12-22 NOTE — ED PROVIDER NOTES
History  Chief Complaint   Patient presents with    Vomiting     Tuesday, pt started with nausea. Since Wednesday morning, patient has been vomiting, unable to keep food/fluids/medications down. Denies abdominal pain/chest pain/SOB. Hx of gallbladder removal and vomiting after procedure in November.      Patient is a 43-year-old female with past medical history significant for anxiety, status post hysterectomy presenting to the emergency department for evaluation of nausea, vomiting, diarrhea.  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  Beginning approximately 3 days ago patient reports persistent nonbloody nonbilious emesis and multiple episodes of nonbloody diarrhea.  Patient reports she has been unable to take any of her medication for approximately 2 days now. She was initially trying to handle the nausea/vomiting/diarrhea with increased PO intake but states she feels she is significantly dehydrated. She endorses weakness, denies abdominal pain.        Prior to Admission Medications   Prescriptions Last Dose Informant Patient Reported? Taking?   ALPRAZolam (XANAX) 0.25 mg tablet   Yes No   Sig: alprazolam 0.25 mg tablet   PARoxetine (PAXIL) 10 mg tablet   Yes No   Sig: paroxetine 10 mg tablet   acetaminophen (TYLENOL) 500 mg tablet   No No   Sig: Take 2 tablets (1,000 mg total) by mouth every 6 (six) hours   butalbital-acetaminophen-caffeine (FIORICET,ESGIC) -40 mg per tablet   No No   Sig: Take 1 tablet by mouth every 4 (four) hours as needed for headaches   ciprofloxacin (CIPRO) 250 mg tablet   Yes No   Sig: ciprofloxacin 250 mg tablet   dicyclomine (BENTYL) 20 mg tablet   No No   Sig: TAKE 1 TABLET (20 MG TOTAL) BY MOUTH EVERY 6 (SIX) HOURS   escitalopram (LEXAPRO) 10 mg tablet   Yes No   Sig: Take 10 mg by mouth daily   fluconazole (DIFLUCAN) 150 mg tablet   Yes No   Sig: fluconazole 150 mg tablet   fluconazole (DIFLUCAN) 200 mg tablet   Yes No    Sig: fluconazole 200 mg tablet   ibuprofen (MOTRIN) 200 mg tablet   Yes No   Sig: Take 600 mg by mouth every 6 (six) hours as needed   metroNIDAZOLE (FLAGYL) 500 mg tablet   Yes No   Sig: metronidazole 500 mg tablet   naproxen sodium (ANAPROX) 550 mg tablet   Yes No   Sig: Take 550 mg by mouth   ondansetron (ZOFRAN) 4 mg tablet   Yes No   Sig: ondansetron HCl 4 mg tablet   oxyCODONE-acetaminophen (PERCOCET) 5-325 mg per tablet   Yes No   Sig: oxycodone-acetaminophen 5 mg-325 mg tablet   pantoprazole (PROTONIX) 20 mg tablet   No No   Sig: Take 1 tablet (20 mg total) by mouth daily   traMADol (ULTRAM) 50 mg tablet   Yes No   Sig: Take 50 mg by mouth every 6 (six) hours as needed      Facility-Administered Medications: None       Past Medical History:   Diagnosis Date    Anxiety     Bladder problem     Heart murmur     Migraine     Ovarian cyst     left       Past Surgical History:   Procedure Laterality Date    ARTHROSCOPY KNEE Left     CHOLECYSTECTOMY  11/01/2023    DILATION AND CURETTAGE OF UTERUS  07/08/2014    HYSTERECTOMY  08/18/2014    Has ovaries    HYSTEROSCOPY  07/08/2014    KNEE SURGERY      LASER ABLATION OF THE CERVIX  07/07/2014    OVARIAN CYST REMOVAL      TUBAL LIGATION  07/07/2014    WISDOM TOOTH EXTRACTION      x4        Family History   Problem Relation Age of Onset    Diabetes Father     Lung cancer Maternal Grandmother      I have reviewed and agree with the history as documented.    E-Cigarette/Vaping    E-Cigarette Use Never User      E-Cigarette/Vaping Substances    Nicotine No     THC No     CBD No     Flavoring No     Other No     Unknown No      Social History     Tobacco Use    Smoking status: Former     Average packs/day: 0.4 packs/day for 17.1 years (7.5 ttl pk-yrs)     Types: Cigarettes     Start date: 12/1/2006    Smokeless tobacco: Former   Vaping Use    Vaping status: Never Used   Substance Use Topics    Alcohol use: Yes     Comment: social    Drug use: No        Review of Systems    Constitutional:  Positive for fatigue.   HENT: Negative.     Eyes: Negative.    Respiratory: Negative.     Cardiovascular: Negative.    Gastrointestinal:  Positive for diarrhea, nausea and vomiting.   Endocrine: Negative.    Genitourinary:  Positive for decreased urine volume.   Musculoskeletal: Negative.    Skin: Negative.    Allergic/Immunologic: Negative.    Neurological: Negative.    Hematological: Negative.    Psychiatric/Behavioral: Negative.     All other systems reviewed and are negative.      Physical Exam  ED Triage Vitals [12/22/23 0007]   Temperature Pulse Respirations Blood Pressure SpO2   97.9 °F (36.6 °C) (!) 113 18 113/53 99 %      Temp Source Heart Rate Source Patient Position - Orthostatic VS BP Location FiO2 (%)   Oral Monitor Sitting Right arm --      Pain Score       --             Orthostatic Vital Signs  Vitals:    12/22/23 0007 12/22/23 0229 12/22/23 0230   BP: 113/53 135/63 154/69   Pulse: (!) 113 55 (!) 53   Patient Position - Orthostatic VS: Sitting Sitting        Physical Exam  Vitals and nursing note reviewed.   Constitutional:       General: She is not in acute distress.     Appearance: Normal appearance. She is not ill-appearing, toxic-appearing or diaphoretic.   HENT:      Head: Normocephalic and atraumatic.   Eyes:      General: No scleral icterus.        Right eye: No discharge.         Left eye: No discharge.      Extraocular Movements: Extraocular movements intact.      Conjunctiva/sclera: Conjunctivae normal.   Cardiovascular:      Rate and Rhythm: Normal rate.      Pulses: Normal pulses.      Heart sounds: Normal heart sounds. No murmur heard.     No friction rub. No gallop.   Pulmonary:      Effort: Pulmonary effort is normal. No respiratory distress.      Breath sounds: Normal breath sounds. No stridor. No wheezing, rhonchi or rales.   Abdominal:      General: Abdomen is flat. Bowel sounds are normal. There is no distension.      Palpations: Abdomen is soft.      Tenderness:  There is no abdominal tenderness. There is no guarding or rebound.   Musculoskeletal:         General: No swelling. Normal range of motion.      Cervical back: Normal range of motion. No rigidity.      Right lower leg: No edema.      Left lower leg: No edema.   Skin:     General: Skin is warm and dry.      Coloration: Skin is not jaundiced.      Findings: No bruising or lesion.   Neurological:      General: No focal deficit present.      Mental Status: She is alert and oriented to person, place, and time. Mental status is at baseline.   Psychiatric:         Mood and Affect: Mood normal.         Behavior: Behavior normal.         Thought Content: Thought content normal.         Judgment: Judgment normal.         ED Medications  Medications   sodium chloride 0.9 % bolus 1,000 mL (0 mL Intravenous Stopped 12/22/23 0305)   ondansetron (ZOFRAN) injection 4 mg (4 mg Intravenous Given 12/22/23 0122)   ondansetron (ZOFRAN) injection 4 mg (4 mg Intravenous Given 12/22/23 0306)   ceftriaxone (ROCEPHIN) 1 g/50 mL in dextrose IVPB (0 mg Intravenous Stopped 12/22/23 0407)   sodium chloride 0.9 % bolus 500 mL (0 mL Intravenous Stopped 12/22/23 0407)       Diagnostic Studies  Results Reviewed       Procedure Component Value Units Date/Time    Comprehensive metabolic panel [[id number]]  (Abnormal) Collected: 12/22/23 0117    Lab Status: Final result Specimen: Blood from Arm, Right Updated: 12/22/23 0156     Sodium 139 mmol/L      Potassium 3.6 mmol/L      Chloride 106 mmol/L      CO2 16 mmol/L      ANION GAP 17 mmol/L      BUN 10 mg/dL      Creatinine 0.82 mg/dL      Glucose 117 mg/dL      Calcium 10.5 mg/dL      AST 28 U/L      ALT 24 U/L      Alkaline Phosphatase 79 U/L      Total Protein 9.5 g/dL      Albumin 5.5 g/dL      Total Bilirubin 0.96 mg/dL      eGFR 87 ml/min/1.73sq m     Narrative:      National Kid[employer] Foundation guidelines for Chronic Kidney Disease (CKD):     Stage 1 with normal or high GFR (GFR > 90  mL/min/1.73 square meters)    Stage 2 Mild CKD (GFR = 60-89 mL/min/1.73 square meters)    Stage 3A Moderate CKD (GFR = 45-59 mL/min/1.73 square meters)    Stage 3B Moderate CKD (GFR = 30-44 mL/min/1.73 square meters)    Stage 4 Severe CKD (GFR = 15-29 mL/min/1.73 square meters)    Stage 5 End Stage CKD (GFR <15 mL/min/1.73 square meters)  Note: GFR calculation is accurate only with a steady state creatinine    Magnesium [[id number]]  (Normal) Collected: 12/22/23 0117    Lab Status: Final result Specimen: Blood from Arm, Right Updated: 12/22/23 0156     Magnesium 2.1 mg/dL     Urine culture [[id number]] Collected: 12/22/23 0128    Lab Status: In process Specimen: Urine, Clean Catch Updated: 12/22/23 0156    Urine Microscopic [[id number]]  (Abnormal) Collected: 12/22/23 0128    Lab Status: Final result Specimen: Urine, Clean Catch Updated: 12/22/23 0149     RBC, UA 10-20 /hpf      WBC, UA 4-10 /hpf      Epithelial Cells Moderate /hpf      Bacteria, UA Occasional /hpf      MUCUS THREADS Innumerable     Hyaline Casts, UA 25-50 /lpf      Granular Casts, UA 10-25    UA (URINE) with reflex to Scope [[id number]]  (Abnormal) Collected: 12/22/23 0128    Lab Status: Final result Specimen: Urine, Clean Catch Updated: 12/22/23 0140     Color, UA Yellow     Clarity, UA Turbid     Specific Gravity, UA 1.035     pH, UA 6.0     Leukocytes, UA Negative     Nitrite, UA Negative     Protein,  (3+) mg/dl      Glucose, UA Trace mg/dl      Ketones, UA >=150 (4+) mg/dl      Urobilinogen, UA 2.0 mg/dl      Bilirubin, UA Negative     Occult Blood, UA Moderate    CBC and differential [[id number]]  (Abnormal) Collected: 12/22/23 0117    Lab Status: Final result Specimen: Blood from Arm, Right Updated: 12/22/23 0136     WBC 8.59 Thousand/uL      RBC 4.74 Million/uL      Hemoglobin 15.3 g/dL      Hematocrit 43.6 %      MCV 92 fL      MCH 32.3 pg      MCHC 35.1 g/dL      RDW 12.4 %      MPV 10.4 fL      Platelets 326 Thousands/uL      nRBC 0  /100 WBCs      Neutrophils Relative 76 %      Immat GRANS % 0 %      Lymphocytes Relative 12 %      Monocytes Relative 12 %      Eosinophils Relative 0 %      Basophils Relative 0 %      Neutrophils Absolute 6.54 Thousands/µL      Immature Grans Absolute 0.03 Thousand/uL      Lymphocytes Absolute 1.01 Thousands/µL      Monocytes Absolute 1.00 Thousand/µL      Eosinophils Absolute 0.00 Thousand/µL      Basophils Absolute 0.01 Thousands/µL                    No orders to display         Procedures  Procedures      ED Course  ED Course as of 12/22/23 0521   Fri Dec 22, 2023   0142 CBC and differential(!)   0142 Ketones, UA(!): >=150 (4+)   0142 Blood, UA(!): Moderate                             SBIRT 22yo+      Flowsheet Row Most Recent Value   Initial Alcohol Screen: US AUDIT-C     1. How often do you have a drink containing alcohol? 1 Filed at: 12/22/2023 0400   2. How many drinks containing alcohol do you have on a typical day you are drinking?  1 Filed at: 12/22/2023 0400   3a. Male UNDER 65: How often do you have five or more drinks on one occasion? 0 Filed at: 12/22/2023 0400   3b. FEMALE Any Age, or MALE 65+: How often do you have 4 or more drinks on one occassion? 0 Filed at: 12/22/2023 0400   Audit-C Score 2 Filed at: 12/22/2023 0400   FRANCES: How many times in the past year have you...    Used an illegal drug or used a prescription medication for non-medical reasons? Never Filed at: 12/22/2023 0400                  Medical Decision Making  Patient is a 43y F presenting to the ED for evaluation of nausea, vomiting, diarrhea.  On physical examination patient appears dry but is otherwise in no acute distress.  Her abdomen is soft, nontender without distention, guarding, rebound, rigidity.  Laboratory evaluation is remarkable for multiple derangements. Notably she has an anion gap of 17 and marked ketonuria, suggestive of marked dehydration. Patient does have minimal bactiuria which could be contributing to her  clinical picture. Overall patient received 1.5L NS with 1g rocephin for UTI. After 8 mg zofran patient feels much better. Will send zofran in for patient. Given lack of focality to abdominal exam patient doubt biliary pathology, SBO, pancreatitis, appendicitis, dissection, AAA rupture. No hx T1/T2 DM. Likely viral GI illness w/ resultant diarrhea and vomiting. Will recommend symptomatic treatment. Spoke at length with patient regarding the need for follow-up urinalysis to ensure hematuria and additional derangements resolve. Patient verbalized understanding. Will d/c home w/ instructions for f/u and RTER precautions.    Amount and/or Complexity of Data Reviewed  Labs: ordered. Decision-making details documented in ED Course.    Risk  Prescription drug management.          Disposition  Final diagnoses:   Viral gastroenteritis   Ketonuria   Hematuria   UTI (urinary tract infection)     Time reflects when diagnosis was documented in both MDM as applicable and the Disposition within this note       Time User Action Codes Description Comment    12/22/2023  2:51 AM Rolan Rapp Add [A08.4] Viral gastroenteritis     12/22/2023  2:51 AM Rolan Rapp Add [R82.4] Ketonuria     12/22/2023  2:51 AM Rolan Rapp Add [R31.9] Hematuria     12/22/2023  2:51 AM Rolan Rapp Add [N39.0] UTI (urinary tract infection)           ED Disposition       ED Disposition   Discharge    Condition   Stable    Date/Time   Fri Dec 22, 2023 0251    Comment   Suzanna Guerrero discharge to home/self care.                   Follow-up Information       Follow up With Specialties Details Why Contact Info    Alejandra Esparza MD Internal Medicine Call in 1 week  [address] [facility] [address]  [address]  [place] [address] [phone]              Discharge Medication List as of 12/22/2023  2:53 AM        START taking these medications    Details   cephalexin (KEFLEX) 250 mg capsule Take 1 capsule (250 mg total) by mouth every 6 (six) hours for 5  days, Starting Fri 12/22/2023, Until Wed 12/27/2023, Normal           CONTINUE these medications which have NOT CHANGED    Details   acetaminophen (TYLENOL) 500 mg tablet Take 2 tablets (1,000 mg total) by mouth every 6 (six) hours, Starting Wed 9/19/2018, No Print      ALPRAZolam (XANAX) 0.25 mg tablet alprazolam 0.25 mg tablet, Historical Med      butalbital-acetaminophen-caffeine (FIORICET,ESGIC) -40 mg per tablet Take 1 tablet by mouth every 4 (four) hours as needed for headaches, Starting Tue 9/11/2018, Print      ciprofloxacin (CIPRO) 250 mg tablet ciprofloxacin 250 mg tablet, Historical Med      dicyclomine (BENTYL) 20 mg tablet TAKE 1 TABLET (20 MG TOTAL) BY MOUTH EVERY 6 (SIX) HOURS, Starting Mon 1/17/2022, Normal      escitalopram (LEXAPRO) 10 mg tablet Take 10 mg by mouth daily, Historical Med      !! fluconazole (DIFLUCAN) 150 mg tablet fluconazole 150 mg tablet, Historical Med      !! fluconazole (DIFLUCAN) 200 mg tablet fluconazole 200 mg tablet, Historical Med      ibuprofen (MOTRIN) 200 mg tablet Take 600 mg by mouth every 6 (six) hours as needed, Starting Thu 2/7/2019, Historical Med      metroNIDAZOLE (FLAGYL) 500 mg tablet metronidazole 500 mg tablet, Historical Med      naproxen sodium (ANAPROX) 550 mg tablet Take 550 mg by mouth, Historical Med      ondansetron (ZOFRAN) 4 mg tablet ondansetron HCl 4 mg tablet, Historical Med      oxyCODONE-acetaminophen (PERCOCET) 5-325 mg per tablet oxycodone-acetaminophen 5 mg-325 mg tablet, Historical Med      pantoprazole (PROTONIX) 20 mg tablet Take 1 tablet (20 mg total) by mouth daily, Starting Wed 6/3/2020, Normal      PARoxetine (PAXIL) 10 mg tablet paroxetine 10 mg tablet, Historical Med      traMADol (ULTRAM) 50 mg tablet Take 50 mg by mouth every 6 (six) hours as needed, Historical Med       !! - Potential duplicate medications found. Please discuss with provider.        No discharge procedures on file.    PDMP Review         Value Time User     PDMP Reviewed  Yes 12/21/2023 11:54 PM rGover Kapoor MD             ED Provider  Attending physically available and evaluated Suzanna Guerrero. I managed the patient along with the ED Attending.    Electronically Signed by           Rolan Rapp,   12/22/23 0454       Rolan Rapp,   12/22/23 0577

## 2023-12-22 NOTE — ED ATTENDING ATTESTATION
12/21/2023  I, Grover Kapoor MD, saw and evaluated the patient. I have discussed the patient with the resident/non-physician practitioner and agree with the resident's/non-physician practitioner's findings, Plan of Care, and MDM as documented in the resident's/non-physician practitioner's note, except where noted. All available labs and Radiology studies were reviewed.  I was present for key portions of any procedure(s) performed by the resident/non-physician practitioner and I was immediately available to provide assistance.       At this point I agree with the current assessment done in the Emergency Department.  I have conducted an independent evaluation of this patient a history and physical is as follows:  Patient is a 43 year old female with 2 days of worsening N/V/D. No abdominal pain. No fever. No travel. ?ill contacts at work. No melena. Slight blood with vomiting. No anticoagulant use. Has had prior hysterectomy and ccy. Decreased urination. Was last seen in this ED on 12/3/20 for URI. [facility] website checked on this patient and last Rx filled was on 11/1/23 for percocet for 5 day supply. NCAT. Mucous membranes somewhat moist. No scleral icterus. No pale conjunctiva. Lungs clear. Tachycardia without murmur. Abdomen soft and nontender. Good bowel sounds. No edema. No rash noted. No jaundice. DDx including but not limited to: gastroenteritis, food poisoning, metabolic abnormality, dehydration, JAMAL, mesenteric adenitis; doubt appendicitis, IBD, IBS, ileus, bowel obstruction, toxic megacolon; colitis, enteritis, gastritis, PUD, GERD, hepatitis; doubt pancreatitis, choledocholithiasis, doubt splenic etiology; renal colic, pyelonephritis, UTI. Will check labs and give IVFs and zofran.     ED Course         Critical Care Time  Procedures

## 2023-12-22 NOTE — LETTER
Thank you for allowing us to participate in the care of your patient, Suzanna Guerrero, who was hospitalized from 12/22/2023 through 12/24/2023 with the admitting diagnosis of intractable vomiting with nausea in the setting of COVID-19 infection.  Patient was treated with supportive care i.e. Zofran/Reglan/Compazine for her symptoms with IV fluid.  She was able to tolerate oral intake today.  Her potassium level was found to be low in the setting of her poor oral intake, vomiting and diarrhea.  We repleted her potassium as needed.  She will need to repeat another BMP within 1 week of discharge.  She is medically stable to be discharged.      If you have any additional questions or would like to discuss further, please feel free to contact me.    Jimmy Brock MD  [facility], Hospitalist  [phone]

## 2023-12-22 NOTE — Clinical Note
Suzanna Guerrero was seen and treated in our emergency department on 12/21/2023.                Diagnosis:     Suzanna  may return to work on return date.    She may return on this date: 12/26/2023         If you have any questions or concerns, please don't hesitate to call.      Rolan Rapp, DO    ______________________________           _______________          _______________  Hospital Representative                              Date                                Time

## 2023-12-23 PROBLEM — E78.5 HYPERLIPIDEMIA: Status: ACTIVE | Noted: 2023-12-23

## 2023-12-23 PROBLEM — U07.1 COVID-19: Status: ACTIVE | Noted: 2023-12-23

## 2023-12-23 PROBLEM — F41.9 ANXIETY: Status: ACTIVE | Noted: 2023-12-23

## 2023-12-23 PROBLEM — R11.2 INTRACTABLE VOMITING WITH NAUSEA: Status: ACTIVE | Noted: 2023-11-03

## 2023-12-23 PROBLEM — K59.1 FUNCTIONAL DIARRHEA: Status: ACTIVE | Noted: 2021-07-15

## 2023-12-23 PROBLEM — E87.6 HYPOKALEMIA: Status: ACTIVE | Noted: 2023-12-23

## 2023-12-23 LAB
4HR DELTA HS TROPONIN: 18 NG/L
ALBUMIN SERPL BCP-MCNC: 4.4 G/DL (ref 3.5–5)
ALP SERPL-CCNC: 59 U/L (ref 34–104)
ALT SERPL W P-5'-P-CCNC: 21 U/L (ref 7–52)
ANION GAP SERPL CALCULATED.3IONS-SCNC: 12 MMOL/L
AST SERPL W P-5'-P-CCNC: 22 U/L (ref 13–39)
BACTERIA UR CULT: NORMAL
BASE EX.OXY STD BLDV CALC-SCNC: 96.1 % (ref 60–80)
BASE EXCESS BLDV CALC-SCNC: -2.7 MMOL/L
BASOPHILS # BLD AUTO: 0.01 THOUSANDS/ÂΜL (ref 0–0.1)
BASOPHILS NFR BLD AUTO: 0 % (ref 0–1)
BILIRUB SERPL-MCNC: 1.18 MG/DL (ref 0.2–1)
BUN SERPL-MCNC: 5 MG/DL (ref 5–25)
CALCIUM SERPL-MCNC: 8.6 MG/DL (ref 8.4–10.2)
CARDIAC TROPONIN I PNL SERPL HS: 27 NG/L
CHLORIDE SERPL-SCNC: 105 MMOL/L (ref 96–108)
CO2 SERPL-SCNC: 20 MMOL/L (ref 21–32)
CREAT SERPL-MCNC: 0.56 MG/DL (ref 0.6–1.3)
EOSINOPHIL # BLD AUTO: 0 THOUSAND/ÂΜL (ref 0–0.61)
EOSINOPHIL NFR BLD AUTO: 0 % (ref 0–6)
ERYTHROCYTE [DISTWIDTH] IN BLOOD BY AUTOMATED COUNT: 12.5 % (ref 11.6–15.1)
FLUAV RNA RESP QL NAA+PROBE: NEGATIVE
FLUBV RNA RESP QL NAA+PROBE: NEGATIVE
GFR SERPL CREATININE-BSD FRML MDRD: 114 ML/MIN/1.73SQ M
GLUCOSE P FAST SERPL-MCNC: 94 MG/DL (ref 65–99)
GLUCOSE SERPL-MCNC: 94 MG/DL (ref 65–140)
HCO3 BLDV-SCNC: 20.6 MMOL/L (ref 24–30)
HCT VFR BLD AUTO: 35.9 % (ref 34.8–46.1)
HGB BLD-MCNC: 12.5 G/DL (ref 11.5–15.4)
IMM GRANULOCYTES # BLD AUTO: 0.03 THOUSAND/UL (ref 0–0.2)
IMM GRANULOCYTES NFR BLD AUTO: 0 % (ref 0–2)
LYMPHOCYTES # BLD AUTO: 1 THOUSANDS/ÂΜL (ref 0.6–4.47)
LYMPHOCYTES NFR BLD AUTO: 13 % (ref 14–44)
MAGNESIUM SERPL-MCNC: 1.7 MG/DL (ref 1.9–2.7)
MCH RBC QN AUTO: 32.1 PG (ref 26.8–34.3)
MCHC RBC AUTO-ENTMCNC: 34.8 G/DL (ref 31.4–37.4)
MCV RBC AUTO: 92 FL (ref 82–98)
MONOCYTES # BLD AUTO: 0.83 THOUSAND/ÂΜL (ref 0.17–1.22)
MONOCYTES NFR BLD AUTO: 11 % (ref 4–12)
NEUTROPHILS # BLD AUTO: 5.67 THOUSANDS/ÂΜL (ref 1.85–7.62)
NEUTS SEG NFR BLD AUTO: 76 % (ref 43–75)
NRBC BLD AUTO-RTO: 0 /100 WBCS
O2 CT BLDV-SCNC: 18.4 ML/DL
PCO2 BLDV: 31.7 MM HG (ref 42–50)
PH BLDV: 7.43 [PH] (ref 7.3–7.4)
PHOSPHATE SERPL-MCNC: 3.3 MG/DL (ref 2.7–4.5)
PLATELET # BLD AUTO: 269 THOUSANDS/UL (ref 149–390)
PMV BLD AUTO: 9.9 FL (ref 8.9–12.7)
PO2 BLDV: 106.7 MM HG (ref 35–45)
POTASSIUM SERPL-SCNC: 3.1 MMOL/L (ref 3.5–5.3)
PROCALCITONIN SERPL-MCNC: <0.05 NG/ML
PROT SERPL-MCNC: 7 G/DL (ref 6.4–8.4)
RBC # BLD AUTO: 3.9 MILLION/UL (ref 3.81–5.12)
RSV RNA RESP QL NAA+PROBE: NEGATIVE
SARS-COV-2 RNA RESP QL NAA+PROBE: POSITIVE
SODIUM SERPL-SCNC: 137 MMOL/L (ref 135–147)
WBC # BLD AUTO: 7.54 THOUSAND/UL (ref 4.31–10.16)

## 2023-12-23 PROCEDURE — 36415 COLL VENOUS BLD VENIPUNCTURE: CPT | Performed by: EMERGENCY MEDICINE

## 2023-12-23 PROCEDURE — 82805 BLOOD GASES W/O2 SATURATION: CPT | Performed by: INTERNAL MEDICINE

## 2023-12-23 PROCEDURE — C9113 INJ PANTOPRAZOLE SODIUM, VIA: HCPCS | Performed by: INTERNAL MEDICINE

## 2023-12-23 PROCEDURE — 80053 COMPREHEN METABOLIC PANEL: CPT | Performed by: PHYSICIAN ASSISTANT

## 2023-12-23 PROCEDURE — 84484 ASSAY OF TROPONIN QUANT: CPT | Performed by: EMERGENCY MEDICINE

## 2023-12-23 PROCEDURE — 0241U HB NFCT DS VIR RESP RNA 4 TRGT: CPT | Performed by: EMERGENCY MEDICINE

## 2023-12-23 PROCEDURE — 99223 1ST HOSP IP/OBS HIGH 75: CPT | Performed by: INTERNAL MEDICINE

## 2023-12-23 PROCEDURE — 84100 ASSAY OF PHOSPHORUS: CPT | Performed by: INTERNAL MEDICINE

## 2023-12-23 PROCEDURE — 83735 ASSAY OF MAGNESIUM: CPT

## 2023-12-23 PROCEDURE — 85025 COMPLETE CBC W/AUTO DIFF WBC: CPT | Performed by: PHYSICIAN ASSISTANT

## 2023-12-23 PROCEDURE — 84145 PROCALCITONIN (PCT): CPT | Performed by: PHYSICIAN ASSISTANT

## 2023-12-23 RX ORDER — PROCHLORPERAZINE MALEATE 10 MG
10 TABLET ORAL EVERY 6 HOURS PRN
Status: DISCONTINUED | OUTPATIENT
Start: 2023-12-23 | End: 2023-12-24 | Stop reason: HOSPADM

## 2023-12-23 RX ORDER — MAGNESIUM SULFATE HEPTAHYDRATE 40 MG/ML
2 INJECTION, SOLUTION INTRAVENOUS ONCE
Status: COMPLETED | OUTPATIENT
Start: 2023-12-23 | End: 2023-12-23

## 2023-12-23 RX ORDER — ONDANSETRON 2 MG/ML
8 INJECTION INTRAMUSCULAR; INTRAVENOUS EVERY 6 HOURS PRN
Status: DISCONTINUED | OUTPATIENT
Start: 2023-12-23 | End: 2023-12-24 | Stop reason: HOSPADM

## 2023-12-23 RX ORDER — PANTOPRAZOLE SODIUM 40 MG/10ML
40 INJECTION, POWDER, LYOPHILIZED, FOR SOLUTION INTRAVENOUS EVERY 12 HOURS SCHEDULED
Status: DISCONTINUED | OUTPATIENT
Start: 2023-12-23 | End: 2023-12-24 | Stop reason: HOSPADM

## 2023-12-23 RX ORDER — DEXTROSE AND SODIUM CHLORIDE 5; .9 G/100ML; G/100ML
100 INJECTION, SOLUTION INTRAVENOUS CONTINUOUS
Status: DISCONTINUED | OUTPATIENT
Start: 2023-12-23 | End: 2023-12-24 | Stop reason: HOSPADM

## 2023-12-23 RX ORDER — ONDANSETRON 2 MG/ML
4 INJECTION INTRAMUSCULAR; INTRAVENOUS EVERY 6 HOURS PRN
Status: DISCONTINUED | OUTPATIENT
Start: 2023-12-23 | End: 2023-12-23

## 2023-12-23 RX ORDER — ACETAMINOPHEN 325 MG/1
650 TABLET ORAL EVERY 6 HOURS PRN
Status: DISCONTINUED | OUTPATIENT
Start: 2023-12-23 | End: 2023-12-24 | Stop reason: HOSPADM

## 2023-12-23 RX ORDER — ENOXAPARIN SODIUM 100 MG/ML
30 INJECTION SUBCUTANEOUS EVERY 12 HOURS SCHEDULED
Status: DISCONTINUED | OUTPATIENT
Start: 2023-12-23 | End: 2023-12-24 | Stop reason: HOSPADM

## 2023-12-23 RX ORDER — POTASSIUM CHLORIDE 14.9 MG/ML
20 INJECTION INTRAVENOUS ONCE
Status: COMPLETED | OUTPATIENT
Start: 2023-12-23 | End: 2023-12-24

## 2023-12-23 RX ORDER — PANTOPRAZOLE SODIUM 20 MG/1
20 TABLET, DELAYED RELEASE ORAL DAILY
Status: DISCONTINUED | OUTPATIENT
Start: 2023-12-23 | End: 2023-12-23

## 2023-12-23 RX ORDER — ESCITALOPRAM OXALATE 10 MG/1
10 TABLET ORAL DAILY
Status: DISCONTINUED | OUTPATIENT
Start: 2023-12-23 | End: 2023-12-24 | Stop reason: HOSPADM

## 2023-12-23 RX ORDER — SODIUM CHLORIDE 9 MG/ML
100 INJECTION, SOLUTION INTRAVENOUS CONTINUOUS
Status: DISCONTINUED | OUTPATIENT
Start: 2023-12-23 | End: 2023-12-23

## 2023-12-23 RX ORDER — METOCLOPRAMIDE HYDROCHLORIDE 5 MG/ML
10 INJECTION INTRAMUSCULAR; INTRAVENOUS EVERY 6 HOURS PRN
Status: DISCONTINUED | OUTPATIENT
Start: 2023-12-23 | End: 2023-12-24 | Stop reason: HOSPADM

## 2023-12-23 RX ADMIN — ONDANSETRON 4 MG: 2 INJECTION INTRAMUSCULAR; INTRAVENOUS at 09:35

## 2023-12-23 RX ADMIN — ENOXAPARIN SODIUM 30 MG: 30 INJECTION SUBCUTANEOUS at 02:31

## 2023-12-23 RX ADMIN — ENOXAPARIN SODIUM 30 MG: 30 INJECTION SUBCUTANEOUS at 21:28

## 2023-12-23 RX ADMIN — DEXTROSE AND SODIUM CHLORIDE 100 ML/HR: 5; .9 INJECTION, SOLUTION INTRAVENOUS at 11:51

## 2023-12-23 RX ADMIN — ONDANSETRON 8 MG: 2 INJECTION INTRAMUSCULAR; INTRAVENOUS at 15:29

## 2023-12-23 RX ADMIN — PANTOPRAZOLE SODIUM 40 MG: 40 INJECTION, POWDER, FOR SOLUTION INTRAVENOUS at 11:17

## 2023-12-23 RX ADMIN — SODIUM CHLORIDE 100 ML/HR: 0.9 INJECTION, SOLUTION INTRAVENOUS at 09:41

## 2023-12-23 RX ADMIN — ONDANSETRON 8 MG: 2 INJECTION INTRAMUSCULAR; INTRAVENOUS at 22:03

## 2023-12-23 RX ADMIN — ESCITALOPRAM OXALATE 10 MG: 10 TABLET ORAL at 22:14

## 2023-12-23 RX ADMIN — METOCLOPRAMIDE 10 MG: 5 INJECTION, SOLUTION INTRAMUSCULAR; INTRAVENOUS at 11:17

## 2023-12-23 RX ADMIN — METOCLOPRAMIDE 10 MG: 5 INJECTION, SOLUTION INTRAMUSCULAR; INTRAVENOUS at 17:34

## 2023-12-23 RX ADMIN — TRIMETHOBENZAMIDE HYDROCHLORIDE 200 MG: 100 INJECTION INTRAMUSCULAR at 07:35

## 2023-12-23 RX ADMIN — ONDANSETRON 4 MG: 2 INJECTION INTRAMUSCULAR; INTRAVENOUS at 02:31

## 2023-12-23 RX ADMIN — MAGNESIUM SULFATE HEPTAHYDRATE 2 G: 40 INJECTION, SOLUTION INTRAVENOUS at 14:21

## 2023-12-23 RX ADMIN — SODIUM CHLORIDE 100 ML/HR: 0.9 INJECTION, SOLUTION INTRAVENOUS at 03:04

## 2023-12-23 RX ADMIN — PANTOPRAZOLE SODIUM 40 MG: 40 INJECTION, POWDER, FOR SOLUTION INTRAVENOUS at 22:03

## 2023-12-23 RX ADMIN — POTASSIUM CHLORIDE 20 MEQ: 14.9 INJECTION, SOLUTION INTRAVENOUS at 09:39

## 2023-12-23 NOTE — ASSESSMENT & PLAN NOTE
Presented with worsening nausea/ vomiting x 4 days. Initially had diarrhea as well but this reportedly resolved 2 days ago. Denies abdominal pain but admits to cramping.   In the setting of COVID-19.  No acute findings on CT A/P.    Plan:  Continue prn IV Zofran and reglan  IV fluids if not able to tolerate oral intake.  Can discontinue if able.  Clear liquid diet; advance as tolerated.  Follow-up on stool studies.  Replete electrolytes as needed.

## 2023-12-23 NOTE — ED PROVIDER NOTES
History  Chief Complaint   Patient presents with    Vomiting     Pt was seen here yesterday for vomiting, given fluids and zofran and discharged. Was okay all day and then about an hour ago the vomiting started again.     43-year-old female with past medical history significant for anxiety, status post hysterectomy presents w intreactable nausea, vomiting and diarrhea for one week.  Patient was in the emergency department earlier today.  She is coming back for worsening symptoms.  Denies any fevers or chills.  Reports abdominal cramping/pain.  No dysuria hematuria.  No chest pain or shortness of breath.  No palpitations.      History provided by:  Patient  Vomiting  Associated symptoms: no abdominal pain, no arthralgias, no chills, no cough, no fever and no sore throat        Prior to Admission Medications   Prescriptions Last Dose Informant Patient Reported? Taking?   ALPRAZolam (XANAX) 0.25 mg tablet   Yes No   Sig: alprazolam 0.25 mg tablet   PARoxetine (PAXIL) 10 mg tablet   Yes No   Sig: paroxetine 10 mg tablet   acetaminophen (TYLENOL) 500 mg tablet   No No   Sig: Take 2 tablets (1,000 mg total) by mouth every 6 (six) hours   butalbital-acetaminophen-caffeine (FIORICET,ESGIC) -40 mg per tablet   No No   Sig: Take 1 tablet by mouth every 4 (four) hours as needed for headaches   cephalexin (KEFLEX) 250 mg capsule   No No   Sig: Take 1 capsule (250 mg total) by mouth every 6 (six) hours for 5 days   ciprofloxacin (CIPRO) 250 mg tablet   Yes No   Sig: ciprofloxacin 250 mg tablet   dicyclomine (BENTYL) 20 mg tablet   No No   Sig: TAKE 1 TABLET (20 MG TOTAL) BY MOUTH EVERY 6 (SIX) HOURS   escitalopram (LEXAPRO) 10 mg tablet   Yes No   Sig: Take 10 mg by mouth daily   fluconazole (DIFLUCAN) 150 mg tablet   Yes No   Sig: fluconazole 150 mg tablet   fluconazole (DIFLUCAN) 200 mg tablet   Yes No   Sig: fluconazole 200 mg tablet   ibuprofen (MOTRIN) 200 mg tablet   Yes No   Sig: Take 600 mg by mouth every 6 (six)  hours as needed   metroNIDAZOLE (FLAGYL) 500 mg tablet   Yes No   Sig: metronidazole 500 mg tablet   naproxen sodium (ANAPROX) 550 mg tablet   Yes No   Sig: Take 550 mg by mouth   ondansetron (ZOFRAN) 4 mg tablet   Yes No   Sig: ondansetron HCl 4 mg tablet   ondansetron (ZOFRAN) 4 mg tablet   No No   Sig: Take 1 tablet (4 mg total) by mouth every 6 (six) hours   oxyCODONE-acetaminophen (PERCOCET) 5-325 mg per tablet   Yes No   Sig: oxycodone-acetaminophen 5 mg-325 mg tablet   pantoprazole (PROTONIX) 20 mg tablet   No No   Sig: Take 1 tablet (20 mg total) by mouth daily   traMADol (ULTRAM) 50 mg tablet   Yes No   Sig: Take 50 mg by mouth every 6 (six) hours as needed      Facility-Administered Medications: None       Past Medical History:   Diagnosis Date    Anxiety     Bladder problem     Heart murmur     Migraine     Ovarian cyst     left       Past Surgical History:   Procedure Laterality Date    ARTHROSCOPY KNEE Left     CHOLECYSTECTOMY  11/01/2023    DILATION AND CURETTAGE OF UTERUS  07/08/2014    HYSTERECTOMY  08/18/2014    Has ovaries    HYSTEROSCOPY  07/08/2014    KNEE SURGERY      LASER ABLATION OF THE CERVIX  07/07/2014    OVARIAN CYST REMOVAL      TUBAL LIGATION  07/07/2014    WISDOM TOOTH EXTRACTION      x4        Family History   Problem Relation Age of Onset    Diabetes Father     Lung cancer Maternal Grandmother      I have reviewed and agree with the history as documented.    E-Cigarette/Vaping    E-Cigarette Use Never User      E-Cigarette/Vaping Substances    Nicotine No     THC No     CBD No     Flavoring No     Other No     Unknown No      Social History     Tobacco Use    Smoking status: Former     Average packs/day: 0.4 packs/day for 17.1 years (7.5 ttl pk-yrs)     Types: Cigarettes     Start date: 12/1/2006    Smokeless tobacco: Former   Vaping Use    Vaping status: Never Used   Substance Use Topics    Alcohol use: Yes     Comment: social    Drug use: Yes     Types: Marijuana       Review of  Systems   Constitutional:  Negative for activity change, chills and fever.   HENT:  Negative for ear pain and sore throat.    Eyes:  Negative for pain, discharge and visual disturbance.   Respiratory:  Negative for cough and shortness of breath.    Cardiovascular:  Negative for chest pain and palpitations.   Gastrointestinal:  Positive for vomiting. Negative for abdominal pain.   Endocrine: Negative for cold intolerance.   Genitourinary:  Negative for dysuria, enuresis, flank pain, frequency, hematuria and menstrual problem.   Musculoskeletal:  Negative for arthralgias and back pain.   Skin:  Negative for color change and rash.   Allergic/Immunologic: Negative for environmental allergies and food allergies.   Neurological:  Negative for seizures and syncope.   Hematological:  Negative for adenopathy.   Psychiatric/Behavioral:  Negative for agitation, behavioral problems and confusion.    All other systems reviewed and are negative.      Physical Exam  Physical Exam  Vitals and nursing note reviewed.   Constitutional:       General: She is not in acute distress.     Appearance: She is well-developed.   HENT:      Head: Normocephalic and atraumatic.   Eyes:      Conjunctiva/sclera: Conjunctivae normal.   Cardiovascular:      Rate and Rhythm: Normal rate and regular rhythm.      Heart sounds: No murmur heard.  Pulmonary:      Effort: Pulmonary effort is normal. No respiratory distress.      Breath sounds: Normal breath sounds.   Abdominal:      Palpations: Abdomen is soft.      Tenderness: There is no abdominal tenderness.   Musculoskeletal:         General: No swelling.      Cervical back: Neck supple.   Skin:     General: Skin is warm and dry.      Capillary Refill: Capillary refill takes less than 2 seconds.   Neurological:      Mental Status: She is alert.   Psychiatric:         Mood and Affect: Mood normal.         Vital Signs  ED Triage Vitals [12/22/23 1957]   Temperature Pulse Respirations Blood Pressure SpO2    (!) 97.4 °F (36.3 °C) 63 (!) 30 (!) 191/96 100 %      Temp Source Heart Rate Source Patient Position - Orthostatic VS BP Location FiO2 (%)   Oral Monitor Sitting Right arm --      Pain Score       --           Vitals:    12/22/23 2130 12/22/23 2200 12/22/23 2230 12/22/23 2300   BP: 141/70 136/82 143/77 137/76   Pulse: (!) 49 (!) 53 56 55   Patient Position - Orthostatic VS:             Visual Acuity      ED Medications  Medications   potassium chloride 20 mEq IVPB (premix) (20 mEq Intravenous New Bag 12/22/23 2223)   lactated ringers bolus 1,000 mL (0 mL Intravenous Stopped 12/22/23 2347)   ondansetron (ZOFRAN) injection 4 mg (4 mg Intravenous Given 12/22/23 2054)   pantoprazole (PROTONIX) injection 40 mg (40 mg Intravenous Given 12/22/23 2052)   LORazepam (ATIVAN) injection 1 mg (1 mg Intravenous Given 12/22/23 2058)   iohexol (OMNIPAQUE) 350 MG/ML injection (MULTI-DOSE) 85 mL (85 mL Intravenous Given 12/22/23 2148)       Diagnostic Studies  Results Reviewed       Procedure Component Value Units Date/Time    COVID/FLU/RSV [[id number]]     Lab Status: No result Specimen: Nares from Nose     Clostridium difficile toxin by PCR with EIA [[id number]]     Lab Status: No result Specimen: Stool     HS Troponin I 2hr [[id number]]  (Normal) Collected: 12/22/23 2226    Lab Status: Final result Specimen: Blood from Arm, Left Updated: 12/22/23 2303     hs TnI 2hr 18 ng/L      Delta 2hr hsTnI 9 ng/L     HS Troponin I 4hr [[id number]]     Lab Status: No result Specimen: Blood     [place] draw [[id number]] Collected: 12/22/23 2028    Lab Status: Final result Specimen: Blood from Arm, Left Updated: 12/22/23 2201    Narrative:      The following orders were created for panel order [place] draw.  Procedure                               Abnormality         Status                     ---------                               -----------         ------                     Gold top on hold[[id number]]                                 Final  result                 Please view results for these tests on the individual orders.    hCG, qualitative pregnancy [[id number]]  (Normal) Collected: 12/22/23 2028    Lab Status: Final result Specimen: Blood from Arm, Left Updated: 12/22/23 2159     Preg, Serum Negative    HS Troponin 0hr (reflex protocol) [[id number]]  (Normal) Collected: 12/22/23 2028    Lab Status: Final result Specimen: Blood from Arm, Left Updated: 12/22/23 2111     hs TnI 0hr 9 ng/L     Comprehensive metabolic panel [[id number]]  (Abnormal) Collected: 12/22/23 2028    Lab Status: Final result Specimen: Blood from Arm, Left Updated: 12/22/23 2108     Sodium 138 mmol/L      Potassium 3.1 mmol/L      Chloride 106 mmol/L      CO2 16 mmol/L      ANION GAP 16 mmol/L      BUN 9 mg/dL      Creatinine 0.84 mg/dL      Glucose 107 mg/dL      Calcium 10.0 mg/dL      AST 28 U/L      ALT 22 U/L      Alkaline Phosphatase 67 U/L      Total Protein 7.9 g/dL      Albumin 5.0 g/dL      Total Bilirubin 1.56 mg/dL      eGFR 85 ml/min/1.73sq m     Narrative:      [employer] guidelines for Chronic Kidney Disease (CKD):     Stage 1 with normal or high GFR (GFR > 90 mL/min/1.73 square meters)    Stage 2 Mild CKD (GFR = 60-89 mL/min/1.73 square meters)    Stage 3A Moderate CKD (GFR = 45-59 mL/min/1.73 square meters)    Stage 3B Moderate CKD (GFR = 30-44 mL/min/1.73 square meters)    Stage 4 Severe CKD (GFR = 15-29 mL/min/1.73 square meters)    Stage 5 End Stage CKD (GFR <15 mL/min/1.73 square meters)  Note: GFR calculation is accurate only with a steady state creatinine    D-dimer, quantitative [[id number]]  (Normal) Collected: 12/22/23 2028    Lab Status: Final result Specimen: Blood from Arm, Left Updated: 12/22/23 2101     D-Dimer, Quant 0.31 ug/ml FEU     CBC and differential [[id number]] Collected: 12/22/23 2028    Lab Status: Final result Specimen: Blood from Arm, Left Updated: 12/22/23 2049     WBC 7.78 Thousand/uL      RBC 4.29 Million/uL       Hemoglobin 13.9 g/dL      Hematocrit 40.1 %      MCV 94 fL      MCH 32.4 pg      MCHC 34.7 g/dL      RDW 12.7 %      MPV 10.1 fL      Platelets 323 Thousands/uL      nRBC 0 /100 WBCs      Neutrophils Relative 74 %      Immat GRANS % 0 %      Lymphocytes Relative 16 %      Monocytes Relative 10 %      Eosinophils Relative 0 %      Basophils Relative 0 %      Neutrophils Absolute 5.64 Thousands/µL      Immature Grans Absolute 0.02 Thousand/uL      Lymphocytes Absolute 1.27 Thousands/µL      Monocytes Absolute 0.81 Thousand/µL      Eosinophils Absolute 0.01 Thousand/µL      Basophils Absolute 0.03 Thousands/µL     Fingerstick Glucose (POCT) [[id number]]  (Normal) Collected: 12/22/23 2027    Lab Status: Final result Updated: 12/22/23 2036     POC Glucose 105 mg/dl                    CT abdomen pelvis with contrast   Final Result by Roberto Dent MD (12/22 2255)      No acute intra-abdominal/pelvic abnormalities noted with multiple ancillary findings detailed above.      Workstation performed: [id number]         XR chest 1 view portable    (Results Pending)              Procedures  Procedures         ED Course         Patient presents back to the emergency department with intractable nausea, vomiting and diarrhea.  Patient has hypokalemia of 3.1.  IV potassium ordered.  Patient is nauseous despite IV Zofran.  Will admit patient under Dr. Abernathy for further management and care.                      SBIRT 20yo+      Flowsheet Row Most Recent Value   Initial Alcohol Screen: US AUDIT-C     1. How often do you have a drink containing alcohol? 0 Filed at: 12/22/2023 2029   2. How many drinks containing alcohol do you have on a typical day you are drinking?  0 Filed at: 12/22/2023 2029   3a. Male UNDER 65: How often do you have five or more drinks on one occasion? 0 Filed at: 12/22/2023 2029   3b. FEMALE Any Age, or MALE 65+: How often do you have 4 or more drinks on one occassion? 0 Filed at: 12/22/2023 2029   Audit-C Score 0  Filed at: 12/22/2023 2029   FRANCES: How many times in the past year have you...    Used an illegal drug or used a prescription medication for non-medical reasons? Never Filed at: 12/22/2023 2029                      Medical Decision Making  Patient presents back to the emergency department with intractable nausea, vomiting and diarrhea.  Patient has hypokalemia of 3.1.  IV potassium ordered.  Patient is nauseous despite IV Zofran.  Will admit patient under Dr. Abernathy for further management and care.    Amount and/or Complexity of Data Reviewed  Labs: ordered.  Radiology: ordered.    Risk  Prescription drug management.  Decision regarding hospitalization.             Disposition  Final diagnoses:   Vomiting   Diarrhea   Dehydration     Time reflects when diagnosis was documented in both MDM as applicable and the Disposition within this note       Time User Action Codes Description Comment    12/23/2023 12:07 AM Alok Ambrose Add [R11.10] Vomiting     12/23/2023 12:07 AM Alok Ambrose Add [R19.7] Diarrhea     12/23/2023 12:07 AM Alok Ambrose Add [E86.0] Dehydration           ED Disposition       ED Disposition   Admit    Condition   Stable    Date/Time   Sat Dec 23, 2023 0007    Comment   Case was discussed with THEODORE and the patient's admission status was agreed to be Admission Status: inpatient status to the service of Dr. Abernathy .               Follow-up Information    None         Patient's Medications   Discharge Prescriptions    No medications on file       No discharge procedures on file.    PDMP Review         Value Time User    PDMP Reviewed  Yes 12/21/2023 11:54 PM Grover Kapoor MD            ED Provider  Electronically Signed by             Alok Ambrose DO  12/23/23 0011

## 2023-12-23 NOTE — PLAN OF CARE
Problem: PAIN - ADULT  Goal: Verbalizes/displays adequate comfort level or baseline comfort level  Description: Interventions:  - Encourage patient to monitor pain and request assistance  - Assess pain using appropriate pain scale  - Administer analgesics based on type and severity of pain and evaluate response  - Implement non-pharmacological measures as appropriate and evaluate response  - Consider cultural and social influences on pain and pain management  - Notify physician/advanced practitioner if interventions unsuccessful or patient reports new pain  Outcome: Progressing     Problem: INFECTION - ADULT  Goal: Absence or prevention of progression during hospitalization  Description: INTERVENTIONS:  - Assess and monitor for signs and symptoms of infection  - Monitor lab/diagnostic results  - Monitor all insertion sites, i.e. indwelling lines, tubes, and drains  - Monitor endotracheal if appropriate and nasal secretions for changes in amount and color  - [place] appropriate cooling/warming therapies per order  - Administer medications as ordered  - Instruct and encourage patient and family to use good hand hygiene technique  - Identify and instruct in appropriate isolation precautions for identified infection/condition  Outcome: Progressing  Goal: Absence of fever/infection during neutropenic period  Description: INTERVENTIONS:  - Monitor WBC    Outcome: Progressing     Problem: SAFETY ADULT  Goal: Patient will remain free of falls  Description: INTERVENTIONS:  - Educate patient/family on patient safety including physical limitations  - Instruct patient to call for assistance with activity   - Consult OT/PT to assist with strengthening/mobility   - Keep Call bell within reach  - Keep bed low and locked with side rails adjusted as appropriate  - Keep care items and personal belongings within reach  - Initiate and maintain comfort rounds  - Make Fall Risk Sign visible to staff  - Offer Toileting every  Hours,  in advance of need  - Initiate/Maintain alarm  - Obtain necessary fall risk management equipment:  - Apply yellow socks and bracelet for high fall risk patients  - Consider moving patient to room near nurses station  Outcome: Progressing  Goal: Maintain or return to baseline ADL function  Description: INTERVENTIONS:  -  Assess patient's ability to carry out ADLs; assess patient's baseline for ADL function and identify physical deficits which impact ability to perform ADLs (bathing, care of mouth/teeth, toileting, grooming, dressing, etc.)  - Assess/evaluate cause of self-care deficits   - Assess range of motion  - Assess patient's mobility; develop plan if impaired  - Assess patient's need for assistive devices and provide as appropriate  - Encourage maximum independence but intervene and supervise when necessary  - Involve family in performance of ADLs  - Assess for home care needs following discharge   - Consider OT consult to assist with ADL evaluation and planning for discharge  - Provide patient education as appropriate  Outcome: Progressing  Goal: Maintains/Returns to pre admission functional level  Description: INTERVENTIONS:  - Perform AM-PAC 6 Click Basic Mobility/ Daily Activity assessment daily.  - Set and communicate daily mobility goal to care team and patient/family/caregiver.   - Collaborate with rehabilitation services on mobility goals if consulted  - Perform Range of Motion  times a day.  - Reposition patient every  hours.  - Dangle patient  times a day  - Stand patient  times a day  - Ambulate patient times a day  - Out of bed to chair  times a day   - Out of bed for meals  times a day  - Out of bed for toileting  - Record patient progress and toleration of activity level   Outcome: Progressing     Problem: DISCHARGE PLANNING  Goal: Discharge to home or other facility with appropriate resources  Description: INTERVENTIONS:  - Identify barriers to discharge w/patient and caregiver  - Arrange for  needed discharge resources and transportation as appropriate  - Identify discharge learning needs (meds, wound care, etc.)  - Arrange for interpretive services to assist at discharge as needed  - Refer to Case Management Department for coordinating discharge planning if the patient needs post-hospital services based on physician/advanced practitioner order or complex needs related to functional status, cognitive ability, or social support system  Outcome: Progressing     Problem: Knowledge Deficit  Goal: Patient/family/caregiver demonstrates understanding of disease process, treatment plan, medications, and discharge instructions  Description: Complete learning assessment and assess knowledge base.  Interventions:  - Provide teaching at level of understanding  - Provide teaching via preferred learning methods  Outcome: Progressing

## 2023-12-23 NOTE — H&P
[facility]  H&P  Name: Suzanna Guerrero 43 y.o. female I MRN: [id number]  Unit/Bed#: W -01 I Date of Admission: 12/22/2023   Date of Service: 12/23/2023 I Hospital Day: 1      Assessment/Plan   * Intractable vomiting with nausea  Assessment & Plan  Presented with worsening nausea/ vomiting x 4 days. Initially had diarrhea as well but this reportedly resolved 2 days ago. Denies abdominal pain but admits to cramping.   In the setting of COVID-19.  No acute findings on CT A/P.  Continue prn IV Zofran.  IV fluids.   Clear liquid diet; advance as tolerated.  Follow-up on stool studies.   Bicarb 16 with AG 16.   Check VBG.  Repeat CMP in AM.     Hypokalemia  Assessment & Plan  POA with K 3.1.  Received 20 mEq of IV potassium chloride in the ED.   Repeat BMP in AM.   Obtain magnesium level.       COVID-19  Assessment & Plan  COVID-19 positive.  Not requiring any oxygen but does admit to some SOB.   No acute findings noted on CXR; follow-up on official report in AM.   D-dimer negative.   Continue to monitor pulse ox.   Supportive care.    Hyperlipidemia  Assessment & Plan  Resume statin once tolerating PO intake.    Anxiety  Assessment & Plan  Continue Lexapro.         VTE Pharmacologic Prophylaxis: VTE Score: 3 Moderate Risk (Score 3-4) - Pharmacological DVT Prophylaxis Ordered: enoxaparin (Lovenox).  Code Status: level 1- full code  Discussion with family:  not at this time.     Anticipated Length of Stay: Patient will be admitted on an observation basis with an anticipated length of stay of less than 2 midnights secondary to nausea/ vomiting.    Total Time Spent on Date of Encounter in care of patient:  This time was spent on one or more of the following: performing physical exam; counseling and coordination of care; obtaining or reviewing history; documenting in the medical record; reviewing/ordering tests, medications or procedures; communicating with other healthcare professionals and  discussing with patient's family/caregivers.    Chief Complaint: nausea/ vomiting    History of Present Illness:  Suzanna Guerrero is a 43 y.o. female with a PMH of hyperlipidemia, anxiety, migraines, hysterectomy, cholecystectomy who presents with nausea/ vomiting. Patient presented to the ED early yesterday morning with 3 days of worsening nausea/ vomiting. She received IV fluids and IV Zofran and was discharged home after her nausea had improved. Patient reports that upon returning home yesterday her symptoms returned and she has be unable to tolerate any PO intake, including water. She denies abdominal pain but admits to cramping. She notes that initially she had diarrhea as well but this resolved 2 days ago. She admits to feeling somewhat SOB but attributes this to her anxiety and inability to take her Lexapro the past few days. She denies fevers/ chills, chest pain, cough, congestion or urinary complaints. She admits to feeling unsteady on her feet but denies dizziness. She admits to sick contacts lately at work.     Review of Systems:  Review of Systems   Constitutional:  Positive for appetite change. Negative for chills and fever.   Respiratory:  Positive for shortness of breath. Negative for cough.    Cardiovascular:  Negative for chest pain and leg swelling.   Gastrointestinal:  Positive for abdominal pain (cramping), diarrhea, nausea and vomiting. Negative for blood in stool.   Genitourinary:  Negative for difficulty urinating, dysuria and frequency.   All other systems reviewed and are negative.      Past Medical and Surgical History:   Past Medical History:   Diagnosis Date    Anxiety     Bladder problem     Heart murmur     Hyperlipidemia     Migraine     Ovarian cyst     left       Past Surgical History:   Procedure Laterality Date    ARTHROSCOPY KNEE Left     CHOLECYSTECTOMY  11/01/2023    DILATION AND CURETTAGE OF UTERUS  07/08/2014    HYSTERECTOMY  08/18/2014    Has ovaries    HYSTEROSCOPY  07/08/2014     KNEE SURGERY      LASER ABLATION OF THE CERVIX  07/07/2014    OVARIAN CYST REMOVAL      TUBAL LIGATION  07/07/2014    WISDOM TOOTH EXTRACTION      x4        Meds/Allergies:  Prior to Admission medications    Medication Sig Start Date End Date Taking? Authorizing Provider   acetaminophen (TYLENOL) 500 mg tablet Take 2 tablets (1,000 mg total) by mouth every 6 (six) hours 9/19/18   Jenn Rodriges PA-C   ALPRAZolam (XANAX) 0.25 mg tablet alprazolam 0.25 mg tablet 9/19/18   Historical Provider, MD   butalbital-acetaminophen-caffeine (FIORICET,ESGIC) -40 mg per tablet Take 1 tablet by mouth every 4 (four) hours as needed for headaches 9/11/18   Jennifer Kirkpatrick,    cephalexin (KEFLEX) 250 mg capsule Take 1 capsule (250 mg total) by mouth every 6 (six) hours for 5 days 12/22/23 12/27/23  Rolan Rapp, DO   ciprofloxacin (CIPRO) 250 mg tablet ciprofloxacin 250 mg tablet    Historical Provider, MD   dicyclomine (BENTYL) 20 mg tablet TAKE 1 TABLET (20 MG TOTAL) BY MOUTH EVERY 6 (SIX) HOURS 1/17/22   Bernadine Meza PA-C   escitalopram (LEXAPRO) 10 mg tablet Take 10 mg by mouth daily    Historical Provider, MD   fluconazole (DIFLUCAN) 150 mg tablet fluconazole 150 mg tablet    Historical Provider, MD   fluconazole (DIFLUCAN) 200 mg tablet fluconazole 200 mg tablet    Historical Provider, MD   ibuprofen (MOTRIN) 200 mg tablet Take 600 mg by mouth every 6 (six) hours as needed 2/7/19   Historical Provider, MD   metroNIDAZOLE (FLAGYL) 500 mg tablet metronidazole 500 mg tablet    Historical Provider, MD   naproxen sodium (ANAPROX) 550 mg tablet Take 550 mg by mouth    Historical Provider, MD   ondansetron (ZOFRAN) 4 mg tablet ondansetron HCl 4 mg tablet    Historical Provider, MD   ondansetron (ZOFRAN) 4 mg tablet Take 1 tablet (4 mg total) by mouth every 6 (six) hours 12/22/23   Rolan Rapp,    oxyCODONE-acetaminophen (PERCOCET) 5-325 mg per tablet oxycodone-acetaminophen 5 mg-325 mg  "tablet    Historical Provider, MD   pantoprazole (PROTONIX) 20 mg tablet Take 1 tablet (20 mg total) by mouth daily 6/3/20   Rema Landry PA-C   PARoxetine (PAXIL) 10 mg tablet paroxetine 10 mg tablet    Historical Provider, MD   traMADol (ULTRAM) 50 mg tablet Take 50 mg by mouth every 6 (six) hours as needed    Historical Provider, MD     I have reviewed home medications with a medical source (PCP, Pharmacy, other).    Allergies:   Allergies   Allergen Reactions    Chlorhexidine Gluconate Rash     2 x post surgery developed rash on prep area    Medical Tape Rash       Social History:  Marital Status: /Civil Union   Occupation:   Patient Pre-hospital Living Situation: Home  Patient Pre-hospital Level of Mobility: walks  Patient Pre-hospital Diet Restrictions:   Substance Use History:   Social History     Substance and Sexual Activity   Alcohol Use Yes    Comment: social     Social History     Tobacco Use   Smoking Status Former    Average packs/day: 0.4 packs/day for 17.1 years (7.5 ttl pk-yrs)    Types: Cigarettes    Start date: 12/1/2006   Smokeless Tobacco Former     Social History     Substance and Sexual Activity   Drug Use Yes    Types: Marijuana       Family History:  Family History   Problem Relation Age of Onset    Diabetes Father     Lung cancer Maternal Grandmother        Physical Exam:     Vitals:   Blood Pressure: 142/72 (12/23/23 0156)  Pulse: 60 (12/23/23 0156)  Temperature: 98 °F (36.7 °C) (12/23/23 0156)  Temp Source: Oral (12/23/23 0156)  Respirations: 20 (12/23/23 0156)  Height: 5' 4\" (162.6 cm) (12/23/23 0156)  Weight - Scale: 70.3 kg (155 lb) (12/23/23 0156)  SpO2: 100 % (12/23/23 0156)    Physical Exam  Vitals and nursing note reviewed.   Constitutional:       General: She is not in acute distress.     Appearance: She is not diaphoretic.   HENT:      Head: Normocephalic and atraumatic.   Eyes:      Conjunctiva/sclera: Conjunctivae normal.   Cardiovascular:      Rate and Rhythm: " Normal rate and regular rhythm.   Pulmonary:      Effort: Pulmonary effort is normal. No respiratory distress.      Breath sounds: Normal breath sounds.   Abdominal:      General: Bowel sounds are normal.      Palpations: Abdomen is soft.      Tenderness: There is no abdominal tenderness.   Musculoskeletal:      Right lower leg: No edema.      Left lower leg: No edema.   Skin:     General: Skin is warm and dry.      Coloration: Skin is not pale.   Neurological:      Mental Status: She is alert and oriented to person, place, and time.   Psychiatric:         Mood and Affect: Mood normal.          Additional Data:     Lab Results:  Results from last 7 days   Lab Units 12/22/23 2028   WBC Thousand/uL 7.78   HEMOGLOBIN g/dL 13.9   HEMATOCRIT % 40.1   PLATELETS Thousands/uL 323   NEUTROS PCT % 74   LYMPHS PCT % 16   MONOS PCT % 10   EOS PCT % 0     Results from last 7 days   Lab Units 12/22/23 2028   SODIUM mmol/L 138   POTASSIUM mmol/L 3.1*   CHLORIDE mmol/L 106   CO2 mmol/L 16*   BUN mg/dL 9   CREATININE mg/dL 0.84   ANION GAP mmol/L 16   CALCIUM mg/dL 10.0   ALBUMIN g/dL 5.0   TOTAL BILIRUBIN mg/dL 1.56*   ALK PHOS U/L 67   ALT U/L 22   AST U/L 28   GLUCOSE RANDOM mg/dL 107         Results from last 7 days   Lab Units 12/22/23 2027   POC GLUCOSE mg/dl 105               Lines/Drains:  Invasive Devices       Peripheral Intravenous Line  Duration             Peripheral IV 12/22/23 Left;Proximal;Ventral (anterior) Forearm <1 day                        Imaging: Reviewed radiology reports from this admission including: abdominal/pelvic CT  CT abdomen pelvis with contrast   Final Result by Roberto Dent MD (12/22 2255)      No acute intra-abdominal/pelvic abnormalities noted with multiple ancillary findings detailed above.      Workstation performed: [id number]         XR chest 1 view portable    (Results Pending)       EKG and Other Studies Reviewed on Admission:   EKG: NSR. HR 51.    ** Please Note: This note has been  constructed using a voice recognition system. **

## 2023-12-23 NOTE — ASSESSMENT & PLAN NOTE
POA with K 3.1.  Received 20 mEq of IV potassium chloride in the ED.   Repeat BMP in AM.   Obtain magnesium level.

## 2023-12-23 NOTE — ASSESSMENT & PLAN NOTE
Presented with worsening nausea/ vomiting x 4 days. Initially had diarrhea as well but this reportedly resolved 2 days ago. Denies abdominal pain but admits to cramping.   In the setting of COVID-19.  No acute findings on CT A/P.  Continue prn IV Zofran.  IV fluids.   Clear liquid diet; advance as tolerated.  Follow-up on stool studies.   Bicarb 16 with AG 16.   Check VBG.  Repeat CMP in AM.

## 2023-12-23 NOTE — ASSESSMENT & PLAN NOTE
COVID-19 positive.  Not requiring any oxygen but does admit to some SOB.   No acute findings noted on CXR; follow-up on official report in AM.   D-dimer negative.   Continue to monitor pulse ox.   Supportive care.

## 2023-12-24 VITALS
OXYGEN SATURATION: 96 % | HEIGHT: 64 IN | WEIGHT: 155 LBS | SYSTOLIC BLOOD PRESSURE: 106 MMHG | RESPIRATION RATE: 16 BRPM | HEART RATE: 69 BPM | TEMPERATURE: 97.7 F | BODY MASS INDEX: 26.46 KG/M2 | DIASTOLIC BLOOD PRESSURE: 69 MMHG

## 2023-12-24 LAB
ANION GAP SERPL CALCULATED.3IONS-SCNC: 9 MMOL/L
ATRIAL RATE: 51 BPM
BACTERIA UR QL AUTO: ABNORMAL /HPF
BASOPHILS # BLD AUTO: 0.04 THOUSANDS/ÂΜL (ref 0–0.1)
BASOPHILS NFR BLD AUTO: 1 % (ref 0–1)
BILIRUB UR QL STRIP: NEGATIVE
BUN SERPL-MCNC: 5 MG/DL (ref 5–25)
CALCIUM SERPL-MCNC: 8.7 MG/DL (ref 8.4–10.2)
CHLORIDE SERPL-SCNC: 107 MMOL/L (ref 96–108)
CLARITY UR: ABNORMAL
CO2 SERPL-SCNC: 23 MMOL/L (ref 21–32)
COLOR UR: YELLOW
CREAT SERPL-MCNC: 0.73 MG/DL (ref 0.6–1.3)
CRP SERPL QL: 2.1 MG/L
EOSINOPHIL # BLD AUTO: 0.07 THOUSAND/ÂΜL (ref 0–0.61)
EOSINOPHIL NFR BLD AUTO: 1 % (ref 0–6)
ERYTHROCYTE [DISTWIDTH] IN BLOOD BY AUTOMATED COUNT: 12.3 % (ref 11.6–15.1)
GFR SERPL CREATININE-BSD FRML MDRD: 101 ML/MIN/1.73SQ M
GLUCOSE SERPL-MCNC: 99 MG/DL (ref 65–140)
GLUCOSE UR STRIP-MCNC: NEGATIVE MG/DL
HCT VFR BLD AUTO: 38.4 % (ref 34.8–46.1)
HGB BLD-MCNC: 13.8 G/DL (ref 11.5–15.4)
HGB UR QL STRIP.AUTO: ABNORMAL
IMM GRANULOCYTES # BLD AUTO: 0.02 THOUSAND/UL (ref 0–0.2)
IMM GRANULOCYTES NFR BLD AUTO: 0 % (ref 0–2)
KETONES UR STRIP-MCNC: ABNORMAL MG/DL
LEUKOCYTE ESTERASE UR QL STRIP: NEGATIVE
LYMPHOCYTES # BLD AUTO: 1.28 THOUSANDS/ÂΜL (ref 0.6–4.47)
LYMPHOCYTES NFR BLD AUTO: 21 % (ref 14–44)
MAGNESIUM SERPL-MCNC: 2.2 MG/DL (ref 1.9–2.7)
MCH RBC QN AUTO: 33.1 PG (ref 26.8–34.3)
MCHC RBC AUTO-ENTMCNC: 35.9 G/DL (ref 31.4–37.4)
MCV RBC AUTO: 92 FL (ref 82–98)
MONOCYTES # BLD AUTO: 0.99 THOUSAND/ÂΜL (ref 0.17–1.22)
MONOCYTES NFR BLD AUTO: 16 % (ref 4–12)
MUCOUS THREADS UR QL AUTO: ABNORMAL
NEUTROPHILS # BLD AUTO: 3.65 THOUSANDS/ÂΜL (ref 1.85–7.62)
NEUTS SEG NFR BLD AUTO: 61 % (ref 43–75)
NITRITE UR QL STRIP: NEGATIVE
NON-SQ EPI CELLS URNS QL MICRO: ABNORMAL /HPF
NRBC BLD AUTO-RTO: 0 /100 WBCS
P AXIS: 51 DEGREES
PH UR STRIP.AUTO: 6.5 [PH]
PHOSPHATE SERPL-MCNC: 2.9 MG/DL (ref 2.7–4.5)
PLATELET # BLD AUTO: 277 THOUSANDS/UL (ref 149–390)
PMV BLD AUTO: 9.8 FL (ref 8.9–12.7)
POTASSIUM SERPL-SCNC: 2.8 MMOL/L (ref 3.5–5.3)
PR INTERVAL: 154 MS
PROT UR STRIP-MCNC: ABNORMAL MG/DL
QRS AXIS: 53 DEGREES
QRSD INTERVAL: 90 MS
QT INTERVAL: 462 MS
QTC INTERVAL: 425 MS
RBC # BLD AUTO: 4.17 MILLION/UL (ref 3.81–5.12)
RBC #/AREA URNS AUTO: ABNORMAL /HPF
SODIUM SERPL-SCNC: 139 MMOL/L (ref 135–147)
SP GR UR STRIP.AUTO: 1.02 (ref 1–1.03)
T WAVE AXIS: 38 DEGREES
UROBILINOGEN UR STRIP-ACNC: <2 MG/DL
VENTRICULAR RATE: 51 BPM
WBC # BLD AUTO: 6.05 THOUSAND/UL (ref 4.31–10.16)
WBC #/AREA URNS AUTO: ABNORMAL /HPF

## 2023-12-24 PROCEDURE — C9113 INJ PANTOPRAZOLE SODIUM, VIA: HCPCS | Performed by: INTERNAL MEDICINE

## 2023-12-24 PROCEDURE — 83735 ASSAY OF MAGNESIUM: CPT

## 2023-12-24 PROCEDURE — 99239 HOSP IP/OBS DSCHRG MGMT >30: CPT | Performed by: INTERNAL MEDICINE

## 2023-12-24 PROCEDURE — 85025 COMPLETE CBC W/AUTO DIFF WBC: CPT

## 2023-12-24 PROCEDURE — 86140 C-REACTIVE PROTEIN: CPT | Performed by: INTERNAL MEDICINE

## 2023-12-24 PROCEDURE — 80048 BASIC METABOLIC PNL TOTAL CA: CPT

## 2023-12-24 PROCEDURE — 81001 URINALYSIS AUTO W/SCOPE: CPT

## 2023-12-24 PROCEDURE — 84100 ASSAY OF PHOSPHORUS: CPT | Performed by: INTERNAL MEDICINE

## 2023-12-24 RX ORDER — METOCLOPRAMIDE 10 MG/1
10 TABLET ORAL 3 TIMES DAILY PRN
Qty: 10 TABLET | Refills: 0 | Status: SHIPPED | OUTPATIENT
Start: 2023-12-24

## 2023-12-24 RX ORDER — POTASSIUM CHLORIDE 20 MEQ/1
40 TABLET, EXTENDED RELEASE ORAL ONCE
Status: COMPLETED | OUTPATIENT
Start: 2023-12-24 | End: 2023-12-24

## 2023-12-24 RX ORDER — POTASSIUM CHLORIDE 14.9 MG/ML
20 INJECTION INTRAVENOUS ONCE
Qty: 100 ML | Refills: 0 | Status: COMPLETED | OUTPATIENT
Start: 2023-12-24 | End: 2023-12-24

## 2023-12-24 RX ORDER — ONDANSETRON HYDROCHLORIDE 8 MG/1
8 TABLET, FILM COATED ORAL EVERY 8 HOURS PRN
Qty: 10 TABLET | Refills: 0 | Status: SHIPPED | OUTPATIENT
Start: 2023-12-24

## 2023-12-24 RX ADMIN — ESCITALOPRAM OXALATE 10 MG: 10 TABLET ORAL at 09:41

## 2023-12-24 RX ADMIN — POTASSIUM CHLORIDE 20 MEQ: 14.9 INJECTION, SOLUTION INTRAVENOUS at 06:37

## 2023-12-24 RX ADMIN — POTASSIUM CHLORIDE 20 MEQ: 14.9 INJECTION, SOLUTION INTRAVENOUS at 09:45

## 2023-12-24 RX ADMIN — POTASSIUM CHLORIDE 40 MEQ: 1500 TABLET, EXTENDED RELEASE ORAL at 06:37

## 2023-12-24 RX ADMIN — DEXTROSE AND SODIUM CHLORIDE 100 ML/HR: 5; .9 INJECTION, SOLUTION INTRAVENOUS at 05:02

## 2023-12-24 RX ADMIN — ENOXAPARIN SODIUM 30 MG: 30 INJECTION SUBCUTANEOUS at 09:41

## 2023-12-24 RX ADMIN — METOCLOPRAMIDE 10 MG: 5 INJECTION, SOLUTION INTRAMUSCULAR; INTRAVENOUS at 08:42

## 2023-12-24 RX ADMIN — ONDANSETRON 8 MG: 2 INJECTION INTRAMUSCULAR; INTRAVENOUS at 05:00

## 2023-12-24 RX ADMIN — PANTOPRAZOLE SODIUM 40 MG: 40 INJECTION, POWDER, FOR SOLUTION INTRAVENOUS at 09:45

## 2023-12-24 NOTE — PLAN OF CARE
Problem: PAIN - ADULT  Goal: Verbalizes/displays adequate comfort level or baseline comfort level  Description: Interventions:  - Encourage patient to monitor pain and request assistance  - Assess pain using appropriate pain scale  - Administer analgesics based on type and severity of pain and evaluate response  - Implement non-pharmacological measures as appropriate and evaluate response  - Consider cultural and social influences on pain and pain management  - Notify physician/advanced practitioner if interventions unsuccessful or patient reports new pain  Outcome: Progressing     Problem: INFECTION - ADULT  Goal: Absence or prevention of progression during hospitalization  Description: INTERVENTIONS:  - Assess and monitor for signs and symptoms of infection  - Monitor lab/diagnostic results  - Monitor all insertion sites, i.e. indwelling lines, tubes, and drains  - Monitor endotracheal if appropriate and nasal secretions for changes in amount and color  - [place] appropriate cooling/warming therapies per order  - Administer medications as ordered  - Instruct and encourage patient and family to use good hand hygiene technique  - Identify and instruct in appropriate isolation precautions for identified infection/condition  Outcome: Progressing  Goal: Absence of fever/infection during neutropenic period  Description: INTERVENTIONS:  - Monitor WBC    Outcome: Progressing     Problem: SAFETY ADULT  Goal: Patient will remain free of falls  Description: INTERVENTIONS:  - Educate patient/family on patient safety including physical limitations  - Instruct patient to call for assistance with activity   - Consult OT/PT to assist with strengthening/mobility   - Keep Call bell within reach  - Keep bed low and locked with side rails adjusted as appropriate  - Keep care items and personal belongings within reach  - Initiate and maintain comfort rounds  - Make Fall Risk Sign visible to staff  - Offer Toileting every  Hours,  in advance of need  - Initiate/Maintain alarm  - Obtain necessary fall risk management equipment:  - Apply yellow socks and bracelet for high fall risk patients  - Consider moving patient to room near nurses station  Outcome: Progressing  Goal: Maintain or return to baseline ADL function  Description: INTERVENTIONS:  -  Assess patient's ability to carry out ADLs; assess patient's baseline for ADL function and identify physical deficits which impact ability to perform ADLs (bathing, care of mouth/teeth, toileting, grooming, dressing, etc.)  - Assess/evaluate cause of self-care deficits   - Assess range of motion  - Assess patient's mobility; develop plan if impaired  - Assess patient's need for assistive devices and provide as appropriate  - Encourage maximum independence but intervene and supervise when necessary  - Involve family in performance of ADLs  - Assess for home care needs following discharge   - Consider OT consult to assist with ADL evaluation and planning for discharge  - Provide patient education as appropriate  Outcome: Progressing  Goal: Maintains/Returns to pre admission functional level  Description: INTERVENTIONS:  - Perform AM-PAC 6 Click Basic Mobility/ Daily Activity assessment daily.  - Set and communicate daily mobility goal to care team and patient/family/caregiver.   - Collaborate with rehabilitation services on mobility goals if consulted  - Perform Range of Motion  times a day.  - Reposition patient every  hours.  - Dangle patient  times a day  - Stand patient  times a day  - Ambulate patient times a day  - Out of bed to chair  times a day   - Out of bed for meals  times a day  - Out of bed for toileting  - Record patient progress and toleration of activity level   Outcome: Progressing     Problem: DISCHARGE PLANNING  Goal: Discharge to home or other facility with appropriate resources  Description: INTERVENTIONS:  - Identify barriers to discharge w/patient and caregiver  - Arrange for  needed discharge resources and transportation as appropriate  - Identify discharge learning needs (meds, wound care, etc.)  - Arrange for interpretive services to assist at discharge as needed  - Refer to Case Management Department for coordinating discharge planning if the patient needs post-hospital services based on physician/advanced practitioner order or complex needs related to functional status, cognitive ability, or social support system  Outcome: Progressing     Problem: Knowledge Deficit  Goal: Patient/family/caregiver demonstrates understanding of disease process, treatment plan, medications, and discharge instructions  Description: Complete learning assessment and assess knowledge base.  Interventions:  - Provide teaching at level of understanding  - Provide teaching via preferred learning methods  Outcome: Progressing

## 2023-12-24 NOTE — DISCHARGE SUMMARY
[facility]  Discharge- Suzanna Guerrero 1980, 43 y.o. female MRN: [id number]  Unit/Bed#: W -01 Encounter: [id number]  Primary Care Provider: Alejandra Esparza MD   Date and time admitted to hospital: 12/22/2023  8:09 PM    * Intractable vomiting with nausea  Assessment & Plan  Presented with worsening nausea/ vomiting x 4 days. Initially had diarrhea as well but this reportedly resolved 2 days ago. Denies abdominal pain but admits to cramping.   In the setting of COVID-19.  No acute findings on CT A/P.    Plan:  Continue prn IV Zofran and reglan  IV fluids if not able to tolerate oral intake.  Can discontinue if able.  Clear liquid diet; advance as tolerated.  Follow-up on stool studies.  Replete electrolytes as needed.    Hyperlipidemia  Assessment & Plan  Resume statin once tolerating PO intake.    Anxiety  Assessment & Plan  Continue Lexapro.    Hypokalemia  Assessment & Plan  In the setting of nausea vomiting, poor oral intake.  Replete as needed.      COVID-19  Assessment & Plan  COVID-19 positive.  Not requiring any oxygen but does admit to some SOB.   No acute findings noted on CXR; follow-up on official report in AM.   D-dimer negative.   Continue to monitor pulse ox.   Supportive care.      Medical Problems       Resolved Problems  Date Reviewed: 12/24/2023   None       Discharging Resident: Jimmy Brock MD  Discharging Attending: Drew Parish MD  PCP: Alejandra Esparza MD  Admission Date:   Admission Orders (From admission, onward)       Ordered        12/23/23 0020  Place in Observation  Once            12/23/23 0008  INPATIENT ADMISSION  Once,   Status:  Canceled                          Discharge Date: 12/24/23    Consultations During Hospital Stay:  None    Procedures Performed:   None    Significant Findings / Test Results:   COVID positive.  CT abdomen pelvis with contrast   Final Result by Roberto Dent MD (12/22 9265)      No acute intra-abdominal/pelvic  "abnormalities noted with multiple ancillary findings detailed above.      Workstation performed: [id number]         XR chest 1 view portable   Final Result by Roberto Dent MD (12/24 0131)      No acute cardiopulmonary disease.                  Workstation performed: [id number]               Incidental Findings:   None    Test Results Pending at Discharge (will require follow up):  Stool study     Outpatient Tests Requested:  Repeat BMP with 1 week of discharge    Complications: Hypokalemia    Reason for Admission: Intractable vomiting with nausea, diarrhea in setting of COVID infection    Hospital Course:   Suzanna Guerrero is a 43 y.o. female patient with past medical history of hyperlipidemia, anxiety, fibroid with hysterectomy, cholecystectomy who originally presented to the hospital on 12/22/2023 due to intractable nausea and vomiting, diarrhea since 12/20/2023.  She was found to have a COVID-19 infection in ED. No O2 requirements. Denied chest pain, SOB.  She was found to have hypokalemia and hypomagnesia which has been repleted. She was managed supportively IVF, Zofran/Reglan for nausea/vomiting.  Today patient was able to tolerate with oral intake.  Nausea/vomiting significantly improved.  Patient is medically stable to be discharged today.      Please see above list of diagnoses and related plan for additional information.     Condition at Discharge: good    Discharge Day Visit / Exam:   Subjective: Patient was seen and examined at bedside.  OOA x 4, NAD.  Reported nausea and vomiting getting better.  No more diarrhea.  Was able to tolerate with oral intake.  Reported to have some sinus congestion but no tenderness.  Denied any chest pain, shortness of breath, abdominal pain,.  Vitals: Blood Pressure: 106/69 (12/24/23 0753)  Pulse: 69 (12/24/23 0753)  Temperature: 97.7 °F (36.5 °C) (12/24/23 0753)  Temp Source: Oral (12/23/23 0156)  Respirations: 16 (12/24/23 0753)  Height: 5' 4\" (162.6 cm) (12/23/23 " 0156)  Weight - Scale: 70.3 kg (155 lb) (12/23/23 0156)  SpO2: 96 % (12/24/23 0753)  Exam:   Physical Exam  Vitals and nursing note reviewed.   Constitutional:       General: She is not in acute distress.     Appearance: She is well-developed. She is not ill-appearing.   HENT:      Head: Normocephalic and atraumatic.      Nose: Nose normal.      Mouth/Throat:      Mouth: Mucous membranes are moist.   Eyes:      General: No scleral icterus.        Right eye: No discharge.         Left eye: No discharge.      Extraocular Movements: Extraocular movements intact.      Conjunctiva/sclera: Conjunctivae normal.   Cardiovascular:      Rate and Rhythm: Normal rate and regular rhythm.      Heart sounds: No murmur heard.  Pulmonary:      Effort: Pulmonary effort is normal. No respiratory distress.      Breath sounds: Normal breath sounds. No wheezing, rhonchi or rales.   Abdominal:      Palpations: Abdomen is soft.      Tenderness: There is no abdominal tenderness. There is no guarding or rebound.   Musculoskeletal:         General: No swelling or tenderness.      Cervical back: Normal range of motion and neck supple.   Skin:     General: Skin is warm and dry.      Capillary Refill: Capillary refill takes less than 2 seconds.   Neurological:      Mental Status: She is alert and oriented to person, place, and time.   Psychiatric:         Mood and Affect: Mood normal.         Behavior: Behavior normal.         Thought Content: Thought content normal.          Discussion with Family: Updated  () via phone.    Discharge instructions/Information to patient and family:   See after visit summary for information provided to patient and family.      Provisions for Follow-Up Care:  See after visit summary for information related to follow-up care and any pertinent home health orders.      Mobility at time of Discharge:   Basic Mobility Inpatient Raw Score: 24  JH-HLM Goal: 8: Walk 250 feet or more  JH-HLM Achieved:  8: Walk 250 feet ot more  HLM Goal achieved. Continue to encourage appropriate mobility.     Disposition:   Home    Planned Readmission: None    Discharge Medications:  See after visit summary for reconciled discharge medications provided to patient and/or family.      **Please Note: This note may have been constructed using a voice recognition system**

## 2023-12-24 NOTE — PLAN OF CARE
Problem: PAIN - ADULT  Goal: Verbalizes/displays adequate comfort level or baseline comfort level  Description: Interventions:  - Encourage patient to monitor pain and request assistance  - Assess pain using appropriate pain scale  - Administer analgesics based on type and severity of pain and evaluate response  - Implement non-pharmacological measures as appropriate and evaluate response  - Consider cultural and social influences on pain and pain management  - Notify physician/advanced practitioner if interventions unsuccessful or patient reports new pain  Outcome: Progressing     Problem: INFECTION - ADULT  Goal: Absence or prevention of progression during hospitalization  Description: INTERVENTIONS:  - Assess and monitor for signs and symptoms of infection  - Monitor lab/diagnostic results  - Monitor all insertion sites, i.e. indwelling lines, tubes, and drains  - Monitor endotracheal if appropriate and nasal secretions for changes in amount and color  - [place] appropriate cooling/warming therapies per order  - Administer medications as ordered  - Instruct and encourage patient and family to use good hand hygiene technique  - Identify and instruct in appropriate isolation precautions for identified infection/condition  Outcome: Progressing  Goal: Absence of fever/infection during neutropenic period  Description: INTERVENTIONS:  - Monitor WBC    Outcome: Progressing     Problem: SAFETY ADULT  Goal: Patient will remain free of falls  Description: INTERVENTIONS:  - Educate patient/family on patient safety including physical limitations  - Instruct patient to call for assistance with activity   - Consult OT/PT to assist with strengthening/mobility   - Keep Call bell within reach  - Keep bed low and locked with side rails adjusted as appropriate  - Keep care items and personal belongings within reach  - Initiate and maintain comfort rounds  - Make Fall Risk Sign visible to staff  - Offer Toileting every  Hours,  in advance of need  - Initiate/Maintain alarm  - Obtain necessary fall risk management equipment:  - Apply yellow socks and bracelet for high fall risk patients  - Consider moving patient to room near nurses station  Outcome: Progressing  Goal: Maintain or return to baseline ADL function  Description: INTERVENTIONS:  -  Assess patient's ability to carry out ADLs; assess patient's baseline for ADL function and identify physical deficits which impact ability to perform ADLs (bathing, care of mouth/teeth, toileting, grooming, dressing, etc.)  - Assess/evaluate cause of self-care deficits   - Assess range of motion  - Assess patient's mobility; develop plan if impaired  - Assess patient's need for assistive devices and provide as appropriate  - Encourage maximum independence but intervene and supervise when necessary  - Involve family in performance of ADLs  - Assess for home care needs following discharge   - Consider OT consult to assist with ADL evaluation and planning for discharge  - Provide patient education as appropriate  Outcome: Progressing  Goal: Maintains/Returns to pre admission functional level  Description: INTERVENTIONS:  - Perform AM-PAC 6 Click Basic Mobility/ Daily Activity assessment daily.  - Set and communicate daily mobility goal to care team and patient/family/caregiver.   - Collaborate with rehabilitation services on mobility goals if consulted  - Perform Range of Motion  times a day.  - Reposition patient every  hours.  - Dangle patient  times a day  - Stand patient  times a day  - Ambulate patient times a day  - Out of bed to chair  times a day   - Out of bed for meals  times a day  - Out of bed for toileting  - Record patient progress and toleration of activity level   Outcome: Progressing     Problem: DISCHARGE PLANNING  Goal: Discharge to home or other facility with appropriate resources  Description: INTERVENTIONS:  - Identify barriers to discharge w/patient and caregiver  - Arrange for  needed discharge resources and transportation as appropriate  - Identify discharge learning needs (meds, wound care, etc.)  - Arrange for interpretive services to assist at discharge as needed  - Refer to Case Management Department for coordinating discharge planning if the patient needs post-hospital services based on physician/advanced practitioner order or complex needs related to functional status, cognitive ability, or social support system  Outcome: Progressing     Problem: Knowledge Deficit  Goal: Patient/family/caregiver demonstrates understanding of disease process, treatment plan, medications, and discharge instructions  Description: Complete learning assessment and assess knowledge base.  Interventions:  - Provide teaching at level of understanding  - Provide teaching via preferred learning methods  Outcome: Progressing

## 2023-12-24 NOTE — PLAN OF CARE
Problem: PAIN - ADULT  Goal: Verbalizes/displays adequate comfort level or baseline comfort level  Description: Interventions:  - Encourage patient to monitor pain and request assistance  - Assess pain using appropriate pain scale  - Administer analgesics based on type and severity of pain and evaluate response  - Implement non-pharmacological measures as appropriate and evaluate response  - Consider cultural and social influences on pain and pain management  - Notify physician/advanced practitioner if interventions unsuccessful or patient reports new pain  12/24/2023 1316 by Sherrell Carvajal RN  Outcome: Adequate for Discharge  12/24/2023 1316 by Sherrell Carvajal RN  Outcome: Adequate for Discharge     Problem: INFECTION - ADULT  Goal: Absence or prevention of progression during hospitalization  Description: INTERVENTIONS:  - Assess and monitor for signs and symptoms of infection  - Monitor lab/diagnostic results  - Monitor all insertion sites, i.e. indwelling lines, tubes, and drains  - Monitor endotracheal if appropriate and nasal secretions for changes in amount and color  - [place] appropriate cooling/warming therapies per order  - Administer medications as ordered  - Instruct and encourage patient and family to use good hand hygiene technique  - Identify and instruct in appropriate isolation precautions for identified infection/condition  12/24/2023 1316 by Sherrell Carvajal RN  Outcome: Adequate for Discharge  12/24/2023 1316 by Sherrell Carvajal RN  Outcome: Adequate for Discharge  Goal: Absence of fever/infection during neutropenic period  Description: INTERVENTIONS:  - Monitor WBC    12/24/2023 1316 by Sherrell Carvajal RN  Outcome: Adequate for Discharge  12/24/2023 1316 by Sherrell Carvajal RN  Outcome: Adequate for Discharge     Problem: SAFETY ADULT  Goal: Patient will remain free of falls  Description: INTERVENTIONS:  - Educate patient/family on patient safety including physical limitations  -  Instruct patient to call for assistance with activity   - Consult OT/PT to assist with strengthening/mobility   - Keep Call bell within reach  - Keep bed low and locked with side rails adjusted as appropriate  - Keep care items and personal belongings within reach  - Initiate and maintain comfort rounds  - Make Fall Risk Sign visible to staff  - Offer Toileting every  Hours, in advance of need  - Initiate/Maintain alarm  - Obtain necessary fall risk management equipment:  - Apply yellow socks and bracelet for high fall risk patients  - Consider moving patient to room near nurses station  12/24/2023 1316 by Sherrell Carvajal RN  Outcome: Adequate for Discharge  12/24/2023 1316 by Sherrell Carvajal RN  Outcome: Adequate for Discharge  Goal: Maintain or return to baseline ADL function  Description: INTERVENTIONS:  -  Assess patient's ability to carry out ADLs; assess patient's baseline for ADL function and identify physical deficits which impact ability to perform ADLs (bathing, care of mouth/teeth, toileting, grooming, dressing, etc.)  - Assess/evaluate cause of self-care deficits   - Assess range of motion  - Assess patient's mobility; develop plan if impaired  - Assess patient's need for assistive devices and provide as appropriate  - Encourage maximum independence but intervene and supervise when necessary  - Involve family in performance of ADLs  - Assess for home care needs following discharge   - Consider OT consult to assist with ADL evaluation and planning for discharge  - Provide patient education as appropriate  12/24/2023 1316 by Sherrell Carvajal RN  Outcome: Adequate for Discharge  12/24/2023 1316 by Sherrell Carvajal RN  Outcome: Adequate for Discharge  Goal: Maintains/Returns to pre admission functional level  Description: INTERVENTIONS:  - Perform AM-PAC 6 Click Basic Mobility/ Daily Activity assessment daily.  - Set and communicate daily mobility goal to care team and patient/family/caregiver.   -  Collaborate with rehabilitation services on mobility goals if consulted  - Perform Range of Motion  times a day.  - Reposition patient every  hours.  - Dangle patient  times a day  - Stand patient  times a day  - Ambulate patient times a day  - Out of bed to chair  times a day   - Out of bed for meals  times a day  - Out of bed for toileting  - Record patient progress and toleration of activity level   12/24/2023 1316 by Sherrell Carvajal RN  Outcome: Adequate for Discharge  12/24/2023 1316 by Sherrell Carvajal RN  Outcome: Adequate for Discharge     Problem: DISCHARGE PLANNING  Goal: Discharge to home or other facility with appropriate resources  Description: INTERVENTIONS:  - Identify barriers to discharge w/patient and caregiver  - Arrange for needed discharge resources and transportation as appropriate  - Identify discharge learning needs (meds, wound care, etc.)  - Arrange for interpretive services to assist at discharge as needed  - Refer to Case Management Department for coordinating discharge planning if the patient needs post-hospital services based on physician/advanced practitioner order or complex needs related to functional status, cognitive ability, or social support system  12/24/2023 1316 by Sherrell Carvajal RN  Outcome: Adequate for Discharge  12/24/2023 1316 by Sherrell Carvajal RN  Outcome: Adequate for Discharge     Problem: Knowledge Deficit  Goal: Patient/family/caregiver demonstrates understanding of disease process, treatment plan, medications, and discharge instructions  Description: Complete learning assessment and assess knowledge base.  Interventions:  - Provide teaching at level of understanding  - Provide teaching via preferred learning methods  12/24/2023 1316 by Sherrell Carvajal RN  Outcome: Adequate for Discharge  12/24/2023 1316 by Sherrell Carvajal RN  Outcome: Adequate for Discharge

## 2023-12-24 NOTE — DISCHARGE INSTR - AVS FIRST PAGE
Dear Suzanna Guerrero,     [facility].  It is our hope that we were always able to exceed the expected standards for your care during your stay.  You were hospitalized due to ***.  You were cared for on the *** floor by Jimmy Brock MD under the service of Drew Parish MD with the [facility] who covers for your primary care physician (PCP), Alejandra Esparza MD, while you were hospitalized.  If you have any questions or concerns related to this hospitalization, you may contact us at .  For follow up as well as any medication refills, we recommend that you follow up with your primary care physician.  A registered nurse will reach out to you by phone within a few days after your discharge to answer any additional questions that you may have after going home.  However, at this time we provide for you here, the most important instructions / recommendations at discharge:     Notable Medication Adjustments -   You can stop taking Keflex which was originally prescribed for presumed urinary tract infection. Your urine test was negative for infection.  You can take Zofran 8 mg every 8 hours as needed and Reglan 10 mg as needed every 8 hours for your nausea or vomiting.  You can continue to take all of the rest of medications.  Testing Required after Discharge -   Please repeat BMP within 1 week of discharge.  ** Please contact your PCP to request testing orders for any of the testing recommended here **  Important follow up information -   Please follow-up with your PCP within 1 week of discharge.  Other Instructions -   Please follow COVID quarantine policy- please continue to wear a mask.  Please get and stay well.  Please review this entire after visit summary as additional general instructions including medication list, appointments, activity, diet, any pertinent wound care, and other additional  recommendations from your care team that may be provided for you.      Sincerely,     Jimmy Brock MD

## 2023-12-24 NOTE — PLAN OF CARE
Problem: PAIN - ADULT  Goal: Verbalizes/displays adequate comfort level or baseline comfort level  Description: Interventions:  - Encourage patient to monitor pain and request assistance  - Assess pain using appropriate pain scale  - Administer analgesics based on type and severity of pain and evaluate response  - Implement non-pharmacological measures as appropriate and evaluate response  - Consider cultural and social influences on pain and pain management  - Notify physician/advanced practitioner if interventions unsuccessful or patient reports new pain  Outcome: Adequate for Discharge     Problem: INFECTION - ADULT  Goal: Absence or prevention of progression during hospitalization  Description: INTERVENTIONS:  - Assess and monitor for signs and symptoms of infection  - Monitor lab/diagnostic results  - Monitor all insertion sites, i.e. indwelling lines, tubes, and drains  - Monitor endotracheal if appropriate and nasal secretions for changes in amount and color  - [place] appropriate cooling/warming therapies per order  - Administer medications as ordered  - Instruct and encourage patient and family to use good hand hygiene technique  - Identify and instruct in appropriate isolation precautions for identified infection/condition  Outcome: Adequate for Discharge  Goal: Absence of fever/infection during neutropenic period  Description: INTERVENTIONS:  - Monitor WBC    Outcome: Adequate for Discharge     Problem: SAFETY ADULT  Goal: Patient will remain free of falls  Description: INTERVENTIONS:  - Educate patient/family on patient safety including physical limitations  - Instruct patient to call for assistance with activity   - Consult OT/PT to assist with strengthening/mobility   - Keep Call bell within reach  - Keep bed low and locked with side rails adjusted as appropriate  - Keep care items and personal belongings within reach  - Initiate and maintain comfort rounds  - Make Fall Risk Sign visible to  staff  - Offer Toileting every  Hours, in advance of need  - Initiate/Maintain alarm  - Obtain necessary fall risk management equipment:  - Apply yellow socks and bracelet for high fall risk patients  - Consider moving patient to room near nurses station  Outcome: Adequate for Discharge  Goal: Maintain or return to baseline ADL function  Description: INTERVENTIONS:  -  Assess patient's ability to carry out ADLs; assess patient's baseline for ADL function and identify physical deficits which impact ability to perform ADLs (bathing, care of mouth/teeth, toileting, grooming, dressing, etc.)  - Assess/evaluate cause of self-care deficits   - Assess range of motion  - Assess patient's mobility; develop plan if impaired  - Assess patient's need for assistive devices and provide as appropriate  - Encourage maximum independence but intervene and supervise when necessary  - Involve family in performance of ADLs  - Assess for home care needs following discharge   - Consider OT consult to assist with ADL evaluation and planning for discharge  - Provide patient education as appropriate  Outcome: Adequate for Discharge  Goal: Maintains/Returns to pre admission functional level  Description: INTERVENTIONS:  - Perform AM-PAC 6 Click Basic Mobility/ Daily Activity assessment daily.  - Set and communicate daily mobility goal to care team and patient/family/caregiver.   - Collaborate with rehabilitation services on mobility goals if consulted  - Perform Range of Motion  times a day.  - Reposition patient every  hours.  - Dangle patient  times a day  - Stand patient  times a day  - Ambulate patient times a day  - Out of bed to chair  times a day   - Out of bed for meals  times a day  - Out of bed for toileting  - Record patient progress and toleration of activity level   Outcome: Adequate for Discharge     Problem: DISCHARGE PLANNING  Goal: Discharge to home or other facility with appropriate resources  Description: INTERVENTIONS:  -  Identify barriers to discharge w/patient and caregiver  - Arrange for needed discharge resources and transportation as appropriate  - Identify discharge learning needs (meds, wound care, etc.)  - Arrange for interpretive services to assist at discharge as needed  - Refer to Case Management Department for coordinating discharge planning if the patient needs post-hospital services based on physician/advanced practitioner order or complex needs related to functional status, cognitive ability, or social support system  Outcome: Adequate for Discharge     Problem: Knowledge Deficit  Goal: Patient/family/caregiver demonstrates understanding of disease process, treatment plan, medications, and discharge instructions  Description: Complete learning assessment and assess knowledge base.  Interventions:  - Provide teaching at level of understanding  - Provide teaching via preferred learning methods  Outcome: Adequate for Discharge

## 2024-03-07 ENCOUNTER — HOSPITAL ENCOUNTER (OUTPATIENT)
Facility: HOSPITAL | Age: 44
Setting detail: OBSERVATION
Discharge: HOME | End: 2024-03-08
Attending: EMERGENCY MEDICINE | Admitting: HOSPITALIST
Payer: COMMERCIAL

## 2024-03-07 ENCOUNTER — APPOINTMENT (EMERGENCY)
Dept: CT IMAGING | Facility: HOSPITAL | Age: 44
End: 2024-03-07
Payer: COMMERCIAL

## 2024-03-07 DIAGNOSIS — R11.2 PONV (POSTOPERATIVE NAUSEA AND VOMITING): Primary | ICD-10-CM

## 2024-03-07 DIAGNOSIS — K86.9 PANCREATIC LESION: ICD-10-CM

## 2024-03-07 DIAGNOSIS — Z98.890 PONV (POSTOPERATIVE NAUSEA AND VOMITING): Primary | ICD-10-CM

## 2024-03-07 PROBLEM — R93.89 ABNORMAL CT SCAN: Status: ACTIVE | Noted: 2024-03-07

## 2024-03-07 LAB
ALBUMIN SERPL BCP-MCNC: 5.1 G/DL (ref 3.5–5)
ALP SERPL-CCNC: 52 U/L (ref 34–104)
ALT SERPL W P-5'-P-CCNC: 15 U/L (ref 7–52)
ANION GAP SERPL CALCULATED.3IONS-SCNC: 12 MMOL/L
AST SERPL W P-5'-P-CCNC: 19 U/L (ref 13–39)
ATRIAL RATE: 70 BPM
BASOPHILS # BLD AUTO: 0.01 THOUSANDS/ÂΜL (ref 0–0.1)
BASOPHILS NFR BLD AUTO: 0 % (ref 0–1)
BILIRUB SERPL-MCNC: 1.39 MG/DL (ref 0.2–1)
BUN SERPL-MCNC: 8 MG/DL (ref 5–25)
CALCIUM SERPL-MCNC: 10.4 MG/DL (ref 8.4–10.2)
CHLORIDE SERPL-SCNC: 106 MMOL/L (ref 96–108)
CO2 SERPL-SCNC: 21 MMOL/L (ref 21–32)
CREAT SERPL-MCNC: 0.74 MG/DL (ref 0.6–1.3)
EOSINOPHIL # BLD AUTO: 0 THOUSAND/ÂΜL (ref 0–0.61)
EOSINOPHIL NFR BLD AUTO: 0 % (ref 0–6)
ERYTHROCYTE [DISTWIDTH] IN BLOOD BY AUTOMATED COUNT: 12.4 % (ref 11.6–15.1)
GFR SERPL CREATININE-BSD FRML MDRD: 99 ML/MIN/1.73SQ M
GLUCOSE SERPL-MCNC: 118 MG/DL (ref 65–140)
HCT VFR BLD AUTO: 39.8 % (ref 34.8–46.1)
HGB BLD-MCNC: 13.9 G/DL (ref 11.5–15.4)
IMM GRANULOCYTES # BLD AUTO: 0.06 THOUSAND/UL (ref 0–0.2)
IMM GRANULOCYTES NFR BLD AUTO: 0 % (ref 0–2)
LIPASE SERPL-CCNC: 10 U/L (ref 11–82)
LYMPHOCYTES # BLD AUTO: 0.9 THOUSANDS/ÂΜL (ref 0.6–4.47)
LYMPHOCYTES NFR BLD AUTO: 6 % (ref 14–44)
MCH RBC QN AUTO: 32.8 PG (ref 26.8–34.3)
MCHC RBC AUTO-ENTMCNC: 34.9 G/DL (ref 31.4–37.4)
MCV RBC AUTO: 94 FL (ref 82–98)
MONOCYTES # BLD AUTO: 0.94 THOUSAND/ÂΜL (ref 0.17–1.22)
MONOCYTES NFR BLD AUTO: 6 % (ref 4–12)
NEUTROPHILS # BLD AUTO: 13.33 THOUSANDS/ÂΜL (ref 1.85–7.62)
NEUTS SEG NFR BLD AUTO: 88 % (ref 43–75)
NRBC BLD AUTO-RTO: 0 /100 WBCS
P AXIS: 55 DEGREES
PLATELET # BLD AUTO: 336 THOUSANDS/UL (ref 149–390)
PMV BLD AUTO: 10.1 FL (ref 8.9–12.7)
POTASSIUM SERPL-SCNC: 4.2 MMOL/L (ref 3.5–5.3)
PR INTERVAL: 150 MS
PROT SERPL-MCNC: 8.1 G/DL (ref 6.4–8.4)
QRS AXIS: 41 DEGREES
QRSD INTERVAL: 90 MS
QT INTERVAL: 450 MS
QTC INTERVAL: 486 MS
RBC # BLD AUTO: 4.24 MILLION/UL (ref 3.81–5.12)
SODIUM SERPL-SCNC: 139 MMOL/L (ref 135–147)
T WAVE AXIS: 34 DEGREES
VENTRICULAR RATE: 70 BPM
WBC # BLD AUTO: 15.24 THOUSAND/UL (ref 4.31–10.16)

## 2024-03-07 PROCEDURE — 99284 EMERGENCY DEPT VISIT MOD MDM: CPT

## 2024-03-07 PROCEDURE — 96374 THER/PROPH/DIAG INJ IV PUSH: CPT

## 2024-03-07 PROCEDURE — 99285 EMERGENCY DEPT VISIT HI MDM: CPT

## 2024-03-07 PROCEDURE — 96375 TX/PRO/DX INJ NEW DRUG ADDON: CPT

## 2024-03-07 PROCEDURE — C9113 INJ PANTOPRAZOLE SODIUM, VIA: HCPCS | Performed by: HOSPITALIST

## 2024-03-07 PROCEDURE — 74177 CT ABD & PELVIS W/CONTRAST: CPT

## 2024-03-07 PROCEDURE — 99222 1ST HOSP IP/OBS MODERATE 55: CPT | Performed by: HOSPITALIST

## 2024-03-07 PROCEDURE — 93005 ELECTROCARDIOGRAM TRACING: CPT

## 2024-03-07 PROCEDURE — 80053 COMPREHEN METABOLIC PANEL: CPT | Performed by: EMERGENCY MEDICINE

## 2024-03-07 PROCEDURE — 36415 COLL VENOUS BLD VENIPUNCTURE: CPT

## 2024-03-07 PROCEDURE — 83690 ASSAY OF LIPASE: CPT | Performed by: EMERGENCY MEDICINE

## 2024-03-07 PROCEDURE — 96361 HYDRATE IV INFUSION ADD-ON: CPT

## 2024-03-07 PROCEDURE — 85025 COMPLETE CBC W/AUTO DIFF WBC: CPT | Performed by: EMERGENCY MEDICINE

## 2024-03-07 RX ORDER — ONDANSETRON 2 MG/ML
4 INJECTION INTRAMUSCULAR; INTRAVENOUS EVERY 6 HOURS PRN
Status: DISCONTINUED | OUTPATIENT
Start: 2024-03-07 | End: 2024-03-08 | Stop reason: HOSPADM

## 2024-03-07 RX ORDER — ESCITALOPRAM OXALATE 20 MG/1
20 TABLET ORAL DAILY
Status: DISCONTINUED | OUTPATIENT
Start: 2024-03-07 | End: 2024-03-07

## 2024-03-07 RX ORDER — DROPERIDOL 2.5 MG/ML
0.62 INJECTION, SOLUTION INTRAMUSCULAR; INTRAVENOUS ONCE
Status: COMPLETED | OUTPATIENT
Start: 2024-03-07 | End: 2024-03-07

## 2024-03-07 RX ORDER — ONDANSETRON 2 MG/ML
INJECTION INTRAMUSCULAR; INTRAVENOUS
Status: COMPLETED
Start: 2024-03-07 | End: 2024-03-07

## 2024-03-07 RX ORDER — LORAZEPAM 2 MG/ML
0.5 INJECTION INTRAMUSCULAR ONCE
Status: COMPLETED | OUTPATIENT
Start: 2024-03-07 | End: 2024-03-07

## 2024-03-07 RX ORDER — PANTOPRAZOLE SODIUM 40 MG/10ML
40 INJECTION, POWDER, LYOPHILIZED, FOR SOLUTION INTRAVENOUS
Status: DISCONTINUED | OUTPATIENT
Start: 2024-03-07 | End: 2024-03-08 | Stop reason: HOSPADM

## 2024-03-07 RX ORDER — SODIUM CHLORIDE 9 MG/ML
125 INJECTION, SOLUTION INTRAVENOUS CONTINUOUS
Status: DISCONTINUED | OUTPATIENT
Start: 2024-03-07 | End: 2024-03-08 | Stop reason: HOSPADM

## 2024-03-07 RX ORDER — PROCHLORPERAZINE MALEATE 10 MG
5 TABLET ORAL EVERY 6 HOURS PRN
Status: DISCONTINUED | OUTPATIENT
Start: 2024-03-07 | End: 2024-03-08 | Stop reason: HOSPADM

## 2024-03-07 RX ORDER — ESCITALOPRAM OXALATE 20 MG/1
20 TABLET ORAL DAILY
Status: DISCONTINUED | OUTPATIENT
Start: 2024-03-07 | End: 2024-03-08 | Stop reason: HOSPADM

## 2024-03-07 RX ADMIN — ESCITALOPRAM OXALATE 20 MG: 20 TABLET ORAL at 18:28

## 2024-03-07 RX ADMIN — DROPERIDOL 0.62 MG: 2.5 INJECTION, SOLUTION INTRAMUSCULAR; INTRAVENOUS at 11:27

## 2024-03-07 RX ADMIN — LORAZEPAM 0.5 MG: 2 INJECTION INTRAMUSCULAR; INTRAVENOUS at 11:25

## 2024-03-07 RX ADMIN — SODIUM CHLORIDE 1000 ML: 0.9 INJECTION, SOLUTION INTRAVENOUS at 11:30

## 2024-03-07 RX ADMIN — ONDANSETRON 4 MG: 2 INJECTION INTRAMUSCULAR; INTRAVENOUS at 10:04

## 2024-03-07 RX ADMIN — PANTOPRAZOLE SODIUM 40 MG: 40 INJECTION, POWDER, FOR SOLUTION INTRAVENOUS at 15:08

## 2024-03-07 RX ADMIN — SODIUM CHLORIDE 125 ML/HR: 0.9 INJECTION, SOLUTION INTRAVENOUS at 16:14

## 2024-03-07 RX ADMIN — IOHEXOL 80 ML: 350 INJECTION, SOLUTION INTRAVENOUS at 11:54

## 2024-03-07 NOTE — PLAN OF CARE
Problem: PAIN - ADULT  Goal: Verbalizes/displays adequate comfort level or baseline comfort level  Description: Interventions:  - Encourage patient to monitor pain and request assistance  - Assess pain using appropriate pain scale  - Administer analgesics based on type and severity of pain and evaluate response  - Implement non-pharmacological measures as appropriate and evaluate response  - Consider cultural and social influences on pain and pain management  - Notify physician/advanced practitioner if interventions unsuccessful or patient reports new pain  Outcome: Progressing     Problem: INFECTION - ADULT  Goal: Absence or prevention of progression during hospitalization  Description: INTERVENTIONS:  - Assess and monitor for signs and symptoms of infection  - Monitor lab/diagnostic results  - Monitor all insertion sites, i.e. indwelling lines, tubes, and drains  - Monitor endotracheal if appropriate and nasal secretions for changes in amount and color  - [place] appropriate cooling/warming therapies per order  - Administer medications as ordered  - Instruct and encourage patient and family to use good hand hygiene technique  - Identify and instruct in appropriate isolation precautions for identified infection/condition  Outcome: Progressing     Problem: DISCHARGE PLANNING  Goal: Discharge to home or other facility with appropriate resources  Description: INTERVENTIONS:  - Identify barriers to discharge w/patient and caregiver  - Arrange for needed discharge resources and transportation as appropriate  - Identify discharge learning needs (meds, wound care, etc.)  - Arrange for interpretive services to assist at discharge as needed  - Refer to Case Management Department for coordinating discharge planning if the patient needs post-hospital services based on physician/advanced practitioner order or complex needs related to functional status, cognitive ability, or social support system  Outcome: Progressing      Problem: GASTROINTESTINAL - ADULT  Goal: Minimal or absence of nausea and/or vomiting  Description: INTERVENTIONS:  - Administer IV fluids if ordered to ensure adequate hydration  - Maintain NPO status until nausea and vomiting are resolved  - Nasogastric tube if ordered  - Administer ordered antiemetic medications as needed  - Provide nonpharmacologic comfort measures as appropriate  - Advance diet as tolerated, if ordered  - Consider nutrition services referral to assist patient with adequate nutrition and appropriate food choices  Outcome: Progressing  Goal: Maintains or returns to baseline bowel function  Description: INTERVENTIONS:  - Assess bowel function  - Encourage oral fluids to ensure adequate hydration  - Administer IV fluids if ordered to ensure adequate hydration  - Administer ordered medications as needed  - Encourage mobilization and activity  - Consider nutritional services referral to assist patient with adequate nutrition and appropriate food choices  Outcome: Progressing  Goal: Maintains adequate nutritional intake  Description: INTERVENTIONS:  - Monitor percentage of each meal consumed  - Identify factors contributing to decreased intake, treat as appropriate  - Assist with meals as needed  - Monitor I&O, weight, and lab values if indicated  - Obtain nutrition services referral as needed  Outcome: Progressing       used

## 2024-03-07 NOTE — ASSESSMENT & PLAN NOTE
CT abd: Mild fullness and hypodense appearance of the inferior pancreatic head. Otherwise unremarkable pancreas. This may be technical in nature, but given recurrent complains of nausea and vomiting, consider correlation with nonemergent, possibly outpatient MRI with and without contrast.  Lipase normal  Consider outpt MRI

## 2024-03-07 NOTE — ASSESSMENT & PLAN NOTE
POA with intractable nausea and vomiting after recent colonoscopy.  Has had issues with severe nausea post procedure in the past (cholecystectomy and EGD)  Symptoms somewhat improved after receiving droperidol  Would continue IV Zofran as needed and add Compazine as second line  Pt not interested in food at the moment, but diet ordered if her symptoms begin to improve.

## 2024-03-07 NOTE — LETTER
[facility] FLO[facility] MED SURG UN[facility]  [address]  [place] [id number]  Dept: [phone]    March 8, 2024     Patient: Suzanna Guerrero   YOB: 1980   Date of Visit: 3/7/2024       To Whom it May Concern:    Suzanna Guerrero is under my professional care. She was seen in the hospital from 3/7/2024 to 03/08/24. She may return to work on 3/11 without limitations.    If you have any questions or concerns, please don't hesitate to call.         Sincerely,            Liam Griffith PA-C

## 2024-03-07 NOTE — H&P
[facility]  H&P  Name: Suzanna Guerrero 43 y.o. female I MRN: [id number]  Unit/Bed#: ED-11 I Date of Admission: 3/7/2024   Date of Service: 3/7/2024 I Hospital Day: 0      Assessment/Plan   * Intractable vomiting with nausea  Assessment & Plan  POA with intractable nausea and vomiting after recent colonoscopy.  Has had issues with severe nausea post procedure in the past (cholecystectomy and EGD)  Symptoms somewhat improved after receiving droperidol  Would continue IV Zofran as needed and add Compazine as second line  Pt not interested in food at the moment, but diet ordered if her symptoms begin to improve.      Anxiety  Assessment & Plan  Cont home meds     Abnormal CT scan  Assessment & Plan  CT abd: Mild fullness and hypodense appearance of the inferior pancreatic head. Otherwise unremarkable pancreas. This may be technical in nature, but given recurrent complains of nausea and vomiting, consider correlation with nonemergent, possibly outpatient MRI with and without contrast.  Lipase normal  Consider outpt MRI            VTE Pharmacologic Prophylaxis: VTE Score: 2 Low Risk (Score 0-2) - Encourage Ambulation.  Code Status: FULL   Discussion with family: Patient declined call to .     Anticipated Length of Stay: Patient will be admitted on an observation basis with an anticipated length of stay of less than 2 midnights secondary to intractable nausea and vomiting. .    Total Time Spent on Date of Encounter in care of patient: 45 mins. This time was spent on one or more of the following: performing physical exam; counseling and coordination of care; obtaining or reviewing history; documenting in the medical record; reviewing/ordering tests, medications or procedures; communicating with other healthcare professionals and discussing with patient's family/caregivers.    Chief Complaint: Nausea with vomiting     History of Present Illness:  Suzanna Guerrero is a 43 y.o. female with a  PMH of anxiety, gastritis who presents with intractable nausea and vomiting.  Patient underwent colonoscopy yesterday and postprocedure continued to have vomiting.  Has both Zofran and Reglan at home but were ineffective.  Has had similar symptoms post laparoscopic cholecystectomy.  No abdominal pain.  No blood in vomit. .    Review of Systems:  Review of Systems   Constitutional:  Negative for chills and fatigue.   Gastrointestinal:  Positive for abdominal pain, nausea and vomiting.   All other systems reviewed and are negative.      Past Medical and Surgical History:   Past Medical History:   Diagnosis Date    Anxiety     Bladder problem     Heart murmur     Hyperlipidemia     Migraine     Ovarian cyst     left       Past Surgical History:   Procedure Laterality Date    ARTHROSCOPY KNEE Left     CHOLECYSTECTOMY  11/01/2023    DILATION AND CURETTAGE OF UTERUS  07/08/2014    HYSTERECTOMY  08/18/2014    Has ovaries    HYSTEROSCOPY  07/08/2014    KNEE SURGERY      LASER ABLATION OF THE CERVIX  07/07/2014    OVARIAN CYST REMOVAL      TUBAL LIGATION  07/07/2014    WISDOM TOOTH EXTRACTION      x4        Meds/Allergies:  Prior to Admission medications    Medication Sig Start Date End Date Taking? Authorizing Provider   acetaminophen (TYLENOL) 500 mg tablet Take 2 tablets (1,000 mg total) by mouth every 6 (six) hours 9/19/18   Jenn Rodriges PA-C   dicyclomine (BENTYL) 20 mg tablet TAKE 1 TABLET (20 MG TOTAL) BY MOUTH EVERY 6 (SIX) HOURS 1/17/22   Bernadine Meza PA-C   escitalopram (LEXAPRO) 10 mg tablet Take 10 mg by mouth daily    Historical Provider, MD   metoclopramide (Reglan) 10 mg tablet Take 1 tablet (10 mg total) by mouth 3 (three) times a day as needed (nausea, vomitting.) 12/24/23   Jimmy Brock MD   naproxen sodium (ANAPROX) 550 mg tablet Take 550 mg by mouth    Historical Provider, MD   ondansetron (ZOFRAN) 8 mg tablet Take 1 tablet (8 mg total) by mouth every 8 (eight) hours as needed for nausea or vomiting  12/24/23   Jimmy Brock MD   pantoprazole (PROTONIX) 20 mg tablet Take 1 tablet (20 mg total) by mouth daily 6/3/20   Rema Landry PA-C   ALPRAZolam (XANAX) 0.25 mg tablet alprazolam 0.25 mg tablet 9/19/18 3/7/24  Historical Provider, MD   butalbital-acetaminophen-caffeine (FIORICET,ESGIC) -40 mg per tablet Take 1 tablet by mouth every 4 (four) hours as needed for headaches 9/11/18 3/7/24  Jennifer Kirkpatrick DO   ciprofloxacin (CIPRO) 250 mg tablet ciprofloxacin 250 mg tablet  3/7/24  Historical Provider, MD   fluconazole (DIFLUCAN) 150 mg tablet fluconazole 150 mg tablet  3/7/24  Historical Provider, MD   fluconazole (DIFLUCAN) 200 mg tablet fluconazole 200 mg tablet  3/7/24  Historical Provider, MD   ibuprofen (MOTRIN) 200 mg tablet Take 600 mg by mouth every 6 (six) hours as needed 2/7/19 3/7/24  Historical Provider, MD   metroNIDAZOLE (FLAGYL) 500 mg tablet metronidazole 500 mg tablet  3/7/24  Historical Provider, MD   oxyCODONE-acetaminophen (PERCOCET) 5-325 mg per tablet oxycodone-acetaminophen 5 mg-325 mg tablet  3/7/24  Historical Provider, MD   PARoxetine (PAXIL) 10 mg tablet paroxetine 10 mg tablet  3/7/24  Historical Provider, MD   traMADol (ULTRAM) 50 mg tablet Take 50 mg by mouth every 6 (six) hours as needed  3/7/24  Historical Provider, MD JAY have reviewed home medications with patient personally.    Allergies:   Allergies   Allergen Reactions    Chlorhexidine Gluconate Rash     2 x post surgery developed rash on prep area    Medical Tape Rash       Social History:  Marital Status: /Civil Union   Occupation:   Patient Pre-hospital Living Situation: Home  Patient Pre-hospital Level of Mobility: walks  Patient Pre-hospital Diet Restrictions:   Substance Use History:   Social History     Substance and Sexual Activity   Alcohol Use Yes    Comment: social     Social History     Tobacco Use   Smoking Status Former    Average packs/day: 0.5 packs/day for 15.0 years (7.5 ttl pk-yrs)     Types: Cigarettes    Start date: 12/1/2006   Smokeless Tobacco Former     Social History     Substance and Sexual Activity   Drug Use Yes    Types: Marijuana       Family History:  Family History   Problem Relation Age of Onset    Diabetes Father     Lung cancer Maternal Grandmother        Physical Exam:     Vitals:   Blood Pressure: 97/51 (03/07/24 1335)  Pulse: (!) 50 (03/07/24 1230)  Temperature: 98.3 °F (36.8 °C) (03/07/24 0950)  Temp Source: Oral (03/07/24 0950)  Respirations: 18 (03/07/24 1335)  SpO2: 97 % (03/07/24 1335)    Physical Exam  Constitutional:       General: She is not in acute distress.     Appearance: Normal appearance. She is not toxic-appearing or diaphoretic.   HENT:      Head: Normocephalic and atraumatic.   Cardiovascular:      Rate and Rhythm: Regular rhythm. Bradycardia present.   Pulmonary:      Effort: Pulmonary effort is normal. No respiratory distress.      Breath sounds: Normal breath sounds. No wheezing or rales.   Chest:      Chest wall: No tenderness.   Abdominal:      General: Abdomen is flat. There is no distension.      Palpations: Abdomen is soft.      Tenderness: There is abdominal tenderness (mild diffuse).   Skin:     General: Skin is warm and dry.   Neurological:      General: No focal deficit present.      Mental Status: She is alert and oriented to person, place, and time.          Additional Data:     Lab Results:  Results from last 7 days   Lab Units 03/07/24  0956   WBC Thousand/uL 15.24*   HEMOGLOBIN g/dL 13.9   HEMATOCRIT % 39.8   PLATELETS Thousands/uL 336   NEUTROS PCT % 88*   LYMPHS PCT % 6*   MONOS PCT % 6   EOS PCT % 0     Results from last 7 days   Lab Units 03/07/24  0956   SODIUM mmol/L 139   POTASSIUM mmol/L 4.2   CHLORIDE mmol/L 106   CO2 mmol/L 21   BUN mg/dL 8   CREATININE mg/dL 0.74   ANION GAP mmol/L 12   CALCIUM mg/dL 10.4*   ALBUMIN g/dL 5.1*   TOTAL BILIRUBIN mg/dL 1.39*   ALK PHOS U/L 52   ALT U/L 15   AST U/L 19   GLUCOSE RANDOM mg/dL 118                        Lines/Drains:  Invasive Devices       Peripheral Intravenous Line  Duration             Peripheral IV 03/07/24 Left;Proximal;Ventral (anterior) Forearm <1 day                        Imaging: Reviewed radiology reports from this admission including: abdominal/pelvic CT  CT abdomen pelvis with contrast   Final Result by Thao Hidalgo MD (03/07 1302)   No acute intra-abdominal pathology.   Mild fullness and hypodense appearance of the inferior pancreatic head. Otherwise unremarkable pancreas. This may be technical in nature, but given recurrent complains of nausea and vomiting, consider correlation with nonemergent, possibly outpatient MRI    with and without contrast. Correlate with pancreatic enzymes.   Two-point centimeter diverticulum at the posterior wall of the gastric fundus, of uncertain clinical significance.   Other chronic findings, as per the body of the report.            Workstation performed: [id number]             EKG and Other Studies Reviewed on Admission:   EKG: No EKG obtained. Pending     ** Please Note: This note has been constructed using a voice recognition system. **

## 2024-03-07 NOTE — ED PROVIDER NOTES
"History  Chief Complaint   Patient presents with    Vomiting     Vomiting  that started yesterday after her colonscopy yesterday. Took zofran and reglan without relief. Ancelmo its a reaction to the anesthesia     Is a 43-year-old female presents today with concerns of intractable nausea and vomiting for the last 24 hours.  Patient states that she typically gets like this after procedures and anesthesia.  Had a colonoscopy yesterday.  States that she has diffuse abdominal pain.  Patient states that she has not been able to eat or drink in the last 24 hours secondary to the vomiting.  Denies any blood in the vomit.  No diarrhea constipation.  Patient had a colonoscopy secondary to prolonged diarrhea, nonbloody.  Denies any other symptoms this time.  Patient does inform me that she smokes Medical marijuana and assures me that she has a \"card\".        Prior to Admission Medications   Prescriptions Last Dose Informant Patient Reported? Taking?   acetaminophen (TYLENOL) 500 mg tablet   No No   Sig: Take 2 tablets (1,000 mg total) by mouth every 6 (six) hours   dicyclomine (BENTYL) 20 mg tablet   No No   Sig: TAKE 1 TABLET (20 MG TOTAL) BY MOUTH EVERY 6 (SIX) HOURS   escitalopram (LEXAPRO) 10 mg tablet   Yes No   Sig: Take 10 mg by mouth daily   metoclopramide (Reglan) 10 mg tablet   No No   Sig: Take 1 tablet (10 mg total) by mouth 3 (three) times a day as needed (nausea, vomitting.)   naproxen sodium (ANAPROX) 550 mg tablet   Yes No   Sig: Take 550 mg by mouth   ondansetron (ZOFRAN) 8 mg tablet   No No   Sig: Take 1 tablet (8 mg total) by mouth every 8 (eight) hours as needed for nausea or vomiting   pantoprazole (PROTONIX) 20 mg tablet   No No   Sig: Take 1 tablet (20 mg total) by mouth daily      Facility-Administered Medications: None       Past Medical History:   Diagnosis Date    Anxiety     Bladder problem     Heart murmur     Hyperlipidemia     Migraine     Ovarian cyst     left       Past Surgical History: "   Procedure Laterality Date    ARTHROSCOPY KNEE Left     CHOLECYSTECTOMY  11/01/2023    DILATION AND CURETTAGE OF UTERUS  07/08/2014    HYSTERECTOMY  08/18/2014    Has ovaries    HYSTEROSCOPY  07/08/2014    KNEE SURGERY      LASER ABLATION OF THE CERVIX  07/07/2014    OVARIAN CYST REMOVAL      TUBAL LIGATION  07/07/2014    WISDOM TOOTH EXTRACTION      x4        Family History   Problem Relation Age of Onset    Diabetes Father     Lung cancer Maternal Grandmother      I have reviewed and agree with the history as documented.    E-Cigarette/Vaping    E-Cigarette Use Never User      E-Cigarette/Vaping Substances    Nicotine No     THC No     CBD No     Flavoring No     Other No     Unknown No      Social History     Tobacco Use    Smoking status: Former     Average packs/day: 0.5 packs/day for 15.0 years (7.5 ttl pk-yrs)     Types: Cigarettes     Start date: 12/1/2006    Smokeless tobacco: Former   Vaping Use    Vaping status: Never Used   Substance Use Topics    Alcohol use: Yes     Comment: social    Drug use: Yes     Types: Marijuana       Review of Systems   Constitutional:  Negative for chills and fever.   HENT:  Negative for ear pain and sore throat.    Eyes:  Negative for pain and visual disturbance.   Respiratory:  Negative for cough and shortness of breath.    Cardiovascular:  Negative for chest pain and palpitations.   Gastrointestinal:  Positive for abdominal pain, nausea and vomiting. Negative for abdominal distention, anal bleeding, blood in stool, constipation, diarrhea and rectal pain.   Genitourinary:  Negative for dysuria and hematuria.   Musculoskeletal:  Negative for arthralgias and back pain.   Skin:  Negative for color change and rash.   Neurological:  Negative for seizures and syncope.   All other systems reviewed and are negative.      Physical Exam  Physical Exam  Vitals and nursing note reviewed.   Constitutional:       General: She is not in acute distress.     Appearance: She is  well-developed. She is ill-appearing (actively vomiting on exam).   HENT:      Head: Normocephalic and atraumatic.   Eyes:      Conjunctiva/sclera: Conjunctivae normal.   Cardiovascular:      Rate and Rhythm: Normal rate and regular rhythm.      Heart sounds: No murmur heard.  Pulmonary:      Effort: Pulmonary effort is normal. No respiratory distress.      Breath sounds: Normal breath sounds.   Abdominal:      General: There is no distension.      Palpations: Abdomen is soft. There is no mass.      Tenderness: There is abdominal tenderness. There is guarding. There is no right CVA tenderness, left CVA tenderness or rebound.      Hernia: No hernia is present.   Musculoskeletal:         General: No swelling.      Cervical back: Neck supple.   Skin:     General: Skin is warm and dry.      Capillary Refill: Capillary refill takes less than 2 seconds.   Neurological:      Mental Status: She is alert.   Psychiatric:         Mood and Affect: Mood normal.         Vital Signs  ED Triage Vitals   Temperature Pulse Respirations Blood Pressure SpO2   03/07/24 0950 03/07/24 0950 03/07/24 0950 03/07/24 0952 03/07/24 0950   98.3 °F (36.8 °C) 75 20 137/63 100 %      Temp Source Heart Rate Source Patient Position - Orthostatic VS BP Location FiO2 (%)   03/07/24 0950 03/07/24 0950 -- 03/07/24 1130 --   Oral Monitor  Right arm       Pain Score       --                  Vitals:    03/07/24 1130 03/07/24 1200 03/07/24 1230 03/07/24 1335   BP: 164/70 148/67 132/60 97/51   Pulse: (!) 52 (!) 51 (!) 50          Visual Acuity      ED Medications  Medications   ondansetron (ZOFRAN) 4 mg/2 mL injection **ADS Override Pull** (4 mg  Given 3/7/24 1004)   droperidol (INAPSINE) injection 0.625 mg (0.625 mg Intravenous Given 3/7/24 1127)   LORazepam (ATIVAN) injection 0.5 mg (0.5 mg Intravenous Given 3/7/24 1125)   sodium chloride 0.9 % bolus 1,000 mL (1,000 mL Intravenous New Bag 3/7/24 1130)   iohexol (OMNIPAQUE) 350 MG/ML injection (MULTI-DOSE)  100 mL (80 mL Intravenous Given 3/7/24 1154)       Diagnostic Studies  Results Reviewed       Procedure Component Value Units Date/Time    Comprehensive metabolic panel [[id number]]  (Abnormal) Collected: 03/07/24 0956    Lab Status: Final result Specimen: Blood from Arm, Left Updated: 03/07/24 1101     Sodium 139 mmol/L      Potassium 4.2 mmol/L      Chloride 106 mmol/L      CO2 21 mmol/L      ANION GAP 12 mmol/L      BUN 8 mg/dL      Creatinine 0.74 mg/dL      Glucose 118 mg/dL      Calcium 10.4 mg/dL      AST 19 U/L      ALT 15 U/L      Alkaline Phosphatase 52 U/L      Total Protein 8.1 g/dL      Albumin 5.1 g/dL      Total Bilirubin 1.39 mg/dL      eGFR 99 ml/min/1.73sq m     Narrative:      National Kid[employer] Foundation guidelines for Chronic Kidney Disease (CKD):     Stage 1 with normal or high GFR (GFR > 90 mL/min/1.73 square meters)    Stage 2 Mild CKD (GFR = 60-89 mL/min/1.73 square meters)    Stage 3A Moderate CKD (GFR = 45-59 mL/min/1.73 square meters)    Stage 3B Moderate CKD (GFR = 30-44 mL/min/1.73 square meters)    Stage 4 Severe CKD (GFR = 15-29 mL/min/1.73 square meters)    Stage 5 End Stage CKD (GFR <15 mL/min/1.73 square meters)  Note: GFR calculation is accurate only with a steady state creatinine    Lipase [[id number]]  (Abnormal) Collected: 03/07/24 0956    Lab Status: Final result Specimen: Blood from Arm, Left Updated: 03/07/24 1101     Lipase 10 u/L     CBC and differential [[id number]]  (Abnormal) Collected: 03/07/24 0956    Lab Status: Final result Specimen: Blood from Arm, Left Updated: 03/07/24 1007     WBC 15.24 Thousand/uL      RBC 4.24 Million/uL      Hemoglobin 13.9 g/dL      Hematocrit 39.8 %      MCV 94 fL      MCH 32.8 pg      MCHC 34.9 g/dL      RDW 12.4 %      MPV 10.1 fL      Platelets 336 Thousands/uL      nRBC 0 /100 WBCs      Neutrophils Relative 88 %      Immat GRANS % 0 %      Lymphocytes Relative 6 %      Monocytes Relative 6 %      Eosinophils Relative 0 %       Basophils Relative 0 %      Neutrophils Absolute 13.33 Thousands/µL      Immature Grans Absolute 0.06 Thousand/uL      Lymphocytes Absolute 0.90 Thousands/µL      Monocytes Absolute 0.94 Thousand/µL      Eosinophils Absolute 0.00 Thousand/µL      Basophils Absolute 0.01 Thousands/µL                    CT abdomen pelvis with contrast   Final Result by Thao Hidalgo MD (03/07 1302)   No acute intra-abdominal pathology.   Mild fullness and hypodense appearance of the inferior pancreatic head. Otherwise unremarkable pancreas. This may be technical in nature, but given recurrent complains of nausea and vomiting, consider correlation with nonemergent, possibly outpatient MRI    with and without contrast. Correlate with pancreatic enzymes.   Two-point centimeter diverticulum at the posterior wall of the gastric fundus, of uncertain clinical significance.   Other chronic findings, as per the body of the report.            Workstation performed: [id number]                    Procedures  Procedures         ED Course  ED Course as of 03/07/24 1405   Thu Mar 07, 2024   1118 Total Bilirubin(!): 1.39  Similar to previous. Patient does admit to diffuse abdominal pain                                             Medical Decision Making  43 year old female presenting today with concerns of N/V/Abdominal pain, which started after colonoscopy yesterday.  Labs  Rehydration  Zofran given prior to my evaluation, patient still nauseous.  CT abd pelv with contrast. Suspicious of perforation after colonoscopy.  Labs with nonspecific leukocytosis.  Lipase 10.  T. bili elevated, similar to previous.  Electrolytes within normal limits.  Patient feeling better after droperidol.  CT abdomen pelvis did not show anything acute but did reveal incidental finding of possible pancreatic head lesion.  With concern of postop nausea vomiting and new finding of pancreatic head lesion of unspecified character, will have patient admitted for IV hydration  and monitoring.  Patient at time of admission was stable.    Amount and/or Complexity of Data Reviewed  Labs: ordered. Decision-making details documented in ED Course.  Radiology: ordered.    Risk  Prescription drug management.  Decision regarding hospitalization.             Disposition  Final diagnoses:   PONV (postoperative nausea and vomiting)   Pancreatic lesion     Time reflects when diagnosis was documented in both MDM as applicable and the Disposition within this note       Time User Action Codes Description Comment    3/7/2024 12:51 PM Damien Hernandez [R11.2,  Z98.890] PONV (postoperative nausea and vomiting)     3/7/2024  1:17 PM Damien Hernandez [K86.9] Pancreatic lesion           ED Disposition       ED Disposition   Admit    Condition   Stable    Date/Time   Thu Mar 7, 2024  1:17 PM    Comment   Case was discussed with Dr. Mckeon and the patient's admission status was agreed to be Admission Status: observation status to the service of Dr. Mckeon .               Follow-up Information       Follow up With Specialties Details Why Contact Info Additional Information    [facility] Emergency Department Emergency Medicine Go to  If symptoms worsen [address] [place] [id number]  [phone] [facility] Emergency Department, [facility] [place], [place], [address]            Patient's Medications   Discharge Prescriptions    No medications on file       No discharge procedures on file.    PDMP Review         Value Time User    PDMP Reviewed  Yes 12/21/2023 11:54 PM Grover Kapoor MD            ED Provider  Electronically Signed by             Damien Hernandez PA-C  03/07/24 0563

## 2024-03-08 VITALS
OXYGEN SATURATION: 95 % | SYSTOLIC BLOOD PRESSURE: 94 MMHG | DIASTOLIC BLOOD PRESSURE: 46 MMHG | HEART RATE: 58 BPM | TEMPERATURE: 98.9 F | RESPIRATION RATE: 18 BRPM

## 2024-03-08 PROCEDURE — C9113 INJ PANTOPRAZOLE SODIUM, VIA: HCPCS | Performed by: HOSPITALIST

## 2024-03-08 PROCEDURE — 99239 HOSP IP/OBS DSCHRG MGMT >30: CPT | Performed by: PHYSICIAN ASSISTANT

## 2024-03-08 RX ORDER — LORAZEPAM 2 MG/ML
0.5 INJECTION INTRAMUSCULAR ONCE
Status: COMPLETED | OUTPATIENT
Start: 2024-03-08 | End: 2024-03-08

## 2024-03-08 RX ORDER — PROCHLORPERAZINE MALEATE 5 MG/1
5 TABLET ORAL EVERY 6 HOURS PRN
Qty: 30 TABLET | Refills: 0 | Status: SHIPPED | OUTPATIENT
Start: 2024-03-08

## 2024-03-08 RX ORDER — DROPERIDOL 2.5 MG/ML
0.62 INJECTION, SOLUTION INTRAMUSCULAR; INTRAVENOUS ONCE
Status: COMPLETED | OUTPATIENT
Start: 2024-03-08 | End: 2024-03-08

## 2024-03-08 RX ADMIN — ESCITALOPRAM OXALATE 20 MG: 20 TABLET ORAL at 08:00

## 2024-03-08 RX ADMIN — ONDANSETRON 4 MG: 2 INJECTION INTRAMUSCULAR; INTRAVENOUS at 08:01

## 2024-03-08 RX ADMIN — PANTOPRAZOLE SODIUM 40 MG: 40 INJECTION, POWDER, FOR SOLUTION INTRAVENOUS at 08:00

## 2024-03-08 RX ADMIN — LORAZEPAM 0.5 MG: 2 INJECTION INTRAMUSCULAR; INTRAVENOUS at 11:22

## 2024-03-08 RX ADMIN — DROPERIDOL 0.62 MG: 2.5 INJECTION, SOLUTION INTRAMUSCULAR; INTRAVENOUS at 12:14

## 2024-03-08 NOTE — UTILIZATION REVIEW
Initial Clinical Review    Admission: Date/Time/Statement:   Admission Orders (From admission, onward)       Ordered        03/07/24 1317  Place in Observation  Once                          Orders Placed This Encounter   Procedures    Place in Observation     Standing Status:   Standing     Number of Occurrences:   1     Order Specific Question:   Level of Care     Answer:   Med Surg [16]     ED Arrival Information       Expected   -    Arrival   3/7/2024 09:39    Acuity   Urgent              Means of arrival   Walk-In    Escorted by   Spouse    Service   Hospitalist    Admission type   Emergency              Arrival complaint   Vomiting             Chief Complaint   Patient presents with    Vomiting     Vomiting  that started yesterday after her colonscopy yesterday. Took zofran and reglan without relief. Belkieves its a reaction to the anesthesia       Initial Presentation: 43 y.o. female  with hx anxiety, gastritis who presents to ED from home with intractable nausea and vomiting.  Patient underwent outpt  colonoscopy yesterday and postprocedure continued to have vomiting.  Has both Zofran and Reglan at home but were ineffective. Pt  had similar symptoms post laparoscopic cholecystectomy. On exam, diffuse abdominal tenderness   Labs WBC 15.24, T bili 1.39. CT A/P shows fullness and hypodense appearance of the inferior pancreatic head . Pt given IV antiemetics, IVF, IV Ativan, IV PPI in ED. Symptoms somewhat improved after receiving droperidol in ED.   Pt admitted as OBS with intractable Vomiting w/ nausea. Abnormal CT . PLan - IV Zofran as needed , add Compazine as second line , diet as willie. IVF . IV PPI . Consider outpt MRI .       Date: 3/8     ED Triage Vitals   Temperature Pulse Respirations Blood Pressure SpO2   03/07/24 0950 03/07/24 0950 03/07/24 0950 03/07/24 0952 03/07/24 0950   98.3 °F (36.8 °C) 75 20 137/63 100 %      Temp Source Heart Rate Source Patient Position - Orthostatic VS BP Location FiO2 (%)    03/07/24 0950 03/07/24 0950 -- 03/07/24 1130 --   Oral Monitor  Right arm       Pain Score       03/07/24 1822       No Pain          Wt Readings from Last 1 Encounters:   12/23/23 70.3 kg (155 lb)     Additional Vital Signs:   Date/Time Temp Pulse Resp BP MAP (mmHg) SpO2   03/08/24 07:19:41 96.9 °F (36.1 °C) Abnormal  54 Abnormal  18 88/46 Abnormal  60 Abnormal  97 %   03/08/24 06:42:01 97 °F (36.1 °C) Abnormal  -- 16 97/45 Abnormal  62 Abnormal  --   03/07/24 22:16:55 98.4 °F (36.9 °C) 65 -- 99/51 67 94 %   03/07/24 1430 -- 72 18 91/56 66 99 %   03/07/24 1335 -- -- 18 97/51 72 97 %   03/07/24 1236 -- -- -- -- -- --   03/07/24 1230 -- 50 Abnormal  18 132/60 87 99 %   03/07/24 1200 -- 51 Abnormal  20 148/67 96 100 %   03/07/24 1130 -- 52 Abnormal  20 164/70 101 100 %   03/07/24 1100 -- 59 -- 151/66 95 100 %     Pertinent Labs/Diagnostic Test Results:     CT abdomen pelvis with contrast   Final Result by Thao Hidalgo MD (03/07 1302)   No acute intra-abdominal pathology.   Mild fullness and hypodense appearance of the inferior pancreatic head. Otherwise unremarkable pancreas. This may be technical in nature, but given recurrent complains of nausea and vomiting, consider correlation with nonemergent, possibly outpatient MRI    with and without contrast. Correlate with pancreatic enzymes.   Two-point centimeter diverticulum at the posterior wall of the gastric fundus, of uncertain clinical significance.   Other chronic findings, as per the body of the report.            Workstation performed: [id number]               Results from last 7 days   Lab Units 03/07/24  0956   WBC Thousand/uL 15.24*   HEMOGLOBIN g/dL 13.9   HEMATOCRIT % 39.8   PLATELETS Thousands/uL 336   NEUTROS ABS Thousands/µL 13.33*         Results from last 7 days   Lab Units 03/07/24  0956   SODIUM mmol/L 139   POTASSIUM mmol/L 4.2   CHLORIDE mmol/L 106   CO2 mmol/L 21   ANION GAP mmol/L 12   BUN mg/dL 8   CREATININE mg/dL 0.74   EGFR ml/min/1.73sq  "m 99   CALCIUM mg/dL 10.4*     Results from last 7 days   Lab Units 03/07/24  0956   AST U/L 19   ALT U/L 15   ALK PHOS U/L 52   TOTAL PROTEIN g/dL 8.1   ALBUMIN g/dL 5.1*   TOTAL BILIRUBIN mg/dL 1.39*         Results from last 7 days   Lab Units 03/07/24  0956   GLUCOSE RANDOM mg/dL 118             No results found for: \"BETA-HYDROXYBUTYRATE\"                                                                       Results from last 7 days   Lab Units 03/07/24  0956   LIPASE u/L 10*                                                                   ED Treatment:   Medication Administration from 03/07/2024 0939 to 03/07/2024 1701         Date/Time Order Dose Route Action     03/07/2024 1004 EST ondansetron (ZOFRAN) 4 mg/2 mL injection **ADS Override Pull** 4 mg  Given     03/07/2024 1127 EST droperidol (INAPSINE) injection 0.625 mg 0.625 mg Intravenous Given     03/07/2024 1125 EST LORazepam (ATIVAN) injection 0.5 mg 0.5 mg Intravenous Given     03/07/2024 1500 EST sodium chloride 0.9 % bolus 1,000 mL 0 mL Intravenous Stopped     03/07/2024 1130 EST sodium chloride 0.9 % bolus 1,000 mL 1,000 mL Intravenous New Bag     03/07/2024 1154 EST iohexol (OMNIPAQUE) 350 MG/ML injection (MULTI-DOSE) 100 mL 80 mL Intravenous Given     03/07/2024 1614 EST sodium chloride 0.9 % infusion 125 mL/hr Intravenous New Bag     03/07/2024 1508 EST pantoprazole (PROTONIX) injection 40 mg 40 mg Intravenous Given          Past Medical History:   Diagnosis Date    Anxiety     Bladder problem     Heart murmur     Hyperlipidemia     Migraine     Ovarian cyst     left     Present on Admission:   Intractable vomiting with nausea   Anxiety      Admitting Diagnosis: Vomiting [R11.10]  PONV (postoperative nausea and vomiting) [R11.2, Z98.890]  Pancreatic lesion [K86.9]  Age/Sex: 43 y.o. female  Admission Orders:  Scheduled Medications:  escitalopram, 20 mg, Oral, Daily  pantoprazole, 40 mg, Intravenous, Q24H MARIA D      Continuous IV Infusions:  sodium " chloride, 125 mL/hr, Intravenous, Continuous      PRN Meds:  ondansetron, 4 mg, Intravenous, Q6H PRN x1 3/8   prochlorperazine, 5 mg, Oral, Q6H PRN      Reg diet    Ambulate TID     Network Utilization Review Department  ATTENTION: Please call with any questions or concerns to [phone] and carefully listen to the prompts so that you are directed to the right person. All voicemails are confidential.   For Discharge needs, contact Care Management DC Support Team at [phone] opt. 2  Send all requests for admission clinical reviews, approved or denied determinations and any other requests to dedicated fax number below belonging to the campus where the patient is receiving treatment. List of dedicated fax numbers for the Facilities:  FACILITY NAME UR FAX NUMBER   ADMISSION DENIALS (Administrative/Medical Necessity) [phone]   DISCHARGE SUPPORT TEAM (NETWORK) [phone]   PARENT CHILD HEALTH (Maternity/NICU/Pediatrics) [phone]   [facility] [phone]   General acute hospital [phone]   [facility] [phone]   [facility] [phone]   [facility] [phone]   [facility] [phone]   [facility] [phone]   [facility] [phone]   [facility] [phone]   [facility] [phone]   [facility] [phone]   [facility] [phone]

## 2024-03-08 NOTE — DISCHARGE SUMMARY
[facility]  Discharge- Suzanna Guerrero 1980, 43 y.o. female MRN: [id number]  Unit/Bed#: S -01 Encounter: [id number]  Primary Care Provider: Alejandra Esparza MD   Date and time admitted to hospital: 3/7/2024 10:49 AM    * Intractable vomiting with nausea  Assessment & Plan  POA with intractable nausea and vomiting after recent colonoscopy.  Has had issues with severe nausea post procedure in the past (cholecystectomy and EGD)  Symptoms somewhat improved after receiving droperidol  Improved after another round of Droperidol and Ativan   Stable for discharge   Stop Reglan  Add Compazine  Encourage hydration     Abnormal CT scan  Assessment & Plan  CT abd: Mild fullness and hypodense appearance of the inferior pancreatic head. Otherwise unremarkable pancreas. This may be technical in nature, but given recurrent complains of nausea and vomiting, consider correlation with nonemergent, possibly outpatient MRI with and without contrast.  Lipase normal  Consider outpatient MRI     Anxiety  Assessment & Plan  Continue home meds         Medical Problems       Resolved Problems  Date Reviewed: 3/8/2024   None       Discharging Physician / Practitioner: Liam Griffith PA-C  PCP: Alejandra Esparza MD  Admission Date:   Admission Orders (From admission, onward)       Ordered        03/07/24 1317  Place in Observation  Once                          Discharge Date: 03/08/24    Consultations During Hospital Stay:  None    Procedures Performed:   CT Abdomen Pelvis    Significant Findings / Test Results:   CT Abdomen Pelvis: No acute intra-abdominal pathology. Mild fullness and hypodense appearance of the inferior pancreatic head. Otherwise unremarkable pancreas. Two-point cm diverticulum at the posterior wall of the gastric fundus, of uncertain clinical significance.     Incidental Findings:   Pancreatic fullness    I reviewed the above mentioned incidental findings with the patient and/or  family and they expressed understanding.    Test Results Pending at Discharge (will require follow up):   None     Outpatient Tests Requested:  MRI    Complications:  None    Reason for Admission: PONV    Hospital Course:   Suzanna Guerrero is a 43 y.o. female patient who originally presented to the hospital on 3/7/2024 due to PONV after colonoscopy. She failed PO trial in ED. CT was without findings. She was admitted and given further IVF and antiemetic. She got another round of Droperidol and Ativan the next day. Overall, patient improved to point where she felt she would manage at home. She will follow up with PCP in 1-2 weeks.         Please see above list of diagnoses and related plan for additional information.     Condition at Discharge: stable    Discharge Day Visit / Exam:   Subjective:  Patient reports feeling better after repeat dose of Droperidol and Ativan. Feels that she can manage at home and continue to improve.   Vitals: Blood Pressure: 105/62 (03/08/24 1318)  Pulse: 66 (03/08/24 1318)  Temperature: (!) 96.9 °F (36.1 °C) (03/08/24 0719)  Temp Source: Oral (03/07/24 0950)  Respirations: 18 (03/08/24 0719)  SpO2: 97 % (03/08/24 1318)  Exam:   Physical Exam  Constitutional:       General: She is sleeping. She is not in acute distress.     Appearance: Normal appearance. She is normal weight. She is not ill-appearing or diaphoretic.   HENT:      Head: Normocephalic and atraumatic.      Mouth/Throat:      Mouth: Mucous membranes are moist.   Eyes:      General: No scleral icterus.     Pupils: Pupils are equal, round, and reactive to light.   Cardiovascular:      Rate and Rhythm: Normal rate and regular rhythm.      Pulses: Normal pulses.      Heart sounds: Normal heart sounds, S1 normal and S2 normal. No murmur heard.     No systolic murmur is present.      No diastolic murmur is present.      No gallop. No S3 or S4 sounds.   Pulmonary:      Effort: Pulmonary effort is normal. No accessory muscle usage or  respiratory distress.      Breath sounds: Normal breath sounds. No stridor. No decreased breath sounds, wheezing, rhonchi or rales.   Chest:      Chest wall: No tenderness.   Abdominal:      General: Bowel sounds are normal. There is no distension.      Palpations: Abdomen is soft.      Tenderness: There is no abdominal tenderness. There is no guarding.   Musculoskeletal:      Right lower leg: No edema.      Left lower leg: No edema.   Skin:     General: Skin is warm and dry.      Coloration: Skin is not jaundiced.   Neurological:      General: No focal deficit present.      Mental Status: She is easily aroused. Mental status is at baseline.      Motor: No tremor or seizure activity.   Psychiatric:         Behavior: Behavior is cooperative.          Discussion with Family: Updated  () via phone.    Discharge instructions/Information to patient and family:   See after visit summary for information provided to patient and family.      Provisions for Follow-Up Care:  See after visit summary for information related to follow-up care and any pertinent home health orders.      Mobility at time of Discharge:   Basic Mobility Inpatient Raw Score: 6  -HLM Goal: 2: Bed activities/Dependent transfer  -HLM Achieved: 7: Walk 25 feet or more  HLM Goal achieved. Continue to encourage appropriate mobility.     Disposition:   Home    Planned Readmission: None     Discharge Statement:  I spent 45 minutes discharging the patient. This time was spent on the day of discharge. I had direct contact with the patient on the day of discharge. Greater than 50% of the total time was spent examining patient, answering all patient questions, arranging and discussing plan of care with patient as well as directly providing post-discharge instructions.  Additional time then spent on discharge activities.    Discharge Medications:  See after visit summary for reconciled discharge medications provided to patient and/or  family.      **Please Note: This note may have been constructed using a voice recognition system**

## 2024-03-08 NOTE — PLAN OF CARE
Problem: PAIN - ADULT  Goal: Verbalizes/displays adequate comfort level or baseline comfort level  Description: Interventions:  - Encourage patient to monitor pain and request assistance  - Assess pain using appropriate pain scale  - Administer analgesics based on type and severity of pain and evaluate response  - Implement non-pharmacological measures as appropriate and evaluate response  - Consider cultural and social influences on pain and pain management  - Notify physician/advanced practitioner if interventions unsuccessful or patient reports new pain  Outcome: Progressing     Problem: INFECTION - ADULT  Goal: Absence or prevention of progression during hospitalization  Description: INTERVENTIONS:  - Assess and monitor for signs and symptoms of infection  - Monitor lab/diagnostic results  - Monitor all insertion sites, i.e. indwelling lines, tubes, and drains  - Monitor endotracheal if appropriate and nasal secretions for changes in amount and color  - [place] appropriate cooling/warming therapies per order  - Administer medications as ordered  - Instruct and encourage patient and family to use good hand hygiene technique  - Identify and instruct in appropriate isolation precautions for identified infection/condition  Outcome: Progressing     Problem: DISCHARGE PLANNING  Goal: Discharge to home or other facility with appropriate resources  Description: INTERVENTIONS:  - Identify barriers to discharge w/patient and caregiver  - Arrange for needed discharge resources and transportation as appropriate  - Identify discharge learning needs (meds, wound care, etc.)  - Arrange for interpretive services to assist at discharge as needed  - Refer to Case Management Department for coordinating discharge planning if the patient needs post-hospital services based on physician/advanced practitioner order or complex needs related to functional status, cognitive ability, or social support system  Outcome: Progressing      Problem: GASTROINTESTINAL - ADULT  Goal: Minimal or absence of nausea and/or vomiting  Description: INTERVENTIONS:  - Administer IV fluids if ordered to ensure adequate hydration  - Maintain NPO status until nausea and vomiting are resolved  - Nasogastric tube if ordered  - Administer ordered antiemetic medications as needed  - Provide nonpharmacologic comfort measures as appropriate  - Advance diet as tolerated, if ordered  - Consider nutrition services referral to assist patient with adequate nutrition and appropriate food choices  Outcome: Progressing  Goal: Maintains or returns to baseline bowel function  Description: INTERVENTIONS:  - Assess bowel function  - Encourage oral fluids to ensure adequate hydration  - Administer IV fluids if ordered to ensure adequate hydration  - Administer ordered medications as needed  - Encourage mobilization and activity  - Consider nutritional services referral to assist patient with adequate nutrition and appropriate food choices  Outcome: Progressing  Goal: Maintains adequate nutritional intake  Description: INTERVENTIONS:  - Monitor percentage of each meal consumed  - Identify factors contributing to decreased intake, treat as appropriate  - Assist with meals as needed  - Monitor I&O, weight, and lab values if indicated  - Obtain nutrition services referral as needed  Outcome: Progressing

## 2024-03-08 NOTE — ASSESSMENT & PLAN NOTE
POA with intractable nausea and vomiting after recent colonoscopy.  Has had issues with severe nausea post procedure in the past (cholecystectomy and EGD)  Symptoms somewhat improved after receiving droperidol  Improved after another round of Droperidol and Ativan   Stable for discharge   Stop Reglan  Add Compazine  Encourage hydration

## 2024-03-08 NOTE — ASSESSMENT & PLAN NOTE
CT abd: Mild fullness and hypodense appearance of the inferior pancreatic head. Otherwise unremarkable pancreas. This may be technical in nature, but given recurrent complains of nausea and vomiting, consider correlation with nonemergent, possibly outpatient MRI with and without contrast.  Lipase normal  Consider outpatient MRI

## 2024-03-08 NOTE — INCIDENTAL FINDINGS
The following findings require follow up:  Radiographic finding   Finding: Pancreatic head fullness   Follow up required: MRI/MRCP   Follow up should be done within 4-6 week(s)    Please notify the following clinician to assist with the follow up:   Dr. Alejandra Esparza MD    Discussed findings with patient and advised follow up. She verbalized understanding of findings and importance of follow up with her PCP.

## 2024-03-11 LAB
ATRIAL RATE: 70 BPM
P AXIS: 55 DEGREES
PR INTERVAL: 150 MS
QRS AXIS: 41 DEGREES
QRSD INTERVAL: 90 MS
QT INTERVAL: 416 MS
QTC INTERVAL: 449 MS
T WAVE AXIS: 34 DEGREES
VENTRICULAR RATE: 70 BPM

## 2024-03-11 PROCEDURE — 93010 ELECTROCARDIOGRAM REPORT: CPT | Performed by: INTERNAL MEDICINE

## 2024-04-21 ENCOUNTER — HOSPITAL ENCOUNTER (EMERGENCY)
Facility: HOSPITAL | Age: 44
Discharge: HOME | End: 2024-04-21
Attending: EMERGENCY MEDICINE
Payer: COMMERCIAL

## 2024-04-21 VITALS
TEMPERATURE: 96.4 F | DIASTOLIC BLOOD PRESSURE: 59 MMHG | RESPIRATION RATE: 16 BRPM | HEART RATE: 72 BPM | SYSTOLIC BLOOD PRESSURE: 106 MMHG | OXYGEN SATURATION: 98 %

## 2024-04-21 DIAGNOSIS — F32.A ANXIETY AND DEPRESSION: Primary | ICD-10-CM

## 2024-04-21 DIAGNOSIS — F41.9 ANXIETY AND DEPRESSION: Primary | ICD-10-CM

## 2024-04-21 LAB
ALBUMIN SERPL BCP-MCNC: 4.5 G/DL (ref 3.5–5)
ALP SERPL-CCNC: 55 U/L (ref 34–104)
ALT SERPL W P-5'-P-CCNC: 11 U/L (ref 7–52)
ANION GAP SERPL CALCULATED.3IONS-SCNC: 13 MMOL/L (ref 4–13)
AST SERPL W P-5'-P-CCNC: 12 U/L (ref 13–39)
BASOPHILS # BLD AUTO: 0.06 THOUSANDS/ÂΜL (ref 0–0.1)
BASOPHILS NFR BLD AUTO: 1 % (ref 0–1)
BILIRUB SERPL-MCNC: 1.19 MG/DL (ref 0.2–1)
BUN SERPL-MCNC: 8 MG/DL (ref 5–25)
CALCIUM SERPL-MCNC: 9.7 MG/DL (ref 8.4–10.2)
CHLORIDE SERPL-SCNC: 104 MMOL/L (ref 96–108)
CO2 SERPL-SCNC: 22 MMOL/L (ref 21–32)
CREAT SERPL-MCNC: 0.69 MG/DL (ref 0.6–1.3)
EOSINOPHIL # BLD AUTO: 0.11 THOUSAND/ÂΜL (ref 0–0.61)
EOSINOPHIL NFR BLD AUTO: 1 % (ref 0–6)
ERYTHROCYTE [DISTWIDTH] IN BLOOD BY AUTOMATED COUNT: 11.9 % (ref 11.6–15.1)
GFR SERPL CREATININE-BSD FRML MDRD: 106 ML/MIN/1.73SQ M
GLUCOSE SERPL-MCNC: 80 MG/DL (ref 65–140)
HCT VFR BLD AUTO: 40 % (ref 34.8–46.1)
HGB BLD-MCNC: 13.5 G/DL (ref 11.5–15.4)
IMM GRANULOCYTES # BLD AUTO: 0.02 THOUSAND/UL (ref 0–0.2)
IMM GRANULOCYTES NFR BLD AUTO: 0 % (ref 0–2)
LIPASE SERPL-CCNC: 12 U/L (ref 11–82)
LYMPHOCYTES # BLD AUTO: 1.29 THOUSANDS/ÂΜL (ref 0.6–4.47)
LYMPHOCYTES NFR BLD AUTO: 17 % (ref 14–44)
MCH RBC QN AUTO: 32.3 PG (ref 26.8–34.3)
MCHC RBC AUTO-ENTMCNC: 33.8 G/DL (ref 31.4–37.4)
MCV RBC AUTO: 96 FL (ref 82–98)
MONOCYTES # BLD AUTO: 0.7 THOUSAND/ÂΜL (ref 0.17–1.22)
MONOCYTES NFR BLD AUTO: 9 % (ref 4–12)
NEUTROPHILS # BLD AUTO: 5.54 THOUSANDS/ÂΜL (ref 1.85–7.62)
NEUTS SEG NFR BLD AUTO: 72 % (ref 43–75)
NRBC BLD AUTO-RTO: 0 /100 WBCS
PLATELET # BLD AUTO: 307 THOUSANDS/UL (ref 149–390)
PMV BLD AUTO: 10.1 FL (ref 8.9–12.7)
POTASSIUM SERPL-SCNC: 3.1 MMOL/L (ref 3.5–5.3)
PROT SERPL-MCNC: 7.1 G/DL (ref 6.4–8.4)
RBC # BLD AUTO: 4.18 MILLION/UL (ref 3.81–5.12)
SODIUM SERPL-SCNC: 139 MMOL/L (ref 135–147)
WBC # BLD AUTO: 7.72 THOUSAND/UL (ref 4.31–10.16)

## 2024-04-21 PROCEDURE — 96361 HYDRATE IV INFUSION ADD-ON: CPT

## 2024-04-21 PROCEDURE — 96365 THER/PROPH/DIAG IV INF INIT: CPT

## 2024-04-21 PROCEDURE — 80053 COMPREHEN METABOLIC PANEL: CPT

## 2024-04-21 PROCEDURE — 99285 EMERGENCY DEPT VISIT HI MDM: CPT | Performed by: EMERGENCY MEDICINE

## 2024-04-21 PROCEDURE — 99284 EMERGENCY DEPT VISIT MOD MDM: CPT

## 2024-04-21 PROCEDURE — 36415 COLL VENOUS BLD VENIPUNCTURE: CPT

## 2024-04-21 PROCEDURE — 83690 ASSAY OF LIPASE: CPT

## 2024-04-21 PROCEDURE — 96366 THER/PROPH/DIAG IV INF ADDON: CPT

## 2024-04-21 PROCEDURE — 96375 TX/PRO/DX INJ NEW DRUG ADDON: CPT

## 2024-04-21 PROCEDURE — 85025 COMPLETE CBC W/AUTO DIFF WBC: CPT

## 2024-04-21 RX ORDER — POTASSIUM CHLORIDE 20 MEQ/1
40 TABLET, EXTENDED RELEASE ORAL ONCE
Status: COMPLETED | OUTPATIENT
Start: 2024-04-21 | End: 2024-04-21

## 2024-04-21 RX ORDER — POTASSIUM CHLORIDE 14.9 MG/ML
20 INJECTION INTRAVENOUS ONCE
Status: COMPLETED | OUTPATIENT
Start: 2024-04-21 | End: 2024-04-21

## 2024-04-21 RX ORDER — ONDANSETRON 2 MG/ML
4 INJECTION INTRAMUSCULAR; INTRAVENOUS ONCE
Status: COMPLETED | OUTPATIENT
Start: 2024-04-21 | End: 2024-04-21

## 2024-04-21 RX ORDER — LORAZEPAM 2 MG/ML
0.5 INJECTION INTRAMUSCULAR ONCE
Status: COMPLETED | OUTPATIENT
Start: 2024-04-21 | End: 2024-04-21

## 2024-04-21 RX ORDER — HYDROXYZINE HYDROCHLORIDE 25 MG/1
25 TABLET, FILM COATED ORAL EVERY 6 HOURS
Qty: 28 TABLET | Refills: 0 | Status: SHIPPED | OUTPATIENT
Start: 2024-04-21 | End: 2024-04-28

## 2024-04-21 RX ADMIN — SODIUM CHLORIDE 1000 ML: 0.9 INJECTION, SOLUTION INTRAVENOUS at 21:26

## 2024-04-21 RX ADMIN — POTASSIUM CHLORIDE 40 MEQ: 1500 TABLET, EXTENDED RELEASE ORAL at 21:24

## 2024-04-21 RX ADMIN — POTASSIUM CHLORIDE 20 MEQ: 14.9 INJECTION, SOLUTION INTRAVENOUS at 21:24

## 2024-04-21 RX ADMIN — ONDANSETRON 4 MG: 2 INJECTION INTRAMUSCULAR; INTRAVENOUS at 20:23

## 2024-04-21 RX ADMIN — SODIUM CHLORIDE 1000 ML: 0.9 INJECTION, SOLUTION INTRAVENOUS at 20:26

## 2024-04-21 RX ADMIN — LORAZEPAM 0.5 MG: 2 INJECTION INTRAMUSCULAR; INTRAVENOUS at 20:23

## 2024-04-21 NOTE — ED PROVIDER NOTES
"History  Chief Complaint   Patient presents with    Anxiety     As per  patient lost job Friday, since then has not been eating or drinking, concerned for dehydration, worsening anxiety, vomiting upon arrival     43-year-old female with PMH of HLD and anxiety who presents to the ED for increasing anxiety and depression.  Patient states she recently lost her job on Monday, 4/15/2024, that she is not been acting like herself.  Patient's  at bedside states that she has not been eating or drinking and just lays around all day.  Patient also has nausea and vomiting with her anxiety.  Patient had her gallbladder removed in November which she states she is also been feeling \"off\" since then.  Patient states her job was very stressful and made her anxiety worse but when she had lost her job her anxiety increased significantly.  Patient also endorses heart palpitations as well as dark urine and not having a normal bowel movement.  Patient states she smokes weed several times a day to help with her anxiety. Denies chest pain, SOB, cough, abdominal pain, diarrhea, fever, chills, dizziness, lightheadedness, HA, dysuria, hematuria, hematochezia, or melena.               Prior to Admission Medications   Prescriptions Last Dose Informant Patient Reported? Taking?   acetaminophen (TYLENOL) 500 mg tablet   No No   Sig: Take 2 tablets (1,000 mg total) by mouth every 6 (six) hours   dicyclomine (BENTYL) 20 mg tablet   No No   Sig: TAKE 1 TABLET (20 MG TOTAL) BY MOUTH EVERY 6 (SIX) HOURS   escitalopram (LEXAPRO) 10 mg tablet   Yes No   Sig: Take 10 mg by mouth daily   ondansetron (ZOFRAN) 8 mg tablet   No No   Sig: Take 1 tablet (8 mg total) by mouth every 8 (eight) hours as needed for nausea or vomiting   pantoprazole (PROTONIX) 20 mg tablet   No No   Sig: Take 1 tablet (20 mg total) by mouth daily   prochlorperazine (COMPAZINE) 5 mg tablet   No No   Sig: Take 1 tablet (5 mg total) by mouth every 6 (six) hours as needed " for nausea or vomiting      Facility-Administered Medications: None       Past Medical History:   Diagnosis Date    Anxiety     Bladder problem     Heart murmur     Hyperlipidemia     Migraine     Ovarian cyst     left       Past Surgical History:   Procedure Laterality Date    ARTHROSCOPY KNEE Left     CHOLECYSTECTOMY  11/01/2023    DILATION AND CURETTAGE OF UTERUS  07/08/2014    HYSTERECTOMY  08/18/2014    Has ovaries    HYSTEROSCOPY  07/08/2014    KNEE SURGERY      LASER ABLATION OF THE CERVIX  07/07/2014    OVARIAN CYST REMOVAL      TUBAL LIGATION  07/07/2014    WISDOM TOOTH EXTRACTION      x4        Family History   Problem Relation Age of Onset    Diabetes Father     Lung cancer Maternal Grandmother      I have reviewed and agree with the history as documented.    E-Cigarette/Vaping    E-Cigarette Use Never User      E-Cigarette/Vaping Substances    Nicotine No     THC No     CBD No     Flavoring No     Other No     Unknown No      Social History     Tobacco Use    Smoking status: Former     Average packs/day: 0.5 packs/day for 15.0 years (7.5 ttl pk-yrs)     Types: Cigarettes     Start date: 12/1/2006    Smokeless tobacco: Former   Vaping Use    Vaping status: Never Used   Substance Use Topics    Alcohol use: Yes     Comment: social    Drug use: Yes     Types: Marijuana        Review of Systems   Constitutional:  Positive for activity change and appetite change. Negative for chills, diaphoresis, fatigue and fever.   HENT:  Negative for congestion, ear pain, postnasal drip, rhinorrhea, sore throat and trouble swallowing.    Eyes:  Negative for pain and visual disturbance.   Respiratory:  Negative for cough and shortness of breath.    Cardiovascular:  Positive for palpitations. Negative for chest pain.   Gastrointestinal:  Positive for nausea and vomiting. Negative for abdominal pain, constipation and diarrhea.   Genitourinary:  Negative for decreased urine volume, dysuria and hematuria.   Musculoskeletal:   Negative for arthralgias and back pain.   Skin:  Negative for color change and rash.   Neurological:  Negative for dizziness, seizures, syncope, weakness, light-headedness and headaches.   Psychiatric/Behavioral:  The patient is nervous/anxious.    All other systems reviewed and are negative.      Physical Exam  ED Triage Vitals   Temperature Pulse Respirations Blood Pressure SpO2   04/21/24 1820 04/21/24 1817 04/21/24 1817 04/21/24 1817 04/21/24 1817   (!) 96.4 °F (35.8 °C) (!) 134 20 144/96 100 %      Temp Source Heart Rate Source Patient Position - Orthostatic VS BP Location FiO2 (%)   04/21/24 1817 04/21/24 1817 04/21/24 1817 04/21/24 1817 --   Oral Monitor Sitting Left arm       Pain Score       04/21/24 1817       No Pain             Orthostatic Vital Signs  Vitals:    04/21/24 1817 04/21/24 1820 04/21/24 2116 04/21/24 2245   BP: 144/96  134/85 106/59   Pulse: (!) 134 103 97 72   Patient Position - Orthostatic VS: Sitting   Sitting       Physical Exam  Vitals and nursing note reviewed.   Constitutional:       Appearance: Normal appearance. She is normal weight. She is ill-appearing.   HENT:      Head: Normocephalic and atraumatic.      Right Ear: External ear normal.      Left Ear: External ear normal.      Nose: Nose normal.      Mouth/Throat:      Mouth: Mucous membranes are dry.      Pharynx: Oropharynx is clear.   Eyes:      Conjunctiva/sclera: Conjunctivae normal.   Cardiovascular:      Rate and Rhythm: Normal rate and regular rhythm.      Pulses: Normal pulses.      Heart sounds: Normal heart sounds.      Comments: RRR with +S1 and S2, no murmurs appreciated on exam. Peripheral pulses intact.    Pulmonary:      Effort: Pulmonary effort is normal. No respiratory distress.      Breath sounds: Normal breath sounds. No wheezing, rhonchi or rales.      Comments: CTABL with no abnormal lung sounds such as wheezes or rales appreciated on exam.     Abdominal:      General: Abdomen is flat. Bowel sounds are  normal. There is no distension.      Palpations: Abdomen is soft.      Tenderness: There is no abdominal tenderness.      Comments: Soft, non tender, normo-active bowel sounds. Without rigidity, guarding, or distension.     Musculoskeletal:         General: Normal range of motion.      Cervical back: Normal range of motion.   Skin:     General: Skin is warm and dry.   Neurological:      General: No focal deficit present.      Mental Status: She is alert and oriented to person, place, and time. Mental status is at baseline.      Comments: CN grossly intact on visualization. No focal neurologic deficits noted on exam.  5/5 strength in all extremities. Neurovascularly intact with normal sensation and motor function.       Psychiatric:         Mood and Affect: Mood normal.         Behavior: Behavior normal.         Thought Content: Thought content normal.         Judgment: Judgment normal.      Comments: No SI/HI at this time. No intent or plan for SI/HI         ED Medications  Medications   sodium chloride 0.9 % bolus 1,000 mL (0 mL Intravenous Stopped 4/21/24 2126)   ondansetron (ZOFRAN) injection 4 mg (4 mg Intravenous Given 4/21/24 2023)   LORazepam (ATIVAN) injection 0.5 mg (0.5 mg Intravenous Given 4/21/24 2023)   potassium chloride (Klor-Con M20) CR tablet 40 mEq (40 mEq Oral Given 4/21/24 2124)   potassium chloride 20 mEq IVPB (premix) (0 mEq Intravenous Stopped 4/21/24 2319)   sodium chloride 0.9 % bolus 1,000 mL (0 mL Intravenous Stopped 4/21/24 2226)       Diagnostic Studies  Results Reviewed       Procedure Component Value Units Date/Time    Comprehensive metabolic panel [[id number]]  (Abnormal) Collected: 04/21/24 2023    Lab Status: Final result Specimen: Blood from Arm, Left Updated: 04/21/24 2104     Sodium 139 mmol/L      Potassium 3.1 mmol/L      Chloride 104 mmol/L      CO2 22 mmol/L      ANION GAP 13 mmol/L      BUN 8 mg/dL      Creatinine 0.69 mg/dL      Glucose 80 mg/dL      Calcium 9.7 mg/dL       AST 12 U/L      ALT 11 U/L      Alkaline Phosphatase 55 U/L      Total Protein 7.1 g/dL      Albumin 4.5 g/dL      Total Bilirubin 1.19 mg/dL      eGFR 106 ml/min/1.73sq m     Narrative:      [employer] guidelines for Chronic Kidney Disease (CKD):     Stage 1 with normal or high GFR (GFR > 90 mL/min/1.73 square meters)    Stage 2 Mild CKD (GFR = 60-89 mL/min/1.73 square meters)    Stage 3A Moderate CKD (GFR = 45-59 mL/min/1.73 square meters)    Stage 3B Moderate CKD (GFR = 30-44 mL/min/1.73 square meters)    Stage 4 Severe CKD (GFR = 15-29 mL/min/1.73 square meters)    Stage 5 End Stage CKD (GFR <15 mL/min/1.73 square meters)  Note: GFR calculation is accurate only with a steady state creatinine    Lipase [[id number]]  (Normal) Collected: 04/21/24 2023    Lab Status: Final result Specimen: Blood from Arm, Left Updated: 04/21/24 2104     Lipase 12 u/L     CBC and differential [[id number]] Collected: 04/21/24 2023    Lab Status: Final result Specimen: Blood from Arm, Left Updated: 04/21/24 2036     WBC 7.72 Thousand/uL      RBC 4.18 Million/uL      Hemoglobin 13.5 g/dL      Hematocrit 40.0 %      MCV 96 fL      MCH 32.3 pg      MCHC 33.8 g/dL      RDW 11.9 %      MPV 10.1 fL      Platelets 307 Thousands/uL      nRBC 0 /100 WBCs      Segmented % 72 %      Immature Grans % 0 %      Lymphocytes % 17 %      Monocytes % 9 %      Eosinophils Relative 1 %      Basophils Relative 1 %      Absolute Neutrophils 5.54 Thousands/µL      Absolute Immature Grans 0.02 Thousand/uL      Absolute Lymphocytes 1.29 Thousands/µL      Absolute Monocytes 0.70 Thousand/µL      Eosinophils Absolute 0.11 Thousand/µL      Basophils Absolute 0.06 Thousands/µL                    No orders to display         Procedures  Procedures      ED Course  ED Course as of 04/22/24 1839   Sun Apr 21, 2024 2107 LIPASE: 12 2108 Potassium(!): 3.1  Will replete potassium                                       Medical Decision  Making  43-year-old female with PMH of H LD and anxiety who presented to the ED for worsening anxiety and depression after losing her job.  Patient's labs were obtained and reviewed by the ED provider.  See ED course for more details about patient's ED stay.  Patient's labs showed hypokalemia with a potassium level of 3.1 however no other acute concerns at this time.  While in the emergency department, patient was given 2 L of normal saline, 0.5 mg of Ativan, 4 mg of Zofran, 40 mEq of oral potassium chloride, and 20 mEq of IV potassium chloride.  Upon reevaluation at bedside, patient noted to have improvement in her symptoms.  Through shared decision-making between the patient and the provider, the patient was planned for discharge.  Patient was advised to follow-up outpatient with her PCP and was also provided other outpatient resources.  Patient was also given a prescription for Atarax as needed for anxiety.  Patient was instructed return to the ED if her symptoms worsen including but not limited to lightheadedness or dizziness, continued lack of appetite, thoughts of SI or HI, chest pain, shortness of breath, abdominal pain, or changes in her behavior.    Amount and/or Complexity of Data Reviewed  Labs: ordered. Decision-making details documented in ED Course.    Risk  Prescription drug management.          Disposition  Final diagnoses:   Anxiety and depression     Time reflects when diagnosis was documented in both MDM as applicable and the Disposition within this note       Time User Action Codes Description Comment    4/21/2024 10:54 PM Kranthi Jeong Add [F41.9] Anxiety     4/21/2024 10:54 PM Kranthi Jeong Remove [F41.9] Anxiety     4/21/2024 10:54 PM Kranthi Jeong Add [F41.9,  F32.A] Anxiety and depression           ED Disposition       ED Disposition   Discharge    Condition   Stable    Date/Time   Sun Apr 21, 2024 10:54 PM    Comment   Suzanna Guerrero discharge to home/self care.                   Follow-up  Information       Follow up With Specialties Details Why Contact Info    Alejandra Esparza MD Internal Medicine Call  As needed [address] [facility]  [address]  [place] [address]  [phone]              Discharge Medication List as of 4/21/2024 10:54 PM        CONTINUE these medications which have NOT CHANGED    Details   acetaminophen (TYLENOL) 500 mg tablet Take 2 tablets (1,000 mg total) by mouth every 6 (six) hours, Starting Wed 9/19/2018, No Print      dicyclomine (BENTYL) 20 mg tablet TAKE 1 TABLET (20 MG TOTAL) BY MOUTH EVERY 6 (SIX) HOURS, Starting Mon 1/17/2022, Normal      escitalopram (LEXAPRO) 10 mg tablet Take 10 mg by mouth daily, Historical Med      ondansetron (ZOFRAN) 8 mg tablet Take 1 tablet (8 mg total) by mouth every 8 (eight) hours as needed for nausea or vomiting, Starting Sun 12/24/2023, Normal      pantoprazole (PROTONIX) 20 mg tablet Take 1 tablet (20 mg total) by mouth daily, Starting Wed 6/3/2020, Normal      prochlorperazine (COMPAZINE) 5 mg tablet Take 1 tablet (5 mg total) by mouth every 6 (six) hours as needed for nausea or vomiting, Starting Fri 3/8/2024, Normal           No discharge procedures on file.    PDMP Review         Value Time User    PDMP Reviewed  Yes 12/21/2023 11:54 PM Grover Kapoor MD             ED Provider  Attending physically available and evaluated Suzanna Guerrero. I managed the patient along with the ED Attending.    Electronically Signed by           Kranthi Jeong MD  04/22/24 4350

## 2024-05-01 NOTE — ED ATTENDING ATTESTATION
4/21/2024  I, Alok Ambrose DO, saw and evaluated the patient. I have discussed the patient with the resident/non-physician practitioner and agree with the resident's/non-physician practitioner's findings, Plan of Care, and MDM as documented in the resident's/non-physician practitioner's note, except where noted. All available labs and Radiology studies were reviewed.  I was present for key portions of any procedure(s) performed by the resident/non-physician practitioner and I was immediately available to provide assistance.       At this point I agree with the current assessment done in the Emergency Department.  I have conducted an independent evaluation of this patient a history and physical is as follows:    ED Course         Critical Care Time  Procedures